# Patient Record
Sex: FEMALE | Race: WHITE | NOT HISPANIC OR LATINO | Employment: UNEMPLOYED | URBAN - METROPOLITAN AREA
[De-identification: names, ages, dates, MRNs, and addresses within clinical notes are randomized per-mention and may not be internally consistent; named-entity substitution may affect disease eponyms.]

---

## 2022-05-23 LAB
CHOLEST SERPL-MSCNC: 183 MG/DL (ref 0–200)
HDLC SERPL-MCNC: 77 MG/DL
LDL CHOLESTEROL DIRECT: 82 MG/DL
TRIGL SERPL-MCNC: 120 MG/DL

## 2023-02-01 ENCOUNTER — OFFICE VISIT (OUTPATIENT)
Dept: OBSTETRICS AND GYNECOLOGY | Facility: CLINIC | Age: 38
End: 2023-02-01
Payer: COMMERCIAL

## 2023-02-01 VITALS
WEIGHT: 151.88 LBS | SYSTOLIC BLOOD PRESSURE: 102 MMHG | DIASTOLIC BLOOD PRESSURE: 64 MMHG | HEIGHT: 67 IN | BODY MASS INDEX: 23.84 KG/M2

## 2023-02-01 DIAGNOSIS — N92.6 MISSED PERIOD: ICD-10-CM

## 2023-02-01 DIAGNOSIS — Z71.3 WEIGHT LOSS COUNSELING, ENCOUNTER FOR: ICD-10-CM

## 2023-02-01 DIAGNOSIS — Z00.00 ANNUAL PHYSICAL EXAM: ICD-10-CM

## 2023-02-01 DIAGNOSIS — Z11.3 SCREENING FOR STDS (SEXUALLY TRANSMITTED DISEASES): Primary | ICD-10-CM

## 2023-02-01 LAB
B-HCG UR QL: NEGATIVE
CTP QC/QA: YES

## 2023-02-01 PROCEDURE — 81025 POCT URINE PREGNANCY: ICD-10-PCS | Mod: S$GLB,,, | Performed by: OBSTETRICS & GYNECOLOGY

## 2023-02-01 PROCEDURE — 87591 N.GONORRHOEAE DNA AMP PROB: CPT

## 2023-02-01 PROCEDURE — 81025 URINE PREGNANCY TEST: CPT | Mod: S$GLB,,, | Performed by: OBSTETRICS & GYNECOLOGY

## 2023-02-01 PROCEDURE — 99999 PR PBB SHADOW E&M-NEW PATIENT-LVL III: ICD-10-PCS | Mod: PBBFAC,,,

## 2023-02-01 PROCEDURE — 99999 PR PBB SHADOW E&M-NEW PATIENT-LVL III: CPT | Mod: PBBFAC,,,

## 2023-02-01 PROCEDURE — 99202 PR OFFICE/OUTPT VISIT, NEW, LEVL II, 15-29 MIN: ICD-10-PCS | Mod: S$GLB,,, | Performed by: OBSTETRICS & GYNECOLOGY

## 2023-02-01 PROCEDURE — 99202 OFFICE O/P NEW SF 15 MIN: CPT | Mod: S$GLB,,, | Performed by: OBSTETRICS & GYNECOLOGY

## 2023-02-01 PROCEDURE — 87491 CHLMYD TRACH DNA AMP PROBE: CPT

## 2023-02-01 RX ORDER — FLUOXETINE HYDROCHLORIDE 20 MG/1
20 CAPSULE ORAL DAILY
COMMUNITY
End: 2023-02-09 | Stop reason: SDUPTHER

## 2023-02-01 RX ORDER — HYDROXYZINE HYDROCHLORIDE 10 MG/1
25 TABLET, FILM COATED ORAL 3 TIMES DAILY PRN
COMMUNITY
End: 2023-02-09

## 2023-02-01 NOTE — PROGRESS NOTES
"CC: STD check    HPI:  Frankel Elizabeth is a 37 y.o. female  presents to walk in clinic for STD check. Pt is sexually active with a new partner. Denies contraceptive use and condom use. States that she is 2 days late for her menstrual cycle. UPT in clinic was negative. Wants STD urine check for G/C. Declines other testing.    Past Medical History:   Diagnosis Date    Asthma      Past Surgical History:   Procedure Laterality Date    WRIST SURGERY      both wrist     Social History     Tobacco Use    Smoking status: Never    Smokeless tobacco: Never   Substance Use Topics    Alcohol use: Yes     Comment: socially    Drug use: Never     History reviewed. No pertinent family history.  OB History    Para Term  AB Living   1       1     SAB IAB Ectopic Multiple Live Births                  # Outcome Date GA Lbr Hilario/2nd Weight Sex Delivery Anes PTL Lv   1 AB                /64   Ht 5' 7" (1.702 m)   Wt 68.9 kg (151 lb 14.4 oz)   LMP 2023   BMI 23.79 kg/m²     ROS:  GENERAL: No fever, chills, fatigability or weight loss.  VULVAR: No pain, no lesions and no itching.  VAGINAL: No relaxation, no itching, no discharge, no abnormal bleeding and no lesions.  ABDOMEN: No abdominal pain. Denies nausea. Denies vomiting. No diarrhea. No constipation  BREAST: Denies pain. No lumps. No discharge.  URINARY: No incontinence, no nocturia, no frequency and no dysuria.  CARDIOVASCULAR: No chest pain. No shortness of breath. No leg cramps.  NEUROLOGICAL: No headaches. No vision changes.    PE:  AFFECT: Calm, alert and oriented X 3. Interactive during exam  GENERAL: Appears well-nourished, well-developed, in no acute distress.  HEAD: Normocephalic, atruamatic  SKIN: Normal for race, warm, & dry. No lesions or rashes.  LUNGS: Easy and unlabored  HEART: Regular rate   EXTREMITIES: No cyanosis, clubbing or edema. No calf tenderness.    ASSESSMENT and PLAN:    ICD-10-CM ICD-9-CM    1. Screening for STDs " (sexually transmitted diseases)  Z11.3 V74.5 C. trachomatis/N. gonorrhoeae by AMP DNA [facility]; Urine      2. Missed period  N92.6 626.4 POCT Urine Pregnancy        - G/C urine ordered.  - Discussed importance of condom use.  - Referral sent for PCP and appointment made with GYN 2/6/2023 for wwe.    FOLLOW UP: PRN lack of improvement.    Maryann MCPHERSON-S     I have seen the patient, reviewed the  PA students  assessment, plan, and progress note. I have personally interviewed and examined the patient at bedside and: agree with the findings.      Kimmie Tamayo, NP-C  OBGYN

## 2023-02-02 LAB
C TRACH DNA SPEC QL NAA+PROBE: NOT DETECTED
N GONORRHOEA DNA SPEC QL NAA+PROBE: NOT DETECTED

## 2023-02-06 ENCOUNTER — TELEPHONE (OUTPATIENT)
Dept: OBSTETRICS AND GYNECOLOGY | Facility: CLINIC | Age: 38
End: 2023-02-06
Payer: COMMERCIAL

## 2023-02-06 NOTE — TELEPHONE ENCOUNTER
Returned pts call. Pt stated that she stated her cycle and had questions/ concerns about still coming in today. All questions answered and pt wanted to r/s appt. Vu and appt r/s     ----- Message from Nicole Vazquez sent at 2/6/2023 11:19 AM CST -----  Regarding: missed call  Type:  Patient Returning Call    Who Called:Juany    Who Left Message for Patient:Negrita     Does the patient know what this is regarding?:yes     Would the patient rather a call back or a response via [facility]? Call     Best Call Back Number:[phone]  Additional Information:

## 2023-02-06 NOTE — TELEPHONE ENCOUNTER
Returned pts call. Pt did not answer, left vm for pt to give the office a call back     ----- Message from Kartik Alberto sent at 2/6/2023 10:58 AM CST -----  Regarding: Call BAck  Name of Who is Calling:ELIZABETH, FRANKEL [[id number]]              What is the request in detail: Patient has a appointment today 02- states her period is down but it's light should she still come in. Please assist              Can the clinic reply by [facility]: No              What Number to Call Back if not in [facility]: [phone]

## 2023-02-09 ENCOUNTER — OFFICE VISIT (OUTPATIENT)
Dept: PRIMARY CARE CLINIC | Facility: CLINIC | Age: 38
End: 2023-02-09
Attending: FAMILY MEDICINE
Payer: COMMERCIAL

## 2023-02-09 VITALS
BODY MASS INDEX: 23.63 KG/M2 | WEIGHT: 150.56 LBS | OXYGEN SATURATION: 99 % | HEART RATE: 74 BPM | HEIGHT: 67 IN | SYSTOLIC BLOOD PRESSURE: 102 MMHG | DIASTOLIC BLOOD PRESSURE: 68 MMHG

## 2023-02-09 DIAGNOSIS — K21.9 GASTROESOPHAGEAL REFLUX DISEASE WITHOUT ESOPHAGITIS: ICD-10-CM

## 2023-02-09 DIAGNOSIS — F41.1 GENERALIZED ANXIETY DISORDER: Primary | ICD-10-CM

## 2023-02-09 DIAGNOSIS — K44.9 HIATAL HERNIA: ICD-10-CM

## 2023-02-09 DIAGNOSIS — E53.8 B12 DEFICIENCY: ICD-10-CM

## 2023-02-09 DIAGNOSIS — Z71.3 WEIGHT LOSS COUNSELING, ENCOUNTER FOR: ICD-10-CM

## 2023-02-09 DIAGNOSIS — Z00.01 ENCOUNTER FOR GENERAL ADULT MEDICAL EXAMINATION WITH ABNORMAL FINDINGS: ICD-10-CM

## 2023-02-09 PROCEDURE — 99999 PR PBB SHADOW E&M-EST. PATIENT-LVL III: CPT | Mod: PBBFAC,,, | Performed by: FAMILY MEDICINE

## 2023-02-09 PROCEDURE — 99999 PR PBB SHADOW E&M-EST. PATIENT-LVL III: ICD-10-PCS | Mod: PBBFAC,,, | Performed by: FAMILY MEDICINE

## 2023-02-09 PROCEDURE — 99385 PREV VISIT NEW AGE 18-39: CPT | Mod: S$GLB,,, | Performed by: FAMILY MEDICINE

## 2023-02-09 PROCEDURE — 99385 PR PREVENTIVE VISIT,NEW,18-39: ICD-10-PCS | Mod: S$GLB,,, | Performed by: FAMILY MEDICINE

## 2023-02-09 RX ORDER — OMEPRAZOLE 20 MG/1
20 CAPSULE, DELAYED RELEASE ORAL DAILY
Qty: 90 CAPSULE | Refills: 3 | Status: SHIPPED | OUTPATIENT
Start: 2023-02-09 | End: 2023-07-31 | Stop reason: ALTCHOICE

## 2023-02-09 RX ORDER — HYDROXYZINE HYDROCHLORIDE 25 MG/1
25 TABLET, FILM COATED ORAL 3 TIMES DAILY PRN
Qty: 30 TABLET | Refills: 0 | Status: SHIPPED | OUTPATIENT
Start: 2023-02-09 | End: 2023-05-23 | Stop reason: SDUPTHER

## 2023-02-09 RX ORDER — PROPRANOLOL HYDROCHLORIDE 10 MG/1
10 TABLET ORAL 3 TIMES DAILY PRN
Qty: 30 TABLET | Refills: 5 | Status: SHIPPED | OUTPATIENT
Start: 2023-02-09

## 2023-02-09 RX ORDER — FLUOXETINE HYDROCHLORIDE 20 MG/1
20 CAPSULE ORAL DAILY
Qty: 90 CAPSULE | Refills: 3 | Status: SHIPPED | OUTPATIENT
Start: 2023-02-09 | End: 2023-11-29

## 2023-02-09 RX ORDER — OMEPRAZOLE 20 MG/1
20 CAPSULE, DELAYED RELEASE ORAL DAILY
COMMUNITY
End: 2023-02-09 | Stop reason: SDUPTHER

## 2023-02-09 NOTE — PROGRESS NOTES
FAMILY MEDICINE  [facility]    Reason for visit:   Chief Complaint   Patient presents with    Annual Exam         SUBJECTIVE: Elizabeth Frankel is a 37 y.o. female  - with anxiety, B12 deficiency, GERD and hiatal hernia presents as a new patient to establish care and refill medication. Last PCP [place]    Gynecology Kimmie Tamayo NP  - scheduled for PAP 2/13/23    She reports that she recently had a full panel of labs done by her PCP in [place] prior to moving to [facility]. Release signed at visit.     She does have a history of B12 deficiency.  She reports that she did have an endoscopy.  They did try oral B12 without any improvement levels.  She reports her endoscopy was negative.  She is on a PPI as well as B12 injections twice a month.     She is also interested in discussing weight loss.  She is gained about 18 lb to [place].  She is very active and also eats a very clean and healthy diet.  She reports it increases her anxiety.  Her BMI is 23.58.      1. Anxiety    Age diagnosed: teenager     Today 2/9/23:  She reports her anxiety has been fairly well controlled.  She does have a lot of increased stressors.  She reports her sister is ill.  She reports that she is been on anti anxiety medicine since her teenage years.  She has tried several medications in the past and has felt most stable on fluoxetine.  Two years ago her mother passed suddenly from anal cancer secondary to HPV.  It was during the-pandemic and she and her sister had to care for her mother.  She reports it was very traumatizing.  She used to be on benzodiazepines as needed for breakthrough anxiety can panic attacks.  She has been able to wean off of her benzodiazepine and currently takes hydroxyzine 25 mg as needed.  She reports that hydroxyzine often makes her feel groggy.  She has at times tried propanolol and feels that she tolerates that much better and does not give her an over sedating effect.  She is not tried  buspirone in the past.  She has had counseling in the past but does not have counselor currently    Prior notes: NA    Panic attacks: yes  Hopelessness:  denies  Sleep issues:  denies  Suicidal thoughts:  denies  Thoughts of self harm: denies  Thoughts of harm to others:  denies  History of suicide attempts:  denies  Family history of suicide: denies    Psychiatrist: none  Psychologist: none  Counselor : previous but none currently    Prior medication: sertraline, wellbutrin    Current medications:   FLUoxetine 20 MG capsule, Take 20 mg by mouth once daily., Disp: , Rfl:   hydrOXYzine HCL (ATARAX) 10 MG Tab, Take 25 mg by mouth 3 (three) times daily as needed., Disp: , Rfl:     Side effects of current treatment: weight    Support system: family and friends    2. GERD without esophagitis    Age diagnosed: 30's    Current treatment:   omeprazole (PRILOSEC) 20 MG capsule, Take 20 mg by mouth once daily., Disp: , Rfl:     Symptoms: reflux  Dietary management: High compliance  Are symptoms controlled? Yes  Prior EGD: Yes reports done for B12 deficiency and GERD + hiatal hernia and otherwise negative for Hpylori and malignancy.               Review of Systems   Constitutional:         +weight gain 18 lbs   All other systems reviewed and are negative.    HEALTH MAINTENANCE:   Health Maintenance   Topic Date Due    Hepatitis C Screening  Never done    Lipid Panel  Never done    TETANUS VACCINE  02/09/2031     Health Maintenance Topics with due status: Not Due       Topic Last Completion Date    TETANUS VACCINE 02/09/2021     Health Maintenance Due   Topic Date Due    Hepatitis C Screening  Never done    Cervical Cancer Screening  Never done    Lipid Panel  Never done    COVID-19 Vaccine (1) Never done    HIV Screening  Never done    Influenza Vaccine (1) Never done       HISTORY:   Past Medical History:   Diagnosis Date    Anxiety     Asthma     B12 deficiency anemia     Hiatal hernia        Past Surgical History:    Procedure Laterality Date    WRIST SURGERY Bilateral     ligament repair       Family History   Problem Relation Age of Onset    Cancer Mother         HPV anal cancer    Depression Mother     Macular degeneration Mother     No Known Problems Father     No Known Problems Sister     No Known Problems Sister     Lung cancer Maternal Grandmother     Breast cancer Maternal Grandmother     No Known Problems Maternal Grandfather     No Known Problems Paternal Grandmother     No Known Problems Paternal Grandfather        Social History     Tobacco Use    Smoking status: Never     Passive exposure: Never    Smokeless tobacco: Never   Substance Use Topics    Alcohol use: Yes     Comment: socially    Drug use: Never       Social History     Social History Narrative    Single. No children (). From [place] but she has moved around lot and most recently moved from [place], [place] to [facility] 10/2022. She lives alone. She works in sales and enjoys it. Her mother passed  from aggressive anal cancer during the peak of Covid-19 pandemic and it was very traumatic. She has 2 half sisters, 2 step sisters and 2 step brothers. 1 sister       ALLERGIES:   Review of patient's allergies indicates:  No Known Allergies    MEDS:     Current Outpatient Medications:     cyanocobalamin, vitamin B-12, 1,000 mcg/mL Kit, Inject 1,000 mcg as directed every 14 (fourteen) days., Disp: 6 kit, Rfl: 2    FLUoxetine 20 MG capsule, Take 1 capsule (20 mg total) by mouth once daily., Disp: 90 capsule, Rfl: 3    hydrOXYzine HCL (ATARAX) 25 MG tablet, Take 1 tablet (25 mg total) by mouth 3 (three) times daily as needed for Anxiety., Disp: 30 tablet, Rfl: 0    omeprazole (PRILOSEC) 20 MG capsule, Take 1 capsule (20 mg total) by mouth once daily., Disp: 90 capsule, Rfl: 3    propranoloL (INDERAL) 10 MG tablet, Take 1 tablet (10 mg total) by mouth 3 (three) times daily as needed (anxiety)., Disp: 30 tablet, Rfl: 5      Vital signs:   Vitals:    23  "1207   BP: 102/68   Pulse: 74   SpO2: 99%   Weight: 68.3 kg (150 lb 9.2 oz)   Height: 5' 7" (1.702 m)     Body mass index is 23.58 kg/m².    PHYSICAL EXAM:     Physical Exam  Vitals reviewed.   Constitutional:       General: She is not in acute distress.  HENT:      Head: Normocephalic and atraumatic.      Right Ear: Tympanic membrane and ear canal normal.      Left Ear: Tympanic membrane and ear canal normal.   Eyes:      General: No scleral icterus.     Conjunctiva/sclera: Conjunctivae normal.   Neck:      Thyroid: No thyromegaly.      Vascular: No carotid bruit.   Cardiovascular:      Rate and Rhythm: Normal rate and regular rhythm.      Pulses: Normal pulses.      Heart sounds: Normal heart sounds. No murmur heard.    No friction rub. No gallop.   Pulmonary:      Effort: Pulmonary effort is normal.      Breath sounds: Normal breath sounds. No wheezing, rhonchi or rales.   Abdominal:      General: Bowel sounds are normal. There is no distension.      Palpations: Abdomen is soft.      Tenderness: There is no abdominal tenderness.   Musculoskeletal:      Cervical back: Normal range of motion and neck supple.      Right lower leg: No edema.      Left lower leg: No edema.   Lymphadenopathy:      Cervical: No cervical adenopathy.   Skin:     General: Skin is warm.      Capillary Refill: Capillary refill takes less than 2 seconds.   Neurological:      Mental Status: She is alert.           PERTINENT RESULTS:   No visits with results within 1 Week(s) from this visit.   Latest known visit with results is:   Office Visit on 02/01/2023   Component Date Value Ref Range Status    Chlamydia, Amplified DNA 02/01/2023 Not Detected  Not Detected Final    N gonorrhoeae, amplified DNA 02/01/2023 Not Detected  Not Detected Final    POC Preg Test, Ur 02/01/2023 Negative  Negative Final     Acceptable 02/01/2023 Yes   Final     Recent labs done by last PCP. Request a copy    ASSESSMENT/PLAN:    1. Encounter for general " adult medical examination with abnormal findings  Overview:  - preventative health counseling  - counseling on current recommendations for breast cancer screening. Grandmother with breast cancer  - counseling on current recommendations for cervical cancer screening. Scheduled 2/13/23 with Gynecology  - counseling on current recommendations for colon cancer screening. Average risk  - Vaccines recommended at this visit: see below        2. Generalized anxiety disorder  Overview:  - stable  - continue current medications  - trial propranolol 10 mg PRN    Orders:  -     propranoloL (INDERAL) 10 MG tablet; Take 1 tablet (10 mg total) by mouth 3 (three) times daily as needed (anxiety).  Dispense: 30 tablet; Refill: 5  -     FLUoxetine 20 MG capsule; Take 1 capsule (20 mg total) by mouth once daily.  Dispense: 90 capsule; Refill: 3  -     hydrOXYzine HCL (ATARAX) 25 MG tablet; Take 1 tablet (25 mg total) by mouth 3 (three) times daily as needed for Anxiety.  Dispense: 30 tablet; Refill: 0    3. Gastroesophageal reflux disease without esophagitis  Overview:  - history of EGD  - well controlled  - continue current management plan   - patient encouraged to notify me with any changes    Orders:  -     omeprazole (PRILOSEC) 20 MG capsule; Take 1 capsule (20 mg total) by mouth once daily.  Dispense: 90 capsule; Refill: 3    4. Hiatal hernia  Overview:  - noted on previous EGD      5. B12 deficiency  Overview:  - evaluated for atrophic gastritis with EGD and negative  - no improvement with oral B12  - currently on SC B12 1000 mcg twice a month    Orders:  -     cyanocobalamin, vitamin B-12, 1,000 mcg/mL Kit; Inject 1,000 mcg as directed every 14 (fourteen) days.  Dispense: 6 kit; Refill: 2    6. Weight loss counseling, encounter for  Overview:  - discussed that currently medications not recommended for her BMI of 23.5  - she is active and exercising well.  She has a healthy diet.    Orders:  -     Ambulatory referral/consult to  Internal Medicine          ORDERS:   Orders Placed This Encounter    propranoloL (INDERAL) 10 MG tablet    FLUoxetine 20 MG capsule    omeprazole (PRILOSEC) 20 MG capsule    hydrOXYzine HCL (ATARAX) 25 MG tablet    cyanocobalamin, vitamin B-12, 1,000 mcg/mL Kit       Vaccines recommended: she reports up to date with flu, covid-19 and Tdap    Follow-up in 1 year pending results or sooner if any concerns.      This note is dictated using the M*Modal Fluency Direct word recognition program. There are word recognition mistakes that are occasionally missed on review.    Dr. Lucila Krause D.O.   Family Medicine

## 2023-02-14 ENCOUNTER — PATIENT OUTREACH (OUTPATIENT)
Dept: ADMINISTRATIVE | Facility: HOSPITAL | Age: 38
End: 2023-02-14
Payer: COMMERCIAL

## 2023-03-29 ENCOUNTER — TELEPHONE (OUTPATIENT)
Dept: PRIMARY CARE CLINIC | Facility: CLINIC | Age: 38
End: 2023-03-29
Payer: COMMERCIAL

## 2023-03-29 ENCOUNTER — E-VISIT (OUTPATIENT)
Dept: PRIMARY CARE CLINIC | Facility: CLINIC | Age: 38
End: 2023-03-29
Attending: FAMILY MEDICINE
Payer: COMMERCIAL

## 2023-03-29 DIAGNOSIS — R39.11 URINARY HESITANCY: Primary | ICD-10-CM

## 2023-03-29 DIAGNOSIS — Z20.2 STD EXPOSURE: Primary | ICD-10-CM

## 2023-03-29 DIAGNOSIS — N30.01 ACUTE CYSTITIS WITH HEMATURIA: ICD-10-CM

## 2023-03-29 DIAGNOSIS — Z11.3 SCREEN FOR STD (SEXUALLY TRANSMITTED DISEASE): ICD-10-CM

## 2023-03-29 PROCEDURE — 99421 PR E&M, ONLINE DIGIT, EST, < 7 DAYS, 5-10 MINS: ICD-10-PCS | Mod: 95,,, | Performed by: FAMILY MEDICINE

## 2023-03-29 PROCEDURE — 99421 OL DIG E/M SVC 5-10 MIN: CPT | Mod: 95,,, | Performed by: FAMILY MEDICINE

## 2023-03-29 RX ORDER — CIPROFLOXACIN 500 MG/1
500 TABLET ORAL EVERY 12 HOURS
Qty: 10 TABLET | Refills: 0 | Status: SHIPPED | OUTPATIENT
Start: 2023-03-29 | End: 2023-04-03

## 2023-03-29 NOTE — PROGRESS NOTES
Patient ID: Elizabeth Frankel is a 37 y.o. female.    Chief Complaint: Urinary Tract Infection    The patient initiated a request through [employer] on 3/29/2023 for evaluation and management with a chief complaint of Urinary Tract Infection     I evaluated the questionnaire submission on 3/29/23. Also called and spoke with patient.  She denies urinary hesitancy reports it is more of an urgency.  She also denies severe pelvic pain.  She reports she is discomfort in her suprapubic area.  She denies any fevers or chills.  She denies any flank pain.  She also has concerns for STD and would like testing for gonorrhea, chlamydia and Trichomonas.  She denies any vaginal discharge    [facility] Peq Evisit Uti Questionnaire    3/29/2023  3:14 PM CDT - Filed by Patient   Do you agree to participate in an E-Visit? Yes   If you have any of the following problems, please present to your local ER or call 911:  I acknowledge   What is the main issue that you would like for your doctor to address today? Abdominal pain  and difficulty urinating   Are you able to take your vital signs? No   Are you currently pregnant, could you be pregnant, or are you breast feeding? None of the above   What symptoms do you currently have? Difficulty passing urine   When did your symptoms first appear? 3/26/2023   List what you have done or taken to help your symptoms. NA   Please indicate whether you have had the following symptoms during the past 24 hours     Urgent urination (a sudden and uncontrollable urge to urinate) Mild   Frequent urination of small amounts of urine (going to the toilet very often) Moderate   Burning pain when urinating None   Incomplete bladder emptying (still feel like you need to urinate again after urination) Mild   Pain not associated with urination in the lower abdomen below the belly button) Severe   What does your urine look like? I am not sure   Blood seen in the urine None   Flank pain (pain in one or both sides of the lower  back) Mild   Abnormal Vaginal Discharge (abnormal amount, color and/or odor) Mild   Discharge from the urethra (urinary opening) without urination None   High body temperature/fever? None-<99.5   Please rate how much discomfort you have experience because of the symptoms in the past 24 hours: Moderate   Please indicate how the symptoms have interfered with your every day activities/work in the past 24 hours: Mild   Please indicate how these symptoms have interfered with your social activities (visiting people, meeting with friends, etc.) in the past 24 hours? Mild   Are you a diabetic? No   Please indicate whether you have the following at the time of completion of this questionnaire: None of the above   Provide any information you feel is important to your history not asked above    Please attach any relevant images or files (if you have performed a home test for UTI, please submit a photo of results)           Patient Active Problem List   Diagnosis    B12 deficiency    Encounter for general adult medical examination with abnormal findings    Generalized anxiety disorder    Gastroesophageal reflux disease without esophagitis    Hiatal hernia    Weight loss counseling, encounter for       Vital signs: see above     Recent Labs Obtained:  Pending collection    1. Urinary hesitancy  -     Urinalysis, Reflex to Urine Culture Urine, Clean Catch; Future; Expected date: 03/29/2023    2. Acute cystitis with hematuria  -     ciprofloxacin HCl (CIPRO) 500 MG tablet; Take 1 tablet (500 mg total) by mouth every 12 (twelve) hours. for 5 days  Dispense: 10 tablet; Refill: 0        E-Visit Time Tracking:    Day 1 Time (in minutes): 10     Total Time (in minutes): 10      Dr. Lucila Krause D.O.   Family Medicine

## 2023-03-29 NOTE — TELEPHONE ENCOUNTER
----- Message from [place] sent at 3/29/2023 10:09 AM CDT -----  Regarding: Sooner Appt Request  Who Is Calling : FRANKEL, ELIZABETH [[id number]]        Reason For The Call: Patient is requesting a sooner appointment.  Patient declined first available and does not want to be added to the waitlist.  Please contact the patient to schedule.        Preferred Contact Method: [phone]        Additional Information: Patient believes she has an UTI. First available was 5/24

## 2023-03-29 NOTE — TELEPHONE ENCOUNTER
Pt c/o UTI. She states that she has frequent urination. Sent E-visit.     Pt is also requesting tachomatis and gonorrhoeae testing. Pt declined the full STD panel and states that she only wants to the urine tested for and STD.

## 2023-03-29 NOTE — TELEPHONE ENCOUNTER
Orders Placed This Encounter    C. trachomatis/N. gonorrhoeae by AMP DNA [facility]; Urine    Trichomonas vaginalis, RNA, Qual, Urine     Dr. Lucila Krause D.O.   [facility]

## 2023-03-30 ENCOUNTER — LAB VISIT (OUTPATIENT)
Dept: LAB | Facility: HOSPITAL | Age: 38
End: 2023-03-30
Attending: FAMILY MEDICINE
Payer: COMMERCIAL

## 2023-03-30 DIAGNOSIS — Z11.3 SCREEN FOR STD (SEXUALLY TRANSMITTED DISEASE): ICD-10-CM

## 2023-03-30 DIAGNOSIS — R39.11 URINARY HESITANCY: ICD-10-CM

## 2023-03-30 LAB
BACTERIA #/AREA URNS AUTO: ABNORMAL /HPF
BILIRUB UR QL STRIP: NEGATIVE
CLARITY UR REFRACT.AUTO: ABNORMAL
COLOR UR AUTO: YELLOW
GLUCOSE UR QL STRIP: NEGATIVE
HGB UR QL STRIP: NEGATIVE
KETONES UR QL STRIP: NEGATIVE
LEUKOCYTE ESTERASE UR QL STRIP: ABNORMAL
MICROSCOPIC COMMENT: ABNORMAL
NITRITE UR QL STRIP: NEGATIVE
PH UR STRIP: 6 [PH] (ref 5–8)
PROT UR QL STRIP: ABNORMAL
RBC #/AREA URNS AUTO: 17 /HPF (ref 0–4)
SP GR UR STRIP: 1.01 (ref 1–1.03)
SQUAMOUS #/AREA URNS AUTO: >100 /HPF
URN SPEC COLLECT METH UR: ABNORMAL
WBC #/AREA URNS AUTO: 28 /HPF (ref 0–5)

## 2023-03-30 PROCEDURE — 87086 URINE CULTURE/COLONY COUNT: CPT | Performed by: FAMILY MEDICINE

## 2023-03-30 PROCEDURE — 87661 TRICHOMONAS VAGINALIS AMPLIF: CPT | Performed by: FAMILY MEDICINE

## 2023-03-30 PROCEDURE — 81001 URINALYSIS AUTO W/SCOPE: CPT | Performed by: FAMILY MEDICINE

## 2023-03-31 ENCOUNTER — PATIENT MESSAGE (OUTPATIENT)
Dept: PRIMARY CARE CLINIC | Facility: CLINIC | Age: 38
End: 2023-03-31
Payer: COMMERCIAL

## 2023-04-01 LAB — BACTERIA UR CULT: NORMAL

## 2023-04-03 ENCOUNTER — PATIENT MESSAGE (OUTPATIENT)
Dept: PRIMARY CARE CLINIC | Facility: CLINIC | Age: 38
End: 2023-04-03
Payer: COMMERCIAL

## 2023-04-03 LAB
T VAGINALIS RRNA SPEC QL NAA+PROBE: NOT DETECTED
TRICHOMONAS VAGINALIS RNA, QUAL, SOURCE: NORMAL

## 2023-04-25 ENCOUNTER — E-VISIT (OUTPATIENT)
Dept: PRIMARY CARE CLINIC | Facility: CLINIC | Age: 38
End: 2023-04-25
Attending: FAMILY MEDICINE
Payer: COMMERCIAL

## 2023-04-25 ENCOUNTER — TELEPHONE (OUTPATIENT)
Dept: PRIMARY CARE CLINIC | Facility: CLINIC | Age: 38
End: 2023-04-25
Payer: COMMERCIAL

## 2023-04-25 DIAGNOSIS — N30.00 ACUTE CYSTITIS WITHOUT HEMATURIA: Primary | ICD-10-CM

## 2023-04-25 PROCEDURE — 99422 PR E&M, ONLINE DIGIT, EST, < 7 DAYS,  11-20 MINS: ICD-10-PCS | Mod: ,,, | Performed by: FAMILY MEDICINE

## 2023-04-25 PROCEDURE — 99422 OL DIG E/M SVC 11-20 MIN: CPT | Mod: ,,, | Performed by: FAMILY MEDICINE

## 2023-04-25 RX ORDER — CEPHALEXIN 500 MG/1
500 CAPSULE ORAL EVERY 12 HOURS
Qty: 10 CAPSULE | Refills: 0 | Status: SHIPPED | OUTPATIENT
Start: 2023-04-25 | End: 2023-04-30

## 2023-04-25 NOTE — PROGRESS NOTES
Patient ID: Elizabeth Frankel is a 38 y.o. female.    Chief Complaint: Urinary Tract Infection    The patient initiated a request through [employer] on 4/25/2023 for evaluation and management with a chief complaint of Urinary Tract Infection  See [employer] message    I evaluated the questionnaire submission on 6:09 PM 04/25/2023.    [facility] P[facility] Evisit Uti Questionnaire    4/25/2023  5:53 PM CDT - Filed by Patient   Do you agree to participate in an E-Visit? Yes   If you have any of the following problems, please present to your local ER or call 911:  I acknowledge   What is the main issue that you would like for your doctor to address today? Urinary issues   Are you able to take your vital signs? No   Are you currently pregnant, could you be pregnant, or are you breast feeding? None of the above   What symptoms do you currently have? Change in urine appearance or smell   When did your symptoms first appear? 4/22/2023   List what you have done or taken to help your symptoms. Extra water intake   Please indicate whether you have had the following symptoms during the past 24 hours     Urgent urination (a sudden and uncontrollable urge to urinate) Moderate   Frequent urination of small amounts of urine (going to the toilet very often) Mild   Burning pain when urinating Mild   Incomplete bladder emptying (still feel like you need to urinate again after urination) Moderate   Pain not associated with urination in the lower abdomen below the belly button) Moderate   What does your urine look like? Clear   Blood seen in the urine None   Flank pain (pain in one or both sides of the lower back) Mild   Abnormal Vaginal Discharge (abnormal amount, color and/or odor) Moderate   Discharge from the urethra (urinary opening) without urination None   High body temperature/fever? None-<99.5   Please rate how much discomfort you have experience because of the symptoms in the past 24 hours: Mild   Please indicate how the symptoms have interfered  with your every day activities/work in the past 24 hours: Mild   Please indicate how these symptoms have interfered with your social activities (visiting people, meeting with friends, etc.) in the past 24 hours? Mild   Are you a diabetic? No   Please indicate whether you have the following at the time of completion of this questionnaire: Premenstrual syndrome (PMS)   Provide any information you feel is important to your history not asked above    Please attach any relevant images or files (if you have performed a home test for UTI, please submit a photo of results)           Patient Active Problem List   Diagnosis    B12 deficiency    Encounter for general adult medical examination with abnormal findings    Generalized anxiety disorder    Gastroesophageal reflux disease without esophagitis    Hiatal hernia    Weight loss counseling, encounter for       Vital signs: see abve    Recent Labs Obtained:  Lab Visit on 03/30/2023   Component Date Value Ref Range Status    Chlamydia, Amplified DNA 03/30/2023 Not Detected  Not Detected Final    N gonorrhoeae, amplified DNA 03/30/2023 Not Detected  Not Detected Final   Lab Visit on 03/30/2023   Component Date Value Ref Range Status    Trichomonas vaginalis RNA, Qual, s* 03/30/2023 See Below   Final    RESULT: Urine - First Catch    Trichomonas vaginalis, QL, TMA 03/30/2023 Not detected   Final    Comment: This test utilizes Transcription Mediated Amplification  (TMA) and Hybridization Protection Assay to amplify and  detect specific ribosomal RNA sequences in Trichomonas   vaginalis strains.  The analytical sensitivity of this   assay is 0.1 T. vaginalis organisms per milliliter.  A   Not detected result does not rule out infection.     Therapeutic success or failure cannot be determined by   this assay because nucleic acids may persist following   appropriate antimicrobial therapy. The performance of   this test has not been evaluated in women <14 years of age.    Test  performed at [facility],  [address], [place]  [address]     [phone]  Diane Shi MD, PhD - Medical Director      Specimen UA 03/30/2023 Urine, Clean Catch   Final    Color, UA 03/30/2023 Yellow  Yellow, Straw, Angelina Final    Appearance, UA 03/30/2023 Hazy (A)  Clear Final    pH, UA 03/30/2023 6.0  5.0 - 8.0 Final    Specific Gravity, UA 03/30/2023 1.010  1.005 - 1.030 Final    Protein, UA 03/30/2023 Trace (A)  Negative Final    Comment: Recommend a 24 hour urine protein or a urine   protein/creatinine ratio if globulin induced proteinuria is  clinically suspected.      Glucose, UA 03/30/2023 Negative  Negative Final    Ketones, UA 03/30/2023 Negative  Negative Final    Bilirubin (UA) 03/30/2023 Negative  Negative Final    Occult Blood UA 03/30/2023 Negative  Negative Final    Nitrite, UA 03/30/2023 Negative  Negative Final    Leukocytes, UA 03/30/2023 1+ (A)  Negative Final    RBC, UA 03/30/2023 17 (H)  0 - 4 /hpf Final    WBC, UA 03/30/2023 28 (H)  0 - 5 /hpf Final    Bacteria 03/30/2023 Many (A)  None-Occ /hpf Final    Squam Epithel, UA 03/30/2023 >100  /hpf Final    Microscopic Comment 03/30/2023 SEE COMMENT   Final    Comment: Other formed elements not mentioned in the report are not   present in the microscopic examination.       Urine Culture, Routine 03/30/2023 No significant growth   Final         1. Acute cystitis without hematuria  -     cephALEXin (KEFLEX) 500 MG capsule; Take 1 capsule (500 mg total) by mouth every 12 (twelve) hours. for 5 days  Dispense: 10 capsule; Refill: 0  -     Urinalysis, Reflex to Urine Culture Urine, Clean Catch; Future; Expected date: 04/25/2023        E-Visit Time Tracking:    Day 1 Time (in minutes): 11     Total Time (in minutes): 11      Dr. Lucila Krause D.O.   [facility]

## 2023-04-25 NOTE — TELEPHONE ENCOUNTER
----- Message from Higinio Peng sent at 4/25/2023 11:44 AM CDT -----  Regarding: Call Back  Name of Who is Calling:  Patient          What is the request in detail:  Patient stated she was seen for a UTI and have taken all of the Rx and the symptoms has come back.            Can the clinic reply by [facility]: Yes            What Number to Call Back if not in [facility]:[phone]

## 2023-05-04 ENCOUNTER — OFFICE VISIT (OUTPATIENT)
Dept: INTERNAL MEDICINE | Facility: CLINIC | Age: 38
End: 2023-05-04
Attending: FAMILY MEDICINE
Payer: COMMERCIAL

## 2023-05-04 VITALS
HEIGHT: 67 IN | WEIGHT: 137.38 LBS | BODY MASS INDEX: 21.56 KG/M2 | DIASTOLIC BLOOD PRESSURE: 72 MMHG | HEART RATE: 83 BPM | SYSTOLIC BLOOD PRESSURE: 98 MMHG | OXYGEN SATURATION: 99 %

## 2023-05-04 DIAGNOSIS — R13.19 ESOPHAGEAL DYSPHAGIA: Primary | ICD-10-CM

## 2023-05-04 DIAGNOSIS — K21.00 GASTROESOPHAGEAL REFLUX DISEASE WITH ESOPHAGITIS WITHOUT HEMORRHAGE: ICD-10-CM

## 2023-05-04 PROCEDURE — 99214 PR OFFICE/OUTPT VISIT, EST, LEVL IV, 30-39 MIN: ICD-10-PCS | Mod: S$GLB,,, | Performed by: FAMILY MEDICINE

## 2023-05-04 PROCEDURE — 99999 PR PBB SHADOW E&M-EST. PATIENT-LVL IV: CPT | Mod: PBBFAC,,, | Performed by: FAMILY MEDICINE

## 2023-05-04 PROCEDURE — 99999 PR PBB SHADOW E&M-EST. PATIENT-LVL IV: ICD-10-PCS | Mod: PBBFAC,,, | Performed by: FAMILY MEDICINE

## 2023-05-04 PROCEDURE — 99214 OFFICE O/P EST MOD 30 MIN: CPT | Mod: S$GLB,,, | Performed by: FAMILY MEDICINE

## 2023-05-04 NOTE — PROGRESS NOTES
[facility]  Progress Note    MRN: [id number]   Patient Name: Elizabeth Frankel   Primary Care Physician: Balbir Bravo     Subjective:   Chief Complaint: dysphagia     History of Presenting Illness: Patient is a 38 y.o. female with history of hiatal hernia dx 10 years ago who presents today for dysphagia of 2 weeks duration. Ten years ago patient fainted 2x and associated blood work revealed B12 deficiency. At that time patient underwent endoscopy which revealed a hiatal hernia, but no other gastric abnormalities.   Patient reports intermittent dysphagia for solids, and recently for liquids as well. She has been unable to have regular fluid/food intake over the past 2 weeks and feels lightheaded/dizzy.  Hiatal hernia has not been repaired.  Patient has a history of depression currently well controlled with fluoxetine 20mg and hydroxizine for sleep.    Patient denies fever/chills, nausea/vomiting, headache, falls, seizures, changes in vision, new weakness/numbness/tingling    Past Medical History:   Diagnosis Date    Anxiety     Asthma     B12 deficiency anemia     Hiatal hernia      Past Surgical History:   Procedure Laterality Date    WRIST SURGERY Bilateral     ligament repair     Current Outpatient Medications on File Prior to Visit   Medication Sig Dispense Refill    cyanocobalamin, vitamin B-12, 1,000 mcg/mL Kit Inject 1,000 mcg as directed every 14 (fourteen) days. 6 kit 2    FLUoxetine 20 MG capsule Take 1 capsule (20 mg total) by mouth once daily. 90 capsule 3    hydrOXYzine HCL (ATARAX) 25 MG tablet Take 1 tablet (25 mg total) by mouth 3 (three) times daily as needed for Anxiety. 30 tablet 0    omeprazole (PRILOSEC) 20 MG capsule Take 1 capsule (20 mg total) by mouth once daily. 90 capsule 3    propranoloL (INDERAL) 10 MG tablet Take 1 tablet (10 mg total) by mouth 3 (three) times daily as needed (anxiety). (Patient not taking: Reported on 5/4/2023) 30 tablet 5     No current  "facility-administered medications on file prior to visit.     Review of patient's allergies indicates:  No Known Allergies    Review of Systems   Constitutional:  Positive for malaise/fatigue and weight loss. Negative for chills and fever.   HENT:  Negative for nosebleeds.    Eyes:  Negative for double vision, pain and redness.   Respiratory:  Positive for shortness of breath. Negative for cough, hemoptysis and wheezing.    Cardiovascular:  Negative for chest pain and palpitations.   Gastrointestinal:  Negative for abdominal pain, blood in stool, nausea and vomiting.   Genitourinary:  Negative for hematuria.   Musculoskeletal: Negative.    Skin:  Negative for rash.   Neurological:  Positive for dizziness.   Endo/Heme/Allergies: Negative.    Psychiatric/Behavioral:  Positive for depression.      Objective:   BP 98/72 (BP Location: Left arm, Patient Position: Sitting)   Pulse 83   Ht 5' 7" (1.702 m)   Wt 62.3 kg (137 lb 5.6 oz)   SpO2 99%   BMI 21.51 kg/m²     Physical Exam:  General: ill appearing, mildly dehydrated, dry mucous membranes, Not in distress  Head: Non-traumatic, normocephalic  Neck: Supple, no tenderness to palpation, no lymphadenopathy  CVS: Dual heart sounds, no added heart sounds, no murmur, regular rate and rhythm, distal pulses equal and weak bilaterally  Pulm: Symmetric expansion, lungs clear to auscultation bilaterally, no wheeze, crackles, rales  GI: Abdomen soft, non-distended, nontender  MSK: Moves all extremities without restriction, atraumatic  Skin: Dry, intact  Psych: Normal thought content and cognition  Neuro: AOx3; CNII-XII: Intact grossly, visual fields grossly intact, EOMi  Normal strength BUE/BLE  Normal gait    No results for input(s): CBC in the last 72 hours.   No images are attached to the encounter.    Assessment and Plan:   Patient is a 38 y.o. female with history of hiatal hernia dx 10 years ago who presents today for dysphagia of 2 weeks duration for solids and " increasingly liquids. Patient appears slightly dehydrated, with dry mucous membranes and general malaise.     Ambulatory referral to endoscopy  ASAP  3.   Follow up with us after EGD, we will order blood work during f/u annual      Scribed by  Yamileth Edgar Student

## 2023-05-05 ENCOUNTER — TELEPHONE (OUTPATIENT)
Dept: INTERNAL MEDICINE | Facility: CLINIC | Age: 38
End: 2023-05-05
Payer: COMMERCIAL

## 2023-05-05 ENCOUNTER — NURSE TRIAGE (OUTPATIENT)
Dept: ADMINISTRATIVE | Facility: CLINIC | Age: 38
End: 2023-05-05
Payer: COMMERCIAL

## 2023-05-05 ENCOUNTER — PATIENT MESSAGE (OUTPATIENT)
Dept: INTERNAL MEDICINE | Facility: CLINIC | Age: 38
End: 2023-05-05
Payer: COMMERCIAL

## 2023-05-05 NOTE — TELEPHONE ENCOUNTER
----- Message from Lyndsey John sent at 5/4/2023  3:34 PM CDT -----  Name of Who is Calling:  FRANKEL, ELIZABETH [[id number]]            What is the request in detail:Requesting a call back regarding a referral              Can the clinic reply by [facility]:no              What [facility] to [facility] Back [facility] not [facility]:[phone]

## 2023-05-05 NOTE — TELEPHONE ENCOUNTER
----- Message from Paulo Obrien sent at 5/5/2023  9:14 AM CDT -----  Name of Who is Calling: FRANKEL, ELIZABETH [[id number]]         What is the request in detail: PT stated that sh would like to speak directly with the office in regards to her questions/concerns.Please contact to further discuss and advise.            Can the clinic reply by [facility]: NO           What [facility] Back [facility]: [phone]

## 2023-05-05 NOTE — TELEPHONE ENCOUNTER
Pt stated she did not need anything further. Stated her referral was in her hand and she was trying to schedule with [facility].

## 2023-05-05 NOTE — TELEPHONE ENCOUNTER
Pt reports she was recently evaluated with PCP but forgot to mention she has been having black stools. Reports +pain with swallowing and feeling lightheaded. Reports she is acting confused and feeling disoriented. Advised, per protocol. She verbalizes understanding but would like follow up in this regard.     Reason for Disposition   Difficult to awaken or acting confused (e.g., disoriented, slurred speech)    Protocols used: Rectal Bleeding-A-AH

## 2023-05-15 PROBLEM — Z00.01 ENCOUNTER FOR GENERAL ADULT MEDICAL EXAMINATION WITH ABNORMAL FINDINGS: Status: RESOLVED | Noted: 2023-02-09 | Resolved: 2023-05-15

## 2023-05-22 ENCOUNTER — PATIENT OUTREACH (OUTPATIENT)
Dept: ADMINISTRATIVE | Facility: HOSPITAL | Age: 38
End: 2023-05-22
Payer: COMMERCIAL

## 2023-05-23 DIAGNOSIS — F41.1 GENERALIZED ANXIETY DISORDER: ICD-10-CM

## 2023-05-23 NOTE — TELEPHONE ENCOUNTER
Refill Encounter    PCP Visits: Recent Visits  Date Type Provider Dept   05/04/23 Office Visit Balbir Bravo MD [facility]   02/09/23 Office Visit Lucila Krause DO [facility]   Showing recent visits within past 360 days and meeting all other requirements  Future Appointments  No visits were found meeting these conditions.  Showing future appointments within next 720 days and meeting all other requirements     Last 3 Blood Pressure:   BP Readings from Last 3 Encounters:   05/04/23 98/72   02/09/23 102/68   02/01/23 102/64     Preferred Pharmacy:   [facility][id number] - [place] [id number] [address] [address]  Phone: [phone] Fax: [phone]    Requested RX:  Requested Prescriptions     Pending Prescriptions Disp Refills    hydrOXYzine HCL (ATARAX) 25 MG tablet 30 tablet 0     Sig: Take 1 tablet (25 mg total) by mouth 3 (three) times daily as needed for Anxiety.      RX Route: Normal

## 2023-05-24 RX ORDER — HYDROXYZINE HYDROCHLORIDE 25 MG/1
25 TABLET, FILM COATED ORAL 3 TIMES DAILY PRN
Qty: 30 TABLET | Refills: 0 | Status: SHIPPED | OUTPATIENT
Start: 2023-05-24 | End: 2023-06-09 | Stop reason: SDUPTHER

## 2023-05-30 ENCOUNTER — OFFICE VISIT (OUTPATIENT)
Dept: URGENT CARE | Facility: CLINIC | Age: 38
End: 2023-05-30
Payer: COMMERCIAL

## 2023-05-30 VITALS
HEIGHT: 67 IN | OXYGEN SATURATION: 98 % | RESPIRATION RATE: 19 BRPM | WEIGHT: 137 LBS | BODY MASS INDEX: 21.5 KG/M2 | HEART RATE: 85 BPM | SYSTOLIC BLOOD PRESSURE: 105 MMHG | DIASTOLIC BLOOD PRESSURE: 75 MMHG | TEMPERATURE: 99 F

## 2023-05-30 DIAGNOSIS — J02.0 STREP PHARYNGITIS: Primary | ICD-10-CM

## 2023-05-30 DIAGNOSIS — J02.9 SORE THROAT: ICD-10-CM

## 2023-05-30 LAB
CTP QC/QA: YES
CTP QC/QA: YES
MOLECULAR STREP A: POSITIVE
SARS-COV-2 AG RESP QL IA.RAPID: NEGATIVE

## 2023-05-30 PROCEDURE — 99203 PR OFFICE/OUTPT VISIT, NEW, LEVL III, 30-44 MIN: ICD-10-PCS | Mod: S$GLB,,, | Performed by: NURSE PRACTITIONER

## 2023-05-30 PROCEDURE — 87651 STREP A DNA AMP PROBE: CPT | Mod: QW,S$GLB,, | Performed by: NURSE PRACTITIONER

## 2023-05-30 PROCEDURE — 87811 SARS-COV-2 COVID19 W/OPTIC: CPT | Mod: QW,S$GLB,, | Performed by: NURSE PRACTITIONER

## 2023-05-30 PROCEDURE — 87651 POCT STREP A MOLECULAR: ICD-10-PCS | Mod: QW,S$GLB,, | Performed by: NURSE PRACTITIONER

## 2023-05-30 PROCEDURE — 99203 OFFICE O/P NEW LOW 30 MIN: CPT | Mod: S$GLB,,, | Performed by: NURSE PRACTITIONER

## 2023-05-30 PROCEDURE — 87811 SARS CORONAVIRUS 2 ANTIGEN POCT, MANUAL READ: ICD-10-PCS | Mod: QW,S$GLB,, | Performed by: NURSE PRACTITIONER

## 2023-05-30 RX ORDER — ONDANSETRON 4 MG/1
4 TABLET, ORALLY DISINTEGRATING ORAL 2 TIMES DAILY
Qty: 20 TABLET | Refills: 0 | Status: SHIPPED | OUTPATIENT
Start: 2023-05-30 | End: 2023-06-09

## 2023-05-30 RX ORDER — FLUTICASONE PROPIONATE 50 MCG
2 SPRAY, SUSPENSION (ML) NASAL DAILY
Qty: 11.1 ML | Refills: 0 | Status: SHIPPED | OUTPATIENT
Start: 2023-05-30 | End: 2023-06-29

## 2023-05-30 RX ORDER — PANTOPRAZOLE SODIUM 40 MG
40 TABLET, DELAYED RELEASE (ENTERIC COATED) ORAL EVERY MORNING
COMMUNITY
Start: 2023-05-17

## 2023-05-30 RX ORDER — CETIRIZINE HYDROCHLORIDE 10 MG/1
10 TABLET ORAL DAILY
Qty: 30 TABLET | Refills: 0 | Status: SHIPPED | OUTPATIENT
Start: 2023-05-30 | End: 2023-06-29

## 2023-05-30 RX ORDER — AMOXICILLIN 500 MG/1
500 TABLET, FILM COATED ORAL EVERY 12 HOURS
Qty: 20 TABLET | Refills: 0 | Status: SHIPPED | OUTPATIENT
Start: 2023-05-30 | End: 2023-06-09

## 2023-05-30 NOTE — PROGRESS NOTES
"Subjective:      Patient ID: Elizabeth Frankel is a 38 y.o. female.    Vitals:  height is 5' 7" (1.702 m) and weight is 62.1 kg (137 lb). Her temperature is 98.7 °F (37.1 °C). Her blood pressure is 105/75 and her pulse is 85. Her respiration is 19 and oxygen saturation is 98%.     Chief Complaint: Sore Throat    39yo female pt reports large amount of postnasal drip, sinus congestion, and sore throat, all worsening over the past 3 days.  Reports no improvement with DayQuil, NyQuil, ibuprofen, or Tylenol.  Reports Tmax 100.0F at home, resolved with ibuprofen, denies chills or sweating.  Denies n/v/d, denies abd pain.  Denies chest pain, wheezing, or SOB, reports some mild tightness to center of chest, which she states is typical for her when she gets a URI/sore throat.  Reports pain with swallowing, denies inability to swallow liquids or solids.  Denies known sick contacts.  Reports receiving COVID vaccination, denies flu vaccination.    Sore Throat   This is a new problem. Episode onset: 3 days ago. The problem has been gradually worsening. There has been no fever. The pain is at a severity of 6/10. The pain is moderate. Associated symptoms include congestion, neck pain and swollen glands. Pertinent negatives include no abdominal pain, coughing, diarrhea, drooling, ear discharge, ear pain, headaches, hoarse voice, plugged ear sensation, shortness of breath, stridor, trouble swallowing or vomiting. She has had no exposure to strep or mono. She has tried nothing for the symptoms. The treatment provided no relief.     Constitution: Positive for fever. Negative for chills, sweating and fatigue.   HENT:  Positive for congestion, postnasal drip and sore throat. Negative for ear pain, ear discharge, drooling, trouble swallowing and voice change.    Neck: Positive for neck pain. Negative for neck stiffness and neck swelling.   Cardiovascular:  Negative for chest pain.   Respiratory:  Negative for cough, shortness of breath, " stridor and wheezing.    Gastrointestinal:  Negative for abdominal pain, nausea, vomiting and diarrhea.   Neurological:  Negative for headaches.    Objective:     Physical Exam   Constitutional: She is oriented to person, place, and time. She appears well-developed. She is cooperative.  Non-toxic appearance. She does not appear ill. No distress.   HENT:   Head: Normocephalic and atraumatic.   Ears:   Right Ear: Hearing, external ear and ear canal normal. Tympanic membrane is bulging. Tympanic membrane is not erythematous and not retracted. A middle ear effusion (clear fluid) is present.   Left Ear: Hearing, external ear and ear canal normal. Tympanic membrane is bulging. Tympanic membrane is not erythematous and not retracted. A middle ear effusion (clear fluid) is present.   Nose: Rhinorrhea (clear to BL nares) present. No mucosal edema, purulent discharge or nasal deformity. No epistaxis. Right sinus exhibits no maxillary sinus tenderness and no frontal sinus tenderness. Left sinus exhibits no maxillary sinus tenderness and no frontal sinus tenderness.   Mouth/Throat: Uvula is midline and mucous membranes are normal. No trismus in the jaw. Normal dentition. No uvula swelling. Oropharyngeal exudate (large amount of clear postnasal drip), posterior oropharyngeal erythema (mild) and cobblestoning present. No posterior oropharyngeal edema. Tonsils are 1+ on the right. Tonsils are 1+ on the left. No tonsillar exudate.   Eyes: Conjunctivae and lids are normal. No scleral icterus.   Neck: Trachea normal and phonation normal. Neck supple. No edema present. No erythema present. No neck rigidity present.   Cardiovascular: Normal rate, regular rhythm, normal heart sounds and normal pulses.   Pulmonary/Chest: Effort normal and breath sounds normal. No accessory muscle usage or stridor. No tachypnea. No respiratory distress. She has no decreased breath sounds. She has no wheezes. She has no rhonchi. She has no rales.    Abdominal: Normal appearance.   Musculoskeletal: Normal range of motion.         General: No deformity. Normal range of motion.   Lymphadenopathy:        Head (right side): No submandibular adenopathy present.        Head (left side): No submandibular adenopathy present.     She has no cervical adenopathy.   Neurological: She is alert and oriented to person, place, and time. She exhibits normal muscle tone. Coordination normal.   Skin: Skin is warm, dry, intact, not diaphoretic and not pale.   Psychiatric: Her speech is normal and behavior is normal. Judgment and thought content normal.   Nursing note and vitals reviewed.    Results for orders placed or performed in visit on 05/30/23   SARS Coronavirus 2 Antigen, POCT Manual Read   Result Value Ref Range    SARS Coronavirus 2 Antigen Negative Negative     Acceptable Yes    POCT Strep A, Molecular   Result Value Ref Range    Molecular Strep A, POC Positive (A) Negative     Acceptable Yes            Assessment:     1. Strep pharyngitis    2. Sore throat        Plan:     Provided education on prescribed medications, recommended continuing NSAIDs for additional pain relief.  Recommended good handwashing, avoiding sharing cups or utensils, changing toothbrush, and laundering linens to help prevent spread and reinfection.  Provided education on return/ER precautions.  Pt verbalized understanding and agreed to plan.      Strep pharyngitis  -     amoxicillin (AMOXIL) 500 MG Tab; Take 1 tablet (500 mg total) by mouth every 12 (twelve) hours. for 10 days  Dispense: 20 tablet; Refill: 0  -     ondansetron (ZOFRAN-ODT) 4 MG TbDL; Take 1 tablet (4 mg total) by mouth 2 (two) times daily. for 10 days  Dispense: 20 tablet; Refill: 0  -     (Magic mouthwash) 1:1:1 diphenhydrAMINE(Benadryl) 12.5mg/5ml liq, aluminum & magnesium hydroxide-simethicone (Maalox), LIDOcaine viscous 2%; Swish and spit 10 mLs every 4 (four) hours as needed (throat pain).   Dispense: 360 mL; Refill: 0  -     cetirizine (ZYRTEC) 10 MG tablet; Take 1 tablet (10 mg total) by mouth once daily.  Dispense: 30 tablet; Refill: 0  -     fluticasone propionate (FLONASE) 50 mcg/actuation nasal spray; 2 sprays (100 mcg total) by Each Nostril route once daily.  Dispense: 11.1 mL; Refill: 0    Sore throat  -     SARS Coronavirus 2 Antigen, POCT Manual Read  -     POCT Strep A, Molecular      Patient Instructions   If your condition worsens or fails to improve, we recommend that you receive another evaluation at the ER immediately, contact your PCP to discuss your concerns, or return here.  You must understand that you've received an urgent care treatment only, and that you may be released before all your medical problems are known or treated.  You, the patient, will arrange for followup care as instructed.     If the strep culture was done and returns negative in 3-5 days and you are still having a sore throat, you may need to get a mono spot test done or repeated.     Tylenol or Ibuprofen for pain may help as long as you are not allergic to these meds or have a medical condition (such as stomach ulcers, liver or kidney disease, or taking blood thinners, etc.) that would prevent you from using these medications. Rest and fluids will help as well. Anti-histamines, such as Claritin, Zyrtec, and Allegra, can help with reducing postnasal drip.  Flonase nasal spray can help with nasal/sinus congestion and postnasal drip as well.  Gargling with warm salt water and drinking hot tea or soups can help with alleviating pain.    If you were prescribed antibiotics and are female and on birth control pills, use additional methods to prevent pregnancy while on the antibiotics and for one cycle after.

## 2023-05-30 NOTE — PATIENT INSTRUCTIONS
If your condition worsens or fails to improve, we recommend that you receive another evaluation at the ER immediately, contact your PCP to discuss your concerns, or return here.  You must understand that you've received an urgent care treatment only, and that you may be released before all your medical problems are known or treated.  You, the patient, will arrange for followup care as instructed.     If the strep culture was done and returns negative in 3-5 days and you are still having a sore throat, you may need to get a mono spot test done or repeated.     Tylenol or Ibuprofen for pain may help as long as you are not allergic to these meds or have a medical condition (such as stomach ulcers, liver or kidney disease, or taking blood thinners, etc.) that would prevent you from using these medications. Rest and fluids will help as well. Anti-histamines, such as Claritin, Zyrtec, and Allegra, can help with reducing postnasal drip.  Flonase nasal spray can help with nasal/sinus congestion and postnasal drip as well.  Gargling with warm salt water and drinking hot tea or soups can help with alleviating pain.    If you were prescribed antibiotics and are female and on birth control pills, use additional methods to prevent pregnancy while on the antibiotics and for one cycle after.

## 2023-06-06 ENCOUNTER — TELEPHONE (OUTPATIENT)
Dept: URGENT CARE | Facility: CLINIC | Age: 38
End: 2023-06-06
Payer: COMMERCIAL

## 2023-06-07 NOTE — TELEPHONE ENCOUNTER
Patient called in stating that she had bronchiitis following her dx of strep. Patient states our provider told her she could call back for something if she developed bronchitis. We do not have the same provider on staff today and she was not comfortable phoning in medication. Patient advised to return to clinic, follow up with pcp, and lyndseyssonia on call # was provided to patient. rudolph

## 2023-06-14 ENCOUNTER — PATIENT OUTREACH (OUTPATIENT)
Dept: ADMINISTRATIVE | Facility: HOSPITAL | Age: 38
End: 2023-06-14
Payer: COMMERCIAL

## 2023-07-31 ENCOUNTER — OFFICE VISIT (OUTPATIENT)
Dept: URGENT CARE | Facility: CLINIC | Age: 38
End: 2023-07-31
Payer: COMMERCIAL

## 2023-07-31 VITALS
SYSTOLIC BLOOD PRESSURE: 94 MMHG | WEIGHT: 137 LBS | DIASTOLIC BLOOD PRESSURE: 57 MMHG | HEART RATE: 71 BPM | HEIGHT: 67 IN | OXYGEN SATURATION: 98 % | TEMPERATURE: 98 F | BODY MASS INDEX: 21.5 KG/M2

## 2023-07-31 DIAGNOSIS — T30.0 BURN: Primary | ICD-10-CM

## 2023-07-31 PROCEDURE — 99214 PR OFFICE/OUTPT VISIT, EST, LEVL IV, 30-39 MIN: ICD-10-PCS | Mod: S$GLB,,, | Performed by: FAMILY MEDICINE

## 2023-07-31 PROCEDURE — 99214 OFFICE O/P EST MOD 30 MIN: CPT | Mod: S$GLB,,, | Performed by: FAMILY MEDICINE

## 2023-07-31 RX ORDER — MUPIROCIN 20 MG/G
OINTMENT TOPICAL
Qty: 22 G | Refills: 1 | Status: SHIPPED | OUTPATIENT
Start: 2023-07-31

## 2023-07-31 RX ORDER — IBUPROFEN 200 MG
600 TABLET ORAL
Status: COMPLETED | OUTPATIENT
Start: 2023-07-31 | End: 2023-07-31

## 2023-07-31 RX ORDER — HYDROCODONE BITARTRATE AND ACETAMINOPHEN 5; 325 MG/1; MG/1
1 TABLET ORAL EVERY 8 HOURS PRN
Qty: 6 TABLET | Refills: 0 | Status: SHIPPED | OUTPATIENT
Start: 2023-07-31 | End: 2023-08-02

## 2023-07-31 RX ADMIN — Medication 600 MG: at 03:07
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
## 2023-08-02 ENCOUNTER — PATIENT OUTREACH (OUTPATIENT)
Dept: ADMINISTRATIVE | Facility: HOSPITAL | Age: 38
End: 2023-08-02
Payer: COMMERCIAL

## 2023-08-02 ENCOUNTER — PATIENT MESSAGE (OUTPATIENT)
Dept: ADMINISTRATIVE | Facility: HOSPITAL | Age: 38
End: 2023-08-02
Payer: COMMERCIAL

## 2023-08-02 DIAGNOSIS — Z12.4 CERVICAL CANCER SCREENING: ICD-10-CM

## 2023-08-02 DIAGNOSIS — Z11.59 NEED FOR HEPATITIS C SCREENING TEST: Primary | ICD-10-CM

## 2023-09-12 DIAGNOSIS — E53.8 B12 DEFICIENCY: ICD-10-CM

## 2023-09-12 RX ORDER — CYANOCOBALAMIN 1000 UG/ML
INJECTION, SOLUTION INTRAMUSCULAR; SUBCUTANEOUS
Qty: 6 ML | Refills: 2 | Status: SHIPPED | OUTPATIENT
Start: 2023-09-12

## 2023-09-12 NOTE — TELEPHONE ENCOUNTER
Refill Encounter    PCP Visits: Recent Visits  Date Type Provider Dept   05/04/23 Office Visit Balbir Bravo MD [facility] Internal Medicine   02/09/23 Office Visit Lucila Krause DO [facility]   Showing recent visits within past 360 days and meeting all other requirements  Future Appointments  No visits were found meeting these conditions.  Showing future appointments within next 720 days and meeting all other requirements     Last 3 Blood Pressure:   BP Readings from Last 3 Encounters:   07/31/23 (!) 94/57   05/30/23 105/75   05/04/23 98/72     Preferred Pharmacy:   [facility]/pharmacy #[id number] - [place] [address] [address]  Phone: [phone] Fax: [phone]    [id number] [place] [address] [facility] & [address] [address]  Phone: [phone] Fax: [phone]    Requested RX:  Requested Prescriptions     Pending Prescriptions Disp Refills    cyanocobalamin 1,000 mcg/mL injection [Pharmacy Med Name: CYANOCOBALAMIN 1,000 MCG/ML VL] 6 mL 2     Sig: INJECT 1,000 MCG AS DIRECTED EVERY 14 (FOURTEEN) DAYS.      RX Route: Normal

## 2023-11-28 ENCOUNTER — ON-DEMAND VIRTUAL (OUTPATIENT)
Dept: URGENT CARE | Facility: CLINIC | Age: 38
End: 2023-11-28
Payer: COMMERCIAL

## 2023-11-28 DIAGNOSIS — J06.9 UPPER RESPIRATORY TRACT INFECTION, UNSPECIFIED TYPE: Primary | ICD-10-CM

## 2023-11-28 PROCEDURE — 99212 PR OFFICE/OUTPT VISIT, EST, LEVL II, 10-19 MIN: ICD-10-PCS | Mod: 95,,, | Performed by: INTERNAL MEDICINE

## 2023-11-28 PROCEDURE — 99212 OFFICE O/P EST SF 10 MIN: CPT | Mod: 95,,, | Performed by: INTERNAL MEDICINE

## 2023-11-28 NOTE — PROGRESS NOTES
Subjective:      Patient ID: Elizabeth Frankel is a 38 y.o. female.    Vitals:  vitals were not taken for this visit.     Chief Complaint: Cough      Visit Type: TELE AUDIOVISUAL    Present with the patient at the time of consultation: TELEMED PRESENT WITH PATIENT: None    Past Medical History:   Diagnosis Date    Anxiety     Asthma     B12 deficiency anemia     Hiatal hernia      Past Surgical History:   Procedure Laterality Date    WRIST SURGERY Bilateral     ligament repair     Review of patient's allergies indicates:  No Known Allergies  Current Outpatient Medications on File Prior to Visit   Medication Sig Dispense Refill    cetirizine (ZYRTEC) 10 MG tablet Take 1 tablet (10 mg total) by mouth once daily. 30 tablet 0    cyanocobalamin 1,000 mcg/mL injection INJECT 1,000 MCG AS DIRECTED EVERY 14 (FOURTEEN) DAYS. 6 mL 2    FLUoxetine 20 MG capsule Take 1 capsule (20 mg total) by mouth once daily. 90 capsule 3    hydrOXYzine HCL (ATARAX) 25 MG tablet Take 2 tablets (50 mg total) by mouth 3 (three) times daily as needed for Anxiety. 60 tablet 5    mupirocin (BACTROBAN) 2 % ointment Apply to affected area 3 times daily 22 g 1    propranoloL (INDERAL) 10 MG tablet Take 1 tablet (10 mg total) by mouth 3 (three) times daily as needed (anxiety). 30 tablet 5    PROTONIX 40 mg tablet Take 40 mg by mouth every morning.       No current facility-administered medications on file prior to visit.     Family History   Problem Relation Age of Onset    Cancer Mother         HPV anal cancer    Depression Mother     Macular degeneration Mother     Early death Mother     Asthma Father     No Known Problems Sister     No Known Problems Sister     Lung cancer Maternal Grandmother     Breast cancer Maternal Grandmother     Cancer Maternal Grandmother     No Known Problems Maternal Grandfather     No Known Problems Paternal Grandmother     No Known Problems Paternal Grandfather            [facility] Peq Odvv Intake    11/28/2023  2:25 PM CST  - Filed by Patient   Describe your reason for todays visit Cough, mucus, low grade fever, headache   What is your current physical address in the event of a medical emergency?    Are you able to take your vital signs? No   Please attach any relevant images or files          In [place] via video conference.     39 y/o woman with a 2 day history of productive cough, congestion, runny nose, chills, body aches, and fever. She is asking for a z-pack. She has not done a COVID test.     Cough  Associated symptoms include chills and a fever. Pertinent negatives include no shortness of breath.       Constitution: Positive for chills and fever.   HENT:  Positive for congestion.    Respiratory:  Positive for cough. Negative for shortness of breath and stridor.         Objective:   The physical exam was conducted virtually.  Physical Exam   Constitutional:  Non-toxic appearance. No distress.   HENT:   Nose: Rhinorrhea and congestion present.   Pulmonary/Chest: Effort normal. No stridor. No respiratory distress.         Comments: Normal verbal nii without respiratory compromise.     Neurological: She is alert.   No other pertinent findings.     Assessment:     1. Upper respiratory tract infection, unspecified type        Plan:       Upper respiratory tract infection, unspecified type        This is most likely either COVID or influenza. We need you to get tested to see which one to determine the proper treatment. You can do a COVID test at home and if this is negative then this is most likely influenza. Or you can go into urgent care and get tested for both to determine, which virus you have.     In the meantime, symptomatic care with tylenol or ibuprofen for the fever and sore throat pain. Mucinex for congestion and cough medicines for the cough. Honey is helpful as well at a teaspoon every 3-4 hours as needed for sore throat pain and cough.

## 2023-11-29 ENCOUNTER — OFFICE VISIT (OUTPATIENT)
Dept: INTERNAL MEDICINE | Facility: CLINIC | Age: 38
End: 2023-11-29
Payer: COMMERCIAL

## 2023-11-29 VITALS
WEIGHT: 129.44 LBS | SYSTOLIC BLOOD PRESSURE: 120 MMHG | BODY MASS INDEX: 20.31 KG/M2 | DIASTOLIC BLOOD PRESSURE: 80 MMHG | OXYGEN SATURATION: 98 % | HEART RATE: 63 BPM | HEIGHT: 67 IN

## 2023-11-29 DIAGNOSIS — J98.8 CONGESTION OF UPPER AIRWAY: Primary | ICD-10-CM

## 2023-11-29 DIAGNOSIS — G43.909 MIGRAINE WITHOUT STATUS MIGRAINOSUS, NOT INTRACTABLE, UNSPECIFIED MIGRAINE TYPE: ICD-10-CM

## 2023-11-29 DIAGNOSIS — J06.9 UPPER RESPIRATORY TRACT INFECTION, UNSPECIFIED TYPE: ICD-10-CM

## 2023-11-29 LAB
CTP QC/QA: YES
FLUAV AG NPH QL: NEGATIVE
FLUBV AG NPH QL: NEGATIVE
S PYO RRNA THROAT QL PROBE: NEGATIVE
SARS-COV-2 AG RESP QL IA.RAPID: NEGATIVE

## 2023-11-29 PROCEDURE — 87811 SARS-COV-2 COVID19 W/OPTIC: CPT | Mod: QW,S$GLB,, | Performed by: STUDENT IN AN ORGANIZED HEALTH CARE EDUCATION/TRAINING PROGRAM

## 2023-11-29 PROCEDURE — 87880 STREP A ASSAY W/OPTIC: CPT | Mod: QW,S$GLB,, | Performed by: STUDENT IN AN ORGANIZED HEALTH CARE EDUCATION/TRAINING PROGRAM

## 2023-11-29 PROCEDURE — 99999 PR PBB SHADOW E&M-EST. PATIENT-LVL III: CPT | Mod: PBBFAC,,, | Performed by: STUDENT IN AN ORGANIZED HEALTH CARE EDUCATION/TRAINING PROGRAM

## 2023-11-29 PROCEDURE — 99999 PR PBB SHADOW E&M-EST. PATIENT-LVL III: ICD-10-PCS | Mod: PBBFAC,,, | Performed by: STUDENT IN AN ORGANIZED HEALTH CARE EDUCATION/TRAINING PROGRAM

## 2023-11-29 PROCEDURE — 99213 PR OFFICE/OUTPT VISIT, EST, LEVL III, 20-29 MIN: ICD-10-PCS | Mod: S$GLB,,, | Performed by: STUDENT IN AN ORGANIZED HEALTH CARE EDUCATION/TRAINING PROGRAM

## 2023-11-29 PROCEDURE — 99213 OFFICE O/P EST LOW 20 MIN: CPT | Mod: S$GLB,,, | Performed by: STUDENT IN AN ORGANIZED HEALTH CARE EDUCATION/TRAINING PROGRAM

## 2023-11-29 PROCEDURE — 87811 SARS CORONAVIRUS 2 ANTIGEN POCT, MANUAL READ: ICD-10-PCS | Mod: QW,S$GLB,, | Performed by: STUDENT IN AN ORGANIZED HEALTH CARE EDUCATION/TRAINING PROGRAM

## 2023-11-29 PROCEDURE — 87804 POCT INFLUENZA A/B: ICD-10-PCS | Mod: QW,S$GLB,, | Performed by: STUDENT IN AN ORGANIZED HEALTH CARE EDUCATION/TRAINING PROGRAM

## 2023-11-29 PROCEDURE — 87880 POCT RAPID STREP A: ICD-10-PCS | Mod: QW,S$GLB,, | Performed by: STUDENT IN AN ORGANIZED HEALTH CARE EDUCATION/TRAINING PROGRAM

## 2023-11-29 PROCEDURE — 87804 INFLUENZA ASSAY W/OPTIC: CPT | Mod: QW,S$GLB,, | Performed by: STUDENT IN AN ORGANIZED HEALTH CARE EDUCATION/TRAINING PROGRAM

## 2023-11-29 RX ORDER — SUMATRIPTAN 50 MG/1
50 TABLET, FILM COATED ORAL
Qty: 15 TABLET | Refills: 1 | Status: SHIPPED | OUTPATIENT
Start: 2023-11-29

## 2023-11-29 RX ORDER — FLUTICASONE PROPIONATE 50 MCG
1 SPRAY, SUSPENSION (ML) NASAL DAILY
Qty: 16 G | Refills: 0 | Status: SHIPPED | OUTPATIENT
Start: 2023-11-29

## 2023-11-29 RX ORDER — AZITHROMYCIN 250 MG/1
TABLET, FILM COATED ORAL
Qty: 6 TABLET | Refills: 0 | Status: SHIPPED | OUTPATIENT
Start: 2023-11-29 | End: 2023-12-04

## 2023-11-29 NOTE — PROGRESS NOTES
Subjective:       Patient ID: Elizabeth Frankel is a 38 y.o. female.    Chief Complaint: Cough (Dark green mucus), Chest Pain, and Sore Throat    Cough  This is a new problem. The current episode started in the past 7 days (3 days ago). The problem has been gradually worsening. The cough is Productive of sputum. Associated symptoms include chills, headaches, myalgias, nasal congestion, postnasal drip, rhinorrhea and a sore throat. Pertinent negatives include no chest pain (not associated with exertion, tighness since onset of coughing), ear pain, fever (subjectively warm) or shortness of breath. Treatments tried: tylenol, dayquil and nyquild and advil. The treatment provided mild relief. Her past medical history is significant for asthma.     Started 3 days ago with sore throat, developed nasal congestion, PND, and cough. Coughing up green mucus. Virtual visit yesterday, noted 2 days of cough, cognestion rhinorrhea, f/c, and generalized body aches. No known sick contacts.     Tests to Keep You Healthy    Cervical Cancer Screening: DUE      Social History     Social History Narrative    Single. No children (). From [place] but she has moved around lot and most recently moved from [place], [place] to [facility] 10/2022. She lives alone. She works in sales and enjoys it. Her mother passed  from aggressive anal cancer during the peak of Covid-19 pandemic and it was very traumatic. She has 2 half sisters, 2 step sisters and 2 step brothers. 1 sister       Family History   Problem Relation Age of Onset    Cancer Mother         HPV anal cancer    Depression Mother     Macular degeneration Mother     Early death Mother     Asthma Father     No Known Problems Sister     No Known Problems Sister     Lung cancer Maternal Grandmother     Breast cancer Maternal Grandmother     Cancer Maternal Grandmother     No Known Problems Maternal Grandfather     No Known Problems Paternal Grandmother     No Known Problems Paternal  Grandfather        Current Outpatient Medications:     cyanocobalamin 1,000 mcg/mL injection, INJECT 1,000 MCG AS DIRECTED EVERY 14 (FOURTEEN) DAYS., Disp: 6 mL, Rfl: 2    FLUoxetine 20 MG capsule, Take 1 capsule (20 mg total) by mouth once daily., Disp: 90 capsule, Rfl: 3    hydrOXYzine HCL (ATARAX) 25 MG tablet, Take 2 tablets (50 mg total) by mouth 3 (three) times daily as needed for Anxiety., Disp: 60 tablet, Rfl: 5    propranoloL (INDERAL) 10 MG tablet, Take 1 tablet (10 mg total) by mouth 3 (three) times daily as needed (anxiety)., Disp: 30 tablet, Rfl: 5    PROTONIX 40 mg tablet, Take 40 mg by mouth every morning., Disp: , Rfl:     azithromycin (Z-MONY) 250 MG tablet, Take 2 tablets by mouth on day 1; Take 1 tablet by mouth on days 2-5, Disp: 6 tablet, Rfl: 0    cetirizine (ZYRTEC) 10 MG tablet, Take 1 tablet (10 mg total) by mouth once daily. (Patient not taking: Reported on 11/29/2023), Disp: 30 tablet, Rfl: 0    dextromethorphan-guaiFENesin  mg (MUCINEX DM)  mg per 12 hr tablet, Take 1 tablet by mouth 2 (two) times daily. for 10 days, Disp: 20 tablet, Rfl: 0    fluticasone propionate (FLONASE) 50 mcg/actuation nasal spray, 1 spray (50 mcg total) by Each Nostril route once daily., Disp: 16 g, Rfl: 0    mupirocin (BACTROBAN) 2 % ointment, Apply to affected area 3 times daily (Patient not taking: Reported on 11/29/2023), Disp: 22 g, Rfl: 1    sumatriptan (IMITREX) 50 MG tablet, Take 1 tablet (50 mg total) by mouth as needed for Migraine (Take no more than 4 tablets in 24H)., Disp: 15 tablet, Rfl: 1    Review of Systems   Constitutional:  Positive for chills. Negative for fever (subjectively warm).   HENT:  Positive for postnasal drip, rhinorrhea and sore throat. Negative for ear pain.    Respiratory:  Positive for cough. Negative for shortness of breath.    Cardiovascular:  Negative for chest pain (not associated with exertion, tighness since onset of coughing).   Musculoskeletal:  Positive for  "myalgias.   Neurological:  Positive for headaches.       Objective:   /80   Pulse 63   Ht 5' 7" (1.702 m)   Wt 58.7 kg (129 lb 6.6 oz)   SpO2 98%   BMI 20.27 kg/m²      Physical Exam  Vitals and nursing note reviewed.   Constitutional:       General: She is not in acute distress.     Appearance: Normal appearance. She is not ill-appearing, toxic-appearing or diaphoretic.   HENT:      Right Ear: Tympanic membrane, ear canal and external ear normal. There is no impacted cerumen.      Left Ear: Tympanic membrane, ear canal and external ear normal. There is no impacted cerumen.      Nose: Nose normal. No congestion.      Mouth/Throat:      Mouth: Mucous membranes are moist.      Pharynx: Oropharynx is clear.   Eyes:      General:         Right eye: No discharge.         Left eye: No discharge.      Conjunctiva/sclera: Conjunctivae normal.   Cardiovascular:      Rate and Rhythm: Normal rate and regular rhythm.   Pulmonary:      Effort: Pulmonary effort is normal. No respiratory distress.      Breath sounds: Normal breath sounds. No wheezing.   Abdominal:      Palpations: Abdomen is soft.      Tenderness: There is no abdominal tenderness. There is no guarding.   Neurological:      Mental Status: She is alert.   Psychiatric:         Behavior: Behavior normal.         Assessment & Plan   1. Congestion of upper airway  -discussed nasal cleanse.  - fluticasone propionate (FLONASE) 50 mcg/actuation nasal spray; 1 spray (50 mcg total) by Each Nostril route once daily.  Dispense: 16 g; Refill: 0  - dextromethorphan-guaiFENesin  mg (MUCINEX DM)  mg per 12 hr tablet; Take 1 tablet by mouth 2 (two) times daily. for 10 days  Dispense: 20 tablet; Refill: 0    2. Upper respiratory tract infection, unspecified type  -tylenol, ibuprofen prn. Discussed hydration.  - azithromycin (Z-MONY) 250 MG tablet; Take 2 tablets by mouth on day 1; Take 1 tablet by mouth on days 2-5  Dispense: 6 tablet; Refill: 0    3. Migraine " without status migrainosus, not intractable, unspecified migraine type  -previous hx of migraines, none currently, but she does endorse occurring once every other month since menses returned. Hx of Eletriptan use PRN, but was costly. Requesting alternative. Will try imitrex. Discussed if migraines increase in frequency, should f/u closely.   - sumatriptan (IMITREX) 50 MG tablet; Take 1 tablet (50 mg total) by mouth as needed for Migraine (Take no more than 4 tablets in 24H).  Dispense: 15 tablet; Refill: 1    Follow up if symptoms worsen or fail to improve.    Disclaimer:  This note may have been prepared using voice recognition software, it may have not been extensively proofed, as such there could be errors within the text such as sound alike errors.

## 2024-01-06 ENCOUNTER — E-VISIT (OUTPATIENT)
Dept: PRIMARY CARE CLINIC | Facility: CLINIC | Age: 39
End: 2024-01-06
Attending: FAMILY MEDICINE
Payer: COMMERCIAL

## 2024-01-06 DIAGNOSIS — N30.00 ACUTE CYSTITIS WITHOUT HEMATURIA: Primary | ICD-10-CM

## 2024-01-08 ENCOUNTER — TELEPHONE (OUTPATIENT)
Dept: PRIMARY CARE CLINIC | Facility: CLINIC | Age: 39
End: 2024-01-08
Payer: COMMERCIAL

## 2024-01-08 PROCEDURE — 99422 OL DIG E/M SVC 11-20 MIN: CPT | Mod: ,,, | Performed by: FAMILY MEDICINE

## 2024-01-08 RX ORDER — CEPHALEXIN 500 MG/1
500 CAPSULE ORAL EVERY 12 HOURS
Qty: 10 CAPSULE | Refills: 0 | Status: SHIPPED | OUTPATIENT
Start: 2024-01-08 | End: 2024-01-13

## 2024-01-08 NOTE — PROGRESS NOTES
Patient ID: Elizabeth Frankel is a 38 y.o. female.    Chief Complaint: Urinary Tract Infection (Entered automatically based on patient selection in Patient Portal.)    The patient initiated a request through [facility] on 1/6/2024 for evaluation and management with a chief complaint of Urinary Tract Infection (Entered automatically based on patient selection in Patient Portal.)     I evaluated the questionnaire submission on 1/8/24.    [facility] Peq Evisit Uti Questionnaire    1/6/2024  1:48 PM CST - Filed by Patient   Do you agree to participate in an E-Visit? Yes   If you have any of the following problems, please present to your local ER or call 911:  I acknowledge   What is the main issue that you would like for your doctor to address today? Urgency to urinate   Are you able to take your vital signs? No   Are you currently pregnant, could you be pregnant, or are you breast feeding? None of the above   What symptoms do you currently have? Difficulty passing urine   When did your symptoms first appear? 1/4/2024   List what you have done or taken to help your symptoms. Extra liquids   Please indicate whether you have had the following symptoms during the past 24 hours     Urgent urination (a sudden and uncontrollable urge to urinate) Moderate   Frequent urination of small amounts of urine (going to the toilet very often) Mild   Burning pain when urinating None   Incomplete bladder emptying (still feel like you need to urinate again after urination) Moderate   Pain not associated with urination in the lower abdomen below the belly button) Mild   What does your urine look like? I am not sure   Blood seen in the urine None   Flank pain (pain in one or both sides of the lower back) None   Abnormal Vaginal Discharge (abnormal amount, color and/or odor) None   Discharge from the urethra (urinary opening) without urination None   High body temperature/fever? None-<99.5   Please rate how much discomfort you have experience because  of the symptoms in the past 24 hours: Mild   Please indicate how the symptoms have interfered with your every day activities/work in the past 24 hours: Mild   Please indicate how these symptoms have interfered with your social activities (visiting people, meeting with friends, etc.) in the past 24 hours? Mild   Are you a diabetic? No   Please indicate whether you have the following at the time of completion of this questionnaire: None of the above   Provide any information you feel is important to your history not asked above    Please attach any relevant images or files (if you have performed a home test for UTI, please submit a photo of results)           Patient Active Problem List   Diagnosis    B12 deficiency    Generalized anxiety disorder    Gastroesophageal reflux disease without esophagitis    Hiatal hernia    Weight loss counseling, encounter for       Vital signs: see above     Recent Labs Obtained:    1. Acute cystitis without hematuria  -     C. trachomatis/N. gonorrhoeae by AMP DNA [facility]; Urine; Future; Expected date: 01/08/2024  -     Urinalysis, Reflex to Urine Culture Urine, Clean Catch; Future; Expected date: 01/08/2024  -     cephALEXin (KEFLEX) 500 MG capsule; Take 1 capsule (500 mg total) by mouth every 12 (twelve) hours. for 5 days  Dispense: 10 capsule; Refill: 0      - see FIGS message  E-Visit Time Tracking:    Day 1 Time (in minutes): 8  Day 2 Time (in minutes): 8     Total Time (in minutes): 8      Dr. Lucila Krause D.O.   Family Medicine

## 2024-01-09 ENCOUNTER — LAB VISIT (OUTPATIENT)
Dept: LAB | Facility: OTHER | Age: 39
End: 2024-01-09
Attending: FAMILY MEDICINE
Payer: COMMERCIAL

## 2024-01-09 DIAGNOSIS — N30.00 ACUTE CYSTITIS WITHOUT HEMATURIA: ICD-10-CM

## 2024-01-09 LAB
BILIRUB UR QL STRIP: NEGATIVE
CLARITY UR: CLEAR
COLOR UR: YELLOW
GLUCOSE UR QL STRIP: NEGATIVE
HGB UR QL STRIP: NEGATIVE
KETONES UR QL STRIP: NEGATIVE
LEUKOCYTE ESTERASE UR QL STRIP: NEGATIVE
NITRITE UR QL STRIP: NEGATIVE
PH UR STRIP: 7 [PH] (ref 5–8)
PROT UR QL STRIP: NEGATIVE
SP GR UR STRIP: 1.01 (ref 1–1.03)
URN SPEC COLLECT METH UR: NORMAL

## 2024-01-09 PROCEDURE — 87491 CHLMYD TRACH DNA AMP PROBE: CPT | Performed by: FAMILY MEDICINE

## 2024-01-09 PROCEDURE — 81003 URINALYSIS AUTO W/O SCOPE: CPT | Performed by: FAMILY MEDICINE

## 2024-01-11 LAB
C TRACH DNA SPEC QL NAA+PROBE: NOT DETECTED
N GONORRHOEA DNA SPEC QL NAA+PROBE: NOT DETECTED

## 2024-06-06 ENCOUNTER — LAB VISIT (OUTPATIENT)
Dept: LAB | Facility: HOSPITAL | Age: 39
End: 2024-06-06
Attending: STUDENT IN AN ORGANIZED HEALTH CARE EDUCATION/TRAINING PROGRAM
Payer: COMMERCIAL

## 2024-06-06 ENCOUNTER — OFFICE VISIT (OUTPATIENT)
Dept: PRIMARY CARE CLINIC | Facility: CLINIC | Age: 39
End: 2024-06-06
Payer: COMMERCIAL

## 2024-06-06 VITALS
OXYGEN SATURATION: 95 % | DIASTOLIC BLOOD PRESSURE: 50 MMHG | HEART RATE: 79 BPM | BODY MASS INDEX: 19.21 KG/M2 | SYSTOLIC BLOOD PRESSURE: 90 MMHG | WEIGHT: 122.38 LBS | HEIGHT: 67 IN

## 2024-06-06 DIAGNOSIS — R53.83 FATIGUE, UNSPECIFIED TYPE: ICD-10-CM

## 2024-06-06 DIAGNOSIS — Z11.59 NEED FOR HEPATITIS C SCREENING TEST: ICD-10-CM

## 2024-06-06 DIAGNOSIS — N64.4 BREAST PAIN, LEFT: Primary | ICD-10-CM

## 2024-06-06 DIAGNOSIS — K92.1 MELENA: ICD-10-CM

## 2024-06-06 DIAGNOSIS — R19.7 DIARRHEA, UNSPECIFIED TYPE: ICD-10-CM

## 2024-06-06 DIAGNOSIS — E78.2 MIXED HYPERLIPIDEMIA: ICD-10-CM

## 2024-06-06 DIAGNOSIS — E53.8 B12 DEFICIENCY: ICD-10-CM

## 2024-06-06 LAB
25(OH)D3+25(OH)D2 SERPL-MCNC: 36 NG/ML (ref 30–96)
ALBUMIN SERPL BCP-MCNC: 4.4 G/DL (ref 3.5–5.2)
ALP SERPL-CCNC: 45 U/L (ref 55–135)
ALT SERPL W/O P-5'-P-CCNC: 8 U/L (ref 10–44)
ANION GAP SERPL CALC-SCNC: 8 MMOL/L (ref 8–16)
AST SERPL-CCNC: 15 U/L (ref 10–40)
BASOPHILS # BLD AUTO: 0.03 K/UL (ref 0–0.2)
BASOPHILS NFR BLD: 0.6 % (ref 0–1.9)
BILIRUB SERPL-MCNC: 0.5 MG/DL (ref 0.1–1)
BUN SERPL-MCNC: 7 MG/DL (ref 6–20)
CALCIUM SERPL-MCNC: 10 MG/DL (ref 8.7–10.5)
CHLORIDE SERPL-SCNC: 108 MMOL/L (ref 95–110)
CHOLEST SERPL-MCNC: 201 MG/DL (ref 120–199)
CHOLEST/HDLC SERPL: 3.2 {RATIO} (ref 2–5)
CO2 SERPL-SCNC: 22 MMOL/L (ref 23–29)
CREAT SERPL-MCNC: 0.8 MG/DL (ref 0.5–1.4)
DIFFERENTIAL METHOD BLD: ABNORMAL
EOSINOPHIL # BLD AUTO: 0.1 K/UL (ref 0–0.5)
EOSINOPHIL NFR BLD: 1.1 % (ref 0–8)
ERYTHROCYTE [DISTWIDTH] IN BLOOD BY AUTOMATED COUNT: 12.6 % (ref 11.5–14.5)
EST. GFR  (NO RACE VARIABLE): >60 ML/MIN/1.73 M^2
FERRITIN SERPL-MCNC: 28 NG/ML (ref 20–300)
FOLATE SERPL-MCNC: 6.8 NG/ML (ref 4–24)
GLUCOSE SERPL-MCNC: 85 MG/DL (ref 70–110)
HCT VFR BLD AUTO: 39.7 % (ref 37–48.5)
HCV AB SERPL QL IA: NORMAL
HDLC SERPL-MCNC: 63 MG/DL (ref 40–75)
HDLC SERPL: 31.3 % (ref 20–50)
HGB BLD-MCNC: 13 G/DL (ref 12–16)
IMM GRANULOCYTES # BLD AUTO: 0.01 K/UL (ref 0–0.04)
IMM GRANULOCYTES NFR BLD AUTO: 0.2 % (ref 0–0.5)
IRON SERPL-MCNC: 90 UG/DL (ref 30–160)
LDLC SERPL CALC-MCNC: 122 MG/DL (ref 63–159)
LYMPHOCYTES # BLD AUTO: 1.4 K/UL (ref 1–4.8)
LYMPHOCYTES NFR BLD: 30.3 % (ref 18–48)
MAGNESIUM SERPL-MCNC: 2.3 MG/DL (ref 1.6–2.6)
MCH RBC QN AUTO: 31.6 PG (ref 27–31)
MCHC RBC AUTO-ENTMCNC: 32.7 G/DL (ref 32–36)
MCV RBC AUTO: 97 FL (ref 82–98)
MONOCYTES # BLD AUTO: 0.4 K/UL (ref 0.3–1)
MONOCYTES NFR BLD: 8.4 % (ref 4–15)
NEUTROPHILS # BLD AUTO: 2.8 K/UL (ref 1.8–7.7)
NEUTROPHILS NFR BLD: 59.4 % (ref 38–73)
NONHDLC SERPL-MCNC: 138 MG/DL
NRBC BLD-RTO: 0 /100 WBC
PLATELET # BLD AUTO: 248 K/UL (ref 150–450)
PMV BLD AUTO: 11.4 FL (ref 9.2–12.9)
POTASSIUM SERPL-SCNC: 4.2 MMOL/L (ref 3.5–5.1)
PROT SERPL-MCNC: 7.2 G/DL (ref 6–8.4)
RBC # BLD AUTO: 4.11 M/UL (ref 4–5.4)
SATURATED IRON: 23 % (ref 20–50)
SODIUM SERPL-SCNC: 138 MMOL/L (ref 136–145)
TOTAL IRON BINDING CAPACITY: 394 UG/DL (ref 250–450)
TRANSFERRIN SERPL-MCNC: 266 MG/DL (ref 200–375)
TRIGL SERPL-MCNC: 80 MG/DL (ref 30–150)
VIT B12 SERPL-MCNC: 359 PG/ML (ref 210–950)
WBC # BLD AUTO: 4.76 K/UL (ref 3.9–12.7)

## 2024-06-06 PROCEDURE — 82746 ASSAY OF FOLIC ACID SERUM: CPT | Performed by: STUDENT IN AN ORGANIZED HEALTH CARE EDUCATION/TRAINING PROGRAM

## 2024-06-06 PROCEDURE — 84425 ASSAY OF VITAMIN B-1: CPT | Performed by: STUDENT IN AN ORGANIZED HEALTH CARE EDUCATION/TRAINING PROGRAM

## 2024-06-06 PROCEDURE — 85025 COMPLETE CBC W/AUTO DIFF WBC: CPT | Performed by: STUDENT IN AN ORGANIZED HEALTH CARE EDUCATION/TRAINING PROGRAM

## 2024-06-06 PROCEDURE — 80061 LIPID PANEL: CPT | Performed by: STUDENT IN AN ORGANIZED HEALTH CARE EDUCATION/TRAINING PROGRAM

## 2024-06-06 PROCEDURE — 80053 COMPREHEN METABOLIC PANEL: CPT | Performed by: STUDENT IN AN ORGANIZED HEALTH CARE EDUCATION/TRAINING PROGRAM

## 2024-06-06 PROCEDURE — 86803 HEPATITIS C AB TEST: CPT | Performed by: FAMILY MEDICINE

## 2024-06-06 PROCEDURE — 99999 PR PBB SHADOW E&M-EST. PATIENT-LVL IV: CPT | Mod: PBBFAC,,, | Performed by: STUDENT IN AN ORGANIZED HEALTH CARE EDUCATION/TRAINING PROGRAM

## 2024-06-06 PROCEDURE — 99214 OFFICE O/P EST MOD 30 MIN: CPT | Mod: S$GLB,,, | Performed by: STUDENT IN AN ORGANIZED HEALTH CARE EDUCATION/TRAINING PROGRAM

## 2024-06-06 PROCEDURE — 84207 ASSAY OF VITAMIN B-6: CPT | Performed by: STUDENT IN AN ORGANIZED HEALTH CARE EDUCATION/TRAINING PROGRAM

## 2024-06-06 PROCEDURE — 82607 VITAMIN B-12: CPT | Performed by: STUDENT IN AN ORGANIZED HEALTH CARE EDUCATION/TRAINING PROGRAM

## 2024-06-06 PROCEDURE — 82306 VITAMIN D 25 HYDROXY: CPT | Performed by: STUDENT IN AN ORGANIZED HEALTH CARE EDUCATION/TRAINING PROGRAM

## 2024-06-06 PROCEDURE — 82728 ASSAY OF FERRITIN: CPT | Performed by: STUDENT IN AN ORGANIZED HEALTH CARE EDUCATION/TRAINING PROGRAM

## 2024-06-06 PROCEDURE — 83540 ASSAY OF IRON: CPT | Performed by: STUDENT IN AN ORGANIZED HEALTH CARE EDUCATION/TRAINING PROGRAM

## 2024-06-06 PROCEDURE — 83735 ASSAY OF MAGNESIUM: CPT | Performed by: STUDENT IN AN ORGANIZED HEALTH CARE EDUCATION/TRAINING PROGRAM

## 2024-06-06 RX ORDER — PANTOPRAZOLE SODIUM 40 MG/1
40 TABLET, DELAYED RELEASE ORAL DAILY
Qty: 90 TABLET | Refills: 3 | Status: SHIPPED | OUTPATIENT
Start: 2024-06-06 | End: 2024-06-06 | Stop reason: SDUPTHER

## 2024-06-06 RX ORDER — PANTOPRAZOLE SODIUM 40 MG/1
40 TABLET, DELAYED RELEASE ORAL 2 TIMES DAILY
Qty: 60 TABLET | Refills: 1 | Status: SHIPPED | OUTPATIENT
Start: 2024-06-06 | End: 2024-08-05

## 2024-06-06 NOTE — PROGRESS NOTES
SUBJECTIVE     Chief Complaint   Patient presents with    Establish Care       HPI  Elizabeth Frankel is a very pleasant 39 y.o. female with medical diagnoses as listed in the medical history and problem list that presents to establish care with a new provider.    Pt is UTD on age appropriate CA screening.    Family, social, surgical Hx reviewed     L lateral breast pain-started a few months ago. Pain happens almost daily, no nipple changes/discharge  Family history of grandmother on moms side w/ hx of breast cancer dx around 49 yo.    Nausea and vomiting every several days over the past few months  Does note melena and diarrhea as well  Hx of endoscopy  Hx of esophageal stricture with dilation  Hiatal hernia noted at age 26.  Not currently on PPI  No known hx of H pylori  + fatigue  Hx of vitamin b12 def    HLD: noted on previous lab work, due for repeat levels    Health Maintenance         Date Due Completion Date    Cervical Cancer Screening Never done ---    HIV Screening Never done ---    COVID-19 Vaccine (1 - 2023-24 season) Never done ---    Influenza Vaccine (Season Ended) 09/01/2024 ---    TETANUS VACCINE 02/09/2031 2/9/2021              PAST MEDICAL HISTORY:  Past Medical History:   Diagnosis Date    Anxiety     Asthma     B12 deficiency anemia     Hiatal hernia        PAST SURGICAL HISTORY:  Past Surgical History:   Procedure Laterality Date    WRIST SURGERY Bilateral     ligament repair       SOCIAL HISTORY:  Social History     Socioeconomic History    Marital status: Single   Tobacco Use    Smoking status: Former     Current packs/day: 0.50     Average packs/day: 0.5 packs/day for 7.0 years (3.5 ttl pk-yrs)     Types: Cigarettes     Passive exposure: Never    Smokeless tobacco: Never    Tobacco comments:     Quit 12 years ago   Substance and Sexual Activity    Alcohol use: Yes     Alcohol/week: 5.0 standard drinks of alcohol     Types: 5 Glasses of wine per week     Comment: socially    Drug use: Yes      Types: Marijuana     Comment: Rarely    Sexual activity: Yes     Partners: Male     Birth control/protection: Condom, None   Social History Narrative    Single. No children (). From [place] but she has moved around lot and most recently moved from [place], [place] to [facility] 10/2022. She lives alone. She works in sales and enjoys it. Her mother passed  from aggressive anal cancer during the peak of Covid-19 pandemic and it was very traumatic. She has 2 half sisters, 2 step sisters and 2 step brothers. 1 sister       FAMILY HISTORY:  Family History   Problem Relation Name Age of Onset    Cancer Mother Idalmis         HPV anal cancer    Depression Mother Idalmis     Macular degeneration Mother Idalmis     Early death Mother Idalmis     Asthma Father Randy Frankel     No Known Problems Sister half     No Known Problems Sister half     Lung cancer Maternal Grandmother Tahmina     Breast cancer Maternal Grandmother Tahmina 40 - 49    Cancer Maternal Grandmother Tahmina     No Known Problems Maternal Grandfather      No Known Problems Paternal Grandmother      No Known Problems Paternal Grandfather         ALLERGIES AND MEDICATIONS: updated and reviewed.  Review of patient's allergies indicates:  No Known Allergies  Current Outpatient Medications   Medication Sig Dispense Refill    cyanocobalamin 1,000 mcg/mL injection INJECT 1,000 MCG AS DIRECTED EVERY 14 (FOURTEEN) DAYS. 6 mL 2    FLUoxetine 20 MG capsule Take 1 capsule (20 mg total) by mouth once daily. 90 capsule 1    hydrOXYzine HCL (ATARAX) 25 MG tablet Take 2 tablets (50 mg total) by mouth 3 (three) times daily as needed for Anxiety. 60 tablet 5    sumatriptan (IMITREX) 50 MG tablet Take 1 tablet (50 mg total) by mouth as needed for Migraine (Take no more than 4 tablets in 24H). 15 tablet 1    pantoprazole (PROTONIX) 40 MG tablet Take 1 tablet (40 mg total) by mouth 2 (two) times daily. 60 tablet 1     No current facility-administered medications for this visit.  "      ROS  Review of Systems   Constitutional:  Negative for fever and weight loss.   Respiratory:  Negative for cough and shortness of breath.    Cardiovascular:  Negative for chest pain and palpitations.   Gastrointestinal:  Positive for diarrhea, melena, nausea and vomiting. Negative for abdominal pain and constipation.   Genitourinary:  Negative for dysuria.   Musculoskeletal:  Negative for back pain and joint pain.   Skin:  Negative for rash.   Neurological:  Negative for dizziness, weakness and headaches.   Psychiatric/Behavioral:  Negative for depression. The patient is not nervous/anxious.            OBJECTIVE     Physical Exam  Vitals:    06/06/24 0910   BP: (!) 90/50   Pulse: 79    Body mass index is 19.16 kg/m².  Weight: 55.5 kg (122 lb 5.7 oz)   Height: 5' 7" (170.2 cm)     Physical Exam  HENT:      Head: Normocephalic and atraumatic.      Nose: Nose normal.      Mouth/Throat:      Mouth: Mucous membranes are moist.      Pharynx: Oropharynx is clear.   Eyes:      Extraocular Movements: Extraocular movements intact.      Conjunctiva/sclera: Conjunctivae normal.      Pupils: Pupils are equal, round, and reactive to light.   Cardiovascular:      Rate and Rhythm: Normal rate and regular rhythm.   Pulmonary:      Effort: Pulmonary effort is normal.      Breath sounds: Normal breath sounds.   Musculoskeletal:         General: No swelling. Normal range of motion.      Cervical back: Normal range of motion.      Right lower leg: No edema.      Left lower leg: No edema.   Skin:     General: Skin is warm.      Findings: No lesion or rash.   Neurological:      General: No focal deficit present.      Mental Status: She is alert and oriented to person, place, and time.      Motor: No weakness.   Psychiatric:         Mood and Affect: Mood normal.         Thought Content: Thought content normal.               ASSESSMENT     39 y.o. female with     1. Breast pain, left    2. B12 deficiency    3. Fatigue, unspecified " type    4. Melena    5. Diarrhea, unspecified type    6. Mixed hyperlipidemia        PLAN:     1. Breast pain, left  -     Cancel: US Breast Left Limited; Future; Expected date: 06/06/2024  Follow up breast US    2. B12 deficiency  Overview:  - evaluated for atrophic gastritis with EGD and negative  Follow up b12 level    3. Fatigue, unspecified type  -     Magnesium; Future; Expected date: 06/06/2024  -     CBC Auto Differential; Future; Expected date: 06/06/2024  -     Comprehensive Metabolic Panel; Future; Expected date: 06/06/2024  -     Ferritin; Future; Expected date: 06/06/2024  -     Iron and TIBC; Future; Expected date: 06/06/2024  -     Vitamin B6; Future; Expected date: 06/06/2024  -     Vitamin B1; Future; Expected date: 06/06/2024  -     Folate; Future; Expected date: 06/06/2024  -     Vitamin B12; Future; Expected date: 06/06/2024  -     Vitamin D; Future; Expected date: 06/06/2024  Follow up lab work.      4. Melena  -     H. pylori antigen, stool; Future; Expected date: 06/06/2024  -     Occult blood x 1, stool; Future; Expected date: 06/06/2024  -     WBC, Stool; Future; Expected date: 06/06/2024  -     Stool culture; Future; Expected date: 06/06/2024  -     Giardia / Cryptosporidum, EIA; Future; Expected date: 06/06/2024  -     Stool Exam-Ova,Cysts,Parasites; Future; Expected date: 06/06/2024  -     Rotavirus antigen, stool; Future; Expected date: 06/06/2024  -     Discontinue: pantoprazole (PROTONIX) 40 MG tablet; Take 1 tablet (40 mg total) by mouth once daily.  Dispense: 90 tablet; Refill: 3  -     pantoprazole (PROTONIX) 40 MG tablet; Take 1 tablet (40 mg total) by mouth 2 (two) times daily.  Dispense: 60 tablet; Refill: 1  Follow up labs  Start protonix 40 mg bid    5. Diarrhea, unspecified type  -     H. pylori antigen, stool; Future; Expected date: 06/06/2024  -     Occult blood x 1, stool; Future; Expected date: 06/06/2024  -     WBC, Stool; Future; Expected date: 06/06/2024  -     Stool  culture; Future; Expected date: 06/06/2024  -     Giardia / Cryptosporidum, EIA; Future; Expected date: 06/06/2024  -     Stool Exam-Ova,Cysts,Parasites; Future; Expected date: 06/06/2024  -     Rotavirus antigen, stool; Future; Expected date: 06/06/2024    6. Mixed hyperlipidemia  -     Lipid Panel; Future; Expected date: 06/06/2024        Yelena Jimenez MD

## 2024-06-07 ENCOUNTER — HOSPITAL ENCOUNTER (OUTPATIENT)
Dept: RADIOLOGY | Facility: OTHER | Age: 39
Discharge: HOME | End: 2024-06-07
Attending: STUDENT IN AN ORGANIZED HEALTH CARE EDUCATION/TRAINING PROGRAM
Payer: COMMERCIAL

## 2024-06-07 DIAGNOSIS — N64.4 BREAST PAIN, LEFT: ICD-10-CM

## 2024-06-07 PROCEDURE — 77062 BREAST TOMOSYNTHESIS BI: CPT | Mod: TC

## 2024-06-07 PROCEDURE — 77062 BREAST TOMOSYNTHESIS BI: CPT | Mod: 26,,, | Performed by: RADIOLOGY

## 2024-06-07 PROCEDURE — 77066 DX MAMMO INCL CAD BI: CPT | Mod: 26,,, | Performed by: RADIOLOGY

## 2024-06-10 ENCOUNTER — LAB VISIT (OUTPATIENT)
Dept: LAB | Facility: OTHER | Age: 39
End: 2024-06-10
Attending: STUDENT IN AN ORGANIZED HEALTH CARE EDUCATION/TRAINING PROGRAM
Payer: COMMERCIAL

## 2024-06-10 ENCOUNTER — PATIENT MESSAGE (OUTPATIENT)
Dept: PRIMARY CARE CLINIC | Facility: CLINIC | Age: 39
End: 2024-06-10
Payer: COMMERCIAL

## 2024-06-10 DIAGNOSIS — K92.1 MELENA: ICD-10-CM

## 2024-06-10 DIAGNOSIS — R19.7 DIARRHEA, UNSPECIFIED TYPE: ICD-10-CM

## 2024-06-10 LAB — OB PNL STL: NEGATIVE

## 2024-06-10 PROCEDURE — 87449 NOS EACH ORGANISM AG IA: CPT | Performed by: STUDENT IN AN ORGANIZED HEALTH CARE EDUCATION/TRAINING PROGRAM

## 2024-06-10 PROCEDURE — 87338 HPYLORI STOOL AG IA: CPT | Performed by: STUDENT IN AN ORGANIZED HEALTH CARE EDUCATION/TRAINING PROGRAM

## 2024-06-10 PROCEDURE — 87328 CRYPTOSPORIDIUM AG IA: CPT | Performed by: STUDENT IN AN ORGANIZED HEALTH CARE EDUCATION/TRAINING PROGRAM

## 2024-06-10 PROCEDURE — 87045 FECES CULTURE AEROBIC BACT: CPT | Performed by: STUDENT IN AN ORGANIZED HEALTH CARE EDUCATION/TRAINING PROGRAM

## 2024-06-10 PROCEDURE — 87427 SHIGA-LIKE TOXIN AG IA: CPT | Performed by: STUDENT IN AN ORGANIZED HEALTH CARE EDUCATION/TRAINING PROGRAM

## 2024-06-10 PROCEDURE — 87425 ROTAVIRUS AG IA: CPT | Performed by: STUDENT IN AN ORGANIZED HEALTH CARE EDUCATION/TRAINING PROGRAM

## 2024-06-10 PROCEDURE — 82272 OCCULT BLD FECES 1-3 TESTS: CPT | Performed by: STUDENT IN AN ORGANIZED HEALTH CARE EDUCATION/TRAINING PROGRAM

## 2024-06-10 PROCEDURE — 87209 SMEAR COMPLEX STAIN: CPT | Performed by: STUDENT IN AN ORGANIZED HEALTH CARE EDUCATION/TRAINING PROGRAM

## 2024-06-10 PROCEDURE — 87046 STOOL CULTR AEROBIC BACT EA: CPT | Performed by: STUDENT IN AN ORGANIZED HEALTH CARE EDUCATION/TRAINING PROGRAM

## 2024-06-10 PROCEDURE — 89055 LEUKOCYTE ASSESSMENT FECAL: CPT | Performed by: STUDENT IN AN ORGANIZED HEALTH CARE EDUCATION/TRAINING PROGRAM

## 2024-06-10 PROCEDURE — 87329 GIARDIA AG IA: CPT | Performed by: STUDENT IN AN ORGANIZED HEALTH CARE EDUCATION/TRAINING PROGRAM

## 2024-06-11 LAB
CRYPTOSP AG STL QL IA: NEGATIVE
G LAMBLIA AG STL QL IA: NEGATIVE
RV AG STL QL IA.RAPID: NEGATIVE
VIT B1 BLD-MCNC: 57 UG/L (ref 38–122)
WBC #/AREA STL HPF: NORMAL /[HPF]

## 2024-06-11 NOTE — TELEPHONE ENCOUNTER
Pt was just seen 060/06 and has man new issues. White spots on skin, fingertips are purple with a pins and needle type feeling. Still reporting lightheadedness, pt is asking for advice for these symptoms

## 2024-06-12 LAB
E COLI SXT1 STL QL IA: NEGATIVE
E COLI SXT2 STL QL IA: NEGATIVE
PYRIDOXAL SERPL-MCNC: 14 UG/L (ref 5–50)

## 2024-06-13 ENCOUNTER — OFFICE VISIT (OUTPATIENT)
Dept: URGENT CARE | Facility: CLINIC | Age: 39
End: 2024-06-13
Payer: COMMERCIAL

## 2024-06-13 VITALS
SYSTOLIC BLOOD PRESSURE: 96 MMHG | DIASTOLIC BLOOD PRESSURE: 65 MMHG | HEIGHT: 67 IN | WEIGHT: 122 LBS | HEART RATE: 62 BPM | OXYGEN SATURATION: 99 % | TEMPERATURE: 99 F | RESPIRATION RATE: 16 BRPM | BODY MASS INDEX: 19.15 KG/M2

## 2024-06-13 DIAGNOSIS — J02.9 ACUTE VIRAL PHARYNGITIS: Primary | ICD-10-CM

## 2024-06-13 LAB
CTP QC/QA: YES
CTP QC/QA: YES
MOLECULAR STREP A: NEGATIVE
SARS-COV-2 AG RESP QL IA.RAPID: NEGATIVE

## 2024-06-13 PROCEDURE — 99213 OFFICE O/P EST LOW 20 MIN: CPT | Mod: S$GLB,,,

## 2024-06-13 PROCEDURE — 87811 SARS-COV-2 COVID19 W/OPTIC: CPT | Mod: QW,S$GLB,,

## 2024-06-13 PROCEDURE — 87651 STREP A DNA AMP PROBE: CPT | Mod: QW,S$GLB,,

## 2024-06-13 NOTE — PATIENT INSTRUCTIONS

## 2024-06-13 NOTE — PROGRESS NOTES
"Subjective:      Patient ID: Elizabeth Frankel is a 39 y.o. female.    Vitals:  height is 5' 7" (1.702 m) and weight is 55.3 kg (122 lb). Her oral temperature is 98.6 °F (37 °C). Her blood pressure is 96/65 and her pulse is 62. Her respiration is 16 and oxygen saturation is 99%.     Chief Complaint: Sore Throat    39-year-old female here with sore throat, body aches over the past 3 days.  No known sick contacts.  Denies fever, chills, nausea, vomiting, diarrhea, abdominal pain, chest pain, SOB.    Sore Throat   This is a new problem. Episode onset: 2 days. The problem has been unchanged. Neither side of throat is experiencing more pain than the other. Pertinent negatives include no abdominal pain, congestion, coughing, diarrhea, ear pain, headaches, neck pain, shortness of breath or vomiting. She has tried acetaminophen for the symptoms. The treatment provided mild relief.       Constitution: Negative for chills and fever.   HENT:  Positive for sore throat. Negative for ear pain and congestion.    Neck: Negative for neck pain.   Cardiovascular:  Negative for chest pain.   Respiratory:  Negative for cough and shortness of breath.    Gastrointestinal:  Negative for abdominal pain, nausea, vomiting and diarrhea.   Genitourinary:  Negative for dysuria.   Musculoskeletal:  Positive for muscle ache.   Allergic/Immunologic: Negative for sneezing.   Neurological:  Negative for dizziness and headaches.      Objective:     Physical Exam   Constitutional: She is oriented to person, place, and time. She appears well-developed.   HENT:   Head: Normocephalic and atraumatic.   Ears:   Right Ear: External ear normal.   Left Ear: External ear normal.   Nose: Nose normal.   Mouth/Throat: Uvula is midline, oropharynx is clear and moist and mucous membranes are normal. Mucous membranes are moist. Tonsils are 1+ on the right. Tonsils are 1+ on the left. No tonsillar exudate. Oropharynx is clear.   Eyes: Conjunctivae, EOM and lids are " normal. Pupils are equal, round, and reactive to light.   Neck: Trachea normal and phonation normal. Neck supple.   Musculoskeletal: Normal range of motion.         General: Normal range of motion.   Neurological: She is alert and oriented to person, place, and time.   Skin: Skin is warm, dry and intact.   Psychiatric: Her speech is normal and behavior is normal. Judgment and thought content normal.   Nursing note and vitals reviewed.    Results for orders placed or performed in visit on 06/13/24   SARS Coronavirus 2 Antigen, POCT Manual Read   Result Value Ref Range    SARS Coronavirus 2 Antigen Negative Negative     Acceptable Yes    POCT Strep A, Molecular   Result Value Ref Range    Molecular Strep A, POC Negative Negative     Acceptable Yes          Assessment:     1. Acute viral pharyngitis        Plan:       Acute viral pharyngitis  -     SARS Coronavirus 2 Antigen, POCT Manual Read  -     POCT Strep A, Molecular              Patient Instructions   - Rest.    - Drink plenty of fluids.  - Viral upper respiratory infections typically run their course in 10-14 days.      - You can take over-the-counter claritin, zyrtec, allegra, or xyzal as directed. These are antihistamines that can help with runny nose, nasal congestion, sneezing, and helps to dry up post-nasal drip, which usually causes sore throat and cough.              - If you do NOT have high blood pressure, you may use a decongestant form (D)  of this medication (ie. Claritin- D, zyrtec-D, allegra-D) or if you do not take the D form, you can take sudafed (pseudoephedrine) over the counter, which is a decongestant. Do NOT take two decongestant (D) medications at the same time (such as mucinex-D and claritin-D or plain sudafed and claritin D)    - If you DO have Hypertension, anxiety, or palpitations, it is safe to take Coricidin HBP for relief of sinus symptoms.     - You can use Flonase (fluticasone) nasal spray as directed  for sinus congestion and postnasal drip. This is a steroid nasal spray that works locally over time to decrease the inflammation in your nose/sinuses and help with allergic symptoms. This is not an quick- relief spray like afrin, but it works well if used daily.  Discontinue if you develop nose bleed  - use nasal saline prior to Flonase.  - Use Ocean Spray Nasal Saline 1-3 puffs each nostril every 2-3 hours then blow out onto tissue. This is to irrigate the nasal passage way to clear the sinus openings. Use until sinus problem resolved.     - you can take plain Mucinex (guaifenesin) 1200 mg twice a day to help loosen mucous.      -warm salt water gargles can help with sore throat     - warm tea with honey can help with cough. Honey is a natural cough suppressant.     - Dextromethorphan (DM) is a cough suppressant over the counter (ie. mucinex DM, robitussin, delsym; dayquil/nyquil has DM as well.)        - Follow up with your PCP or specialty clinic as directed in the next 1-2 weeks if not improved or as needed.  You can call [phone] to schedule an appointment with the appropriate provider.       - Go to the ER if you develop new or worsening symptoms.      - You must understand that you have received an Urgent Care treatment only and that you may be released before all of your medical problems are known or treated.   - You, the patient, will arrange for follow up care as instructed.   - If your condition worsens or fails to improve we recommend that you receive another evaluation at the ER immediately or contact your PCP to discuss your concerns or return here.

## 2024-06-15 LAB — O+P STL MICRO: NORMAL

## 2024-06-16 LAB — BACTERIA STL CULT: NORMAL

## 2024-06-19 LAB — H PYLORI AG STL QL IA: NOT DETECTED

## 2024-07-20 ENCOUNTER — HOSPITAL ENCOUNTER (EMERGENCY)
Facility: OTHER | Age: 39
Discharge: HOME | End: 2024-07-20
Attending: EMERGENCY MEDICINE
Payer: COMMERCIAL

## 2024-07-20 VITALS
SYSTOLIC BLOOD PRESSURE: 117 MMHG | DIASTOLIC BLOOD PRESSURE: 60 MMHG | HEART RATE: 70 BPM | RESPIRATION RATE: 18 BRPM | HEIGHT: 67 IN | BODY MASS INDEX: 18.83 KG/M2 | TEMPERATURE: 98 F | OXYGEN SATURATION: 99 % | WEIGHT: 120 LBS
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
## 2024-07-20 DIAGNOSIS — S09.90XA CLOSED HEAD INJURY, INITIAL ENCOUNTER: ICD-10-CM

## 2024-07-20 DIAGNOSIS — R07.81 RIB PAIN ON RIGHT SIDE: ICD-10-CM

## 2024-07-20 DIAGNOSIS — R55 SYNCOPE: Primary | ICD-10-CM

## 2024-07-20 LAB
ALBUMIN SERPL BCP-MCNC: 3.9 G/DL (ref 3.5–5.2)
ALP SERPL-CCNC: 40 U/L (ref 55–135)
ALT SERPL W/O P-5'-P-CCNC: 14 U/L (ref 10–44)
AMPHET+METHAMPHET UR QL: NEGATIVE
ANION GAP SERPL CALC-SCNC: 11 MMOL/L (ref 8–16)
AST SERPL-CCNC: 20 U/L (ref 10–40)
B-HCG UR QL: NEGATIVE
BASOPHILS # BLD AUTO: 0.02 K/UL (ref 0–0.2)
BASOPHILS NFR BLD: 0.2 % (ref 0–1.9)
BENZODIAZ UR QL SCN>200 NG/ML: NEGATIVE
BILIRUB SERPL-MCNC: 0.6 MG/DL (ref 0.1–1)
BILIRUB UR QL STRIP: ABNORMAL
BUN SERPL-MCNC: 9 MG/DL (ref 6–20)
BZE UR QL SCN: ABNORMAL
CALCIUM SERPL-MCNC: 9.2 MG/DL (ref 8.7–10.5)
CANNABINOIDS UR QL SCN: ABNORMAL
CHLORIDE SERPL-SCNC: 108 MMOL/L (ref 95–110)
CLARITY UR: ABNORMAL
CO2 SERPL-SCNC: 21 MMOL/L (ref 23–29)
COLOR UR: YELLOW
CREAT SERPL-MCNC: 0.9 MG/DL (ref 0.5–1.4)
CREAT UR-MCNC: 347.6 MG/DL (ref 15–325)
CTP QC/QA: YES
D DIMER PPP IA.FEU-MCNC: 0.57 MG/L FEU
DIFFERENTIAL METHOD BLD: ABNORMAL
EOSINOPHIL # BLD AUTO: 0 K/UL (ref 0–0.5)
EOSINOPHIL NFR BLD: 0.3 % (ref 0–8)
ERYTHROCYTE [DISTWIDTH] IN BLOOD BY AUTOMATED COUNT: 12.2 % (ref 11.5–14.5)
EST. GFR  (NO RACE VARIABLE): >60 ML/MIN/1.73 M^2
GLUCOSE SERPL-MCNC: 106 MG/DL (ref 70–110)
GLUCOSE UR QL STRIP: NEGATIVE
HCT VFR BLD AUTO: 32.3 % (ref 37–48.5)
HGB BLD-MCNC: 11 G/DL (ref 12–16)
HGB UR QL STRIP: NEGATIVE
IMM GRANULOCYTES # BLD AUTO: 0.04 K/UL (ref 0–0.04)
IMM GRANULOCYTES NFR BLD AUTO: 0.4 % (ref 0–0.5)
KETONES UR QL STRIP: ABNORMAL
LEUKOCYTE ESTERASE UR QL STRIP: NEGATIVE
LYMPHOCYTES # BLD AUTO: 1.2 K/UL (ref 1–4.8)
LYMPHOCYTES NFR BLD: 11.9 % (ref 18–48)
MCH RBC QN AUTO: 31.3 PG (ref 27–31)
MCHC RBC AUTO-ENTMCNC: 34.1 G/DL (ref 32–36)
MCV RBC AUTO: 92 FL (ref 82–98)
METHADONE UR QL SCN>300 NG/ML: NEGATIVE
MONOCYTES # BLD AUTO: 0.6 K/UL (ref 0.3–1)
MONOCYTES NFR BLD: 5.5 % (ref 4–15)
NEUTROPHILS # BLD AUTO: 8.1 K/UL (ref 1.8–7.7)
NEUTROPHILS NFR BLD: 81.7 % (ref 38–73)
NITRITE UR QL STRIP: NEGATIVE
NRBC BLD-RTO: 0 /100 WBC
OPIATES UR QL SCN: NEGATIVE
PCP UR QL SCN>25 NG/ML: NEGATIVE
PH UR STRIP: 6 [PH] (ref 5–8)
PLATELET # BLD AUTO: 215 K/UL (ref 150–450)
PMV BLD AUTO: 10.4 FL (ref 9.2–12.9)
POTASSIUM SERPL-SCNC: 3.6 MMOL/L (ref 3.5–5.1)
PROT SERPL-MCNC: 6.1 G/DL (ref 6–8.4)
PROT UR QL STRIP: ABNORMAL
RBC # BLD AUTO: 3.51 M/UL (ref 4–5.4)
SODIUM SERPL-SCNC: 140 MMOL/L (ref 136–145)
SP GR UR STRIP: >=1.03 (ref 1–1.03)
TOXICOLOGY INFORMATION: ABNORMAL
TROPONIN I SERPL DL<=0.01 NG/ML-MCNC: <0.006 NG/ML (ref 0–0.03)
URN SPEC COLLECT METH UR: ABNORMAL
UROBILINOGEN UR STRIP-ACNC: NEGATIVE EU/DL
WBC # BLD AUTO: 9.93 K/UL (ref 3.9–12.7)

## 2024-07-20 PROCEDURE — 80053 COMPREHEN METABOLIC PANEL: CPT

## 2024-07-20 PROCEDURE — 99285 EMERGENCY DEPT VISIT HI MDM: CPT | Mod: 25

## 2024-07-20 PROCEDURE — 96361 HYDRATE IV INFUSION ADD-ON: CPT

## 2024-07-20 PROCEDURE — 63600175 PHARM REV CODE 636 W HCPCS

## 2024-07-20 PROCEDURE — 93010 ELECTROCARDIOGRAM REPORT: CPT | Mod: ,,, | Performed by: INTERNAL MEDICINE

## 2024-07-20 PROCEDURE — 25000003 PHARM REV CODE 250

## 2024-07-20 PROCEDURE — 85379 FIBRIN DEGRADATION QUANT: CPT

## 2024-07-20 PROCEDURE — 25500020 PHARM REV CODE 255

## 2024-07-20 PROCEDURE — 81025 URINE PREGNANCY TEST: CPT

## 2024-07-20 PROCEDURE — 81003 URINALYSIS AUTO W/O SCOPE: CPT

## 2024-07-20 PROCEDURE — 96374 THER/PROPH/DIAG INJ IV PUSH: CPT

## 2024-07-20 PROCEDURE — 93005 ELECTROCARDIOGRAM TRACING: CPT

## 2024-07-20 PROCEDURE — 85025 COMPLETE CBC W/AUTO DIFF WBC: CPT

## 2024-07-20 PROCEDURE — 96375 TX/PRO/DX INJ NEW DRUG ADDON: CPT

## 2024-07-20 PROCEDURE — 84484 ASSAY OF TROPONIN QUANT: CPT

## 2024-07-20 PROCEDURE — 80307 DRUG TEST PRSMV CHEM ANLYZR: CPT

## 2024-07-20 RX ORDER — KETOROLAC TROMETHAMINE 30 MG/ML
15 INJECTION, SOLUTION INTRAMUSCULAR; INTRAVENOUS
Status: COMPLETED | OUTPATIENT
Start: 2024-07-20 | End: 2024-07-20

## 2024-07-20 RX ORDER — LIDOCAINE 50 MG/G
1 PATCH TOPICAL DAILY
Qty: 9 PATCH | Refills: 0 | Status: SHIPPED | OUTPATIENT
Start: 2024-07-20

## 2024-07-20 RX ORDER — ORPHENADRINE CITRATE 30 MG/ML
30 INJECTION INTRAMUSCULAR; INTRAVENOUS
Status: COMPLETED | OUTPATIENT
Start: 2024-07-20 | End: 2024-07-20

## 2024-07-20 RX ORDER — NAPROXEN 500 MG/1
500 TABLET ORAL 2 TIMES DAILY WITH MEALS
Qty: 20 TABLET | Refills: 0 | Status: SHIPPED | OUTPATIENT
Start: 2024-07-20

## 2024-07-20 RX ORDER — METHOCARBAMOL 500 MG/1
500 TABLET, FILM COATED ORAL 3 TIMES DAILY
Qty: 20 TABLET | Refills: 0 | Status: SHIPPED | OUTPATIENT
Start: 2024-07-20 | End: 2024-07-27

## 2024-07-20 RX ORDER — ONDANSETRON 4 MG/1
4 TABLET, ORALLY DISINTEGRATING ORAL EVERY 8 HOURS PRN
Qty: 30 TABLET | Refills: 0 | Status: SHIPPED | OUTPATIENT
Start: 2024-07-20

## 2024-07-20 RX ORDER — LIDOCAINE 50 MG/G
2 PATCH TOPICAL ONCE
Status: DISCONTINUED | OUTPATIENT
Start: 2024-07-20 | End: 2024-07-21 | Stop reason: HOSPADM

## 2024-07-20 RX ADMIN — SODIUM CHLORIDE 1000 ML: 9 INJECTION, SOLUTION INTRAVENOUS at 08:07

## 2024-07-20 RX ADMIN — IOHEXOL 75 ML: 350 INJECTION, SOLUTION INTRAVENOUS at 08:07

## 2024-07-20 RX ADMIN — ORPHENADRINE CITRATE 30 MG: 60 INJECTION INTRAMUSCULAR; INTRAVENOUS at 07:07

## 2024-07-20 RX ADMIN — LIDOCAINE 2 PATCH: 50 PATCH CUTANEOUS at 09:07

## 2024-07-20 RX ADMIN — KETOROLAC TROMETHAMINE 15 MG: 30 INJECTION, SOLUTION INTRAMUSCULAR; INTRAVENOUS at 07:07

## 2024-07-20 RX ADMIN — SODIUM CHLORIDE 1000 ML: 9 INJECTION, SOLUTION INTRAVENOUS at 07:07

## 2024-07-21 LAB
OHS QRS DURATION: 86 MS
OHS QTC CALCULATION: 465 MS

## 2024-07-21 NOTE — DISCHARGE INSTRUCTIONS
-- Rest your body. Get plenty of sleep. Alternate rest with light activity like walking. Avoid heavy exercise if it makes you feel worse.  -- Rest your brain. For the first day, stay away from doing things that need a lot of thought or focus. Stay away from TV, computers, phone screens, and video games. After the first day, slowly introduce these activities. Stop them if they make you feel worse.    You may take naproxen and tylenol as needed for headache and body pain.   Zofran can help with nausea  Robaxin can be used as needed for rib pain, however, try to limit use if you are extremely tired as this will make you more tired.    Contact the concussion clinic to scheduled follow up.    Return to the ER immediately if:   o You have trouble speaking or seeing.   o You have trouble walking or cannot move a part of your body like an arm or leg.   o You have a seizure.   o You develop severe or worsening headaches.   o You start throwing up.

## 2024-07-21 NOTE — ED PROVIDER NOTES
"Encounter Date: 7/20/2024       History     Chief Complaint   Patient presents with    Loss of Consciousness     Syncope while standing, lasting 30 seconds, + head injury, c/o R eye pain     This is a 39 y.o female who presents to the ED via EMS after a witnessed syncopal episode that occurred just prior to arrival. Patient's partner at bedside states that they had just finished eating at a restaurant when she started having a stomach ache and got up to go to the car while he paid. He states that she was walking down a ramp when she lost consciousness and fell to the ground, landing on her right side and hitting her face and head. He states that she was unconscious for about 30 seconds before coming to. When she came to, she was fully oriented but felt extremely tired. Patient complains of pain to the right side of her face and her right ribs. States the pain is worse with deep breaths. She reports one other syncopal episode "years ago." Patient does state that her blood pressure is low at baseline. Denies chest pain, shortness of breath, vomiting, urinary symptoms.    The history is provided by the patient and a friend.     Review of patient's allergies indicates:  No Known Allergies  Past Medical History:   Diagnosis Date    Anxiety     Asthma     B12 deficiency anemia     Hiatal hernia      Past Surgical History:   Procedure Laterality Date    WRIST SURGERY Bilateral     ligament repair     Family History   Problem Relation Name Age of Onset    Cancer Mother Idalmis         HPV anal cancer    Depression Mother Idalmis     Macular degeneration Mother Idalmis     Early death Mother Idalmis     Asthma Father Randy Frankel     No Known Problems Sister half     No Known Problems Sister half     Lung cancer Maternal Grandmother Tahmina     Breast cancer Maternal Grandmother Tahmina 40 - 49    Cancer Maternal Grandmother Tahmina     No Known Problems Maternal Grandfather      No Known Problems Paternal Grandmother      No Known " Problems Paternal Grandfather       Social History     Tobacco Use    Smoking status: Former     Current packs/day: 0.50     Average packs/day: 0.5 packs/day for 7.0 years (3.5 ttl pk-yrs)     Types: Cigarettes     Passive exposure: Never    Smokeless tobacco: Never    Tobacco comments:     Quit 12 years ago   Substance Use Topics    Alcohol use: Yes     Alcohol/week: 5.0 standard drinks of alcohol     Types: 5 Glasses of wine per week     Comment: socially    Drug use: Yes     Types: Marijuana     Comment: Rarely     Review of Systems  10 point ROS performed and negative except as stated in HPI   Physical Exam     Initial Vitals [07/20/24 1754]   BP Pulse Resp Temp SpO2   (!) 95/52 68 14 97.9 °F (36.6 °C) 100 %      MAP       --         Physical Exam    Constitutional: She appears well-developed and well-nourished.  Non-toxic appearance. She does not appear ill. No distress.   HENT:   Head: Normocephalic. Head is with contusion.       Eyes: Conjunctivae are normal.   Neck: Neck supple.   Cardiovascular:  Normal rate, regular rhythm and normal heart sounds.           Pulmonary/Chest: Effort normal and breath sounds normal. No tachypnea. No respiratory distress. She has no decreased breath sounds. She has no wheezes.   Musculoskeletal:      Cervical back: Neck supple.     Neurological: She is alert and oriented to person, place, and time. She exhibits normal muscle tone. GCS eye subscore is 3. GCS verbal subscore is 5. GCS motor subscore is 6.   Skin: Skin is warm, dry and intact.   Psychiatric: She has a normal mood and affect. Her speech is normal and behavior is normal.         ED Course   Procedures  Labs Reviewed   D DIMER, QUANTITATIVE - Abnormal       Result Value    D-Dimer 0.57 (*)    CBC W/ AUTO DIFFERENTIAL - Abnormal    WBC 9.93      RBC 3.51 (*)     Hemoglobin 11.0 (*)     Hematocrit 32.3 (*)     MCV 92      MCH 31.3 (*)     MCHC 34.1      RDW 12.2      Platelets 215      MPV 10.4      Immature  Granulocytes 0.4      Gran # (ANC) 8.1 (*)     Immature Grans (Abs) 0.04      Lymph # 1.2      Mono # 0.6      Eos # 0.0      Baso # 0.02      nRBC 0      Gran % 81.7 (*)     Lymph % 11.9 (*)     Mono % 5.5      Eosinophil % 0.3      Basophil % 0.2      Differential Method Automated     COMPREHENSIVE METABOLIC PANEL - Abnormal    Sodium 140      Potassium 3.6      Chloride 108      CO2 21 (*)     Glucose 106      BUN 9      Creatinine 0.9      Calcium 9.2      Total Protein 6.1      Albumin 3.9      Total Bilirubin 0.6      Alkaline Phosphatase 40 (*)     AST 20      ALT 14      eGFR >60      Anion Gap 11     URINALYSIS, REFLEX TO URINE CULTURE - Abnormal    Specimen UA Urine, Clean Catch      Color, UA Yellow      Appearance, UA Hazy (*)     pH, UA 6.0      Specific Gravity, UA >=1.030 (*)     Protein, UA Trace (*)     Glucose, UA Negative      Ketones, UA Trace (*)     Bilirubin (UA) 1+ (*)     Occult Blood UA Negative      Nitrite, UA Negative      Urobilinogen, UA Negative      Leukocytes, UA Negative      Narrative:     Specimen Source->Urine   DRUG SCREEN PANEL, URINE EMERGENCY - Abnormal    Benzodiazepines Negative      Methadone metabolites Negative      Cocaine (Metab.) Presumptive Positive (*)     Opiate Scrn, Ur Negative      Amphetamine Screen, Ur Negative      THC Presumptive Positive (*)     Phencyclidine Negative      Creatinine, Urine 347.6 (*)     Toxicology Information SEE COMMENT      Narrative:     Specimen Source->Urine   TROPONIN I    Troponin I <0.006     POCT URINE PREGNANCY    POC Preg Test, Ur Negative       Acceptable Yes            Imaging Results              CTA Chest Non-Coronary (PE Studies) (Final result)  Result time 07/20/24 21:18:55      Final result by Bhanu Hayes MD (07/20/24 21:18:55)                   Impression:      No acute abnormality of the chest. Specifically, no evidence of pulmonary thromboembolism.      Electronically signed by: Bhanu Hayes  MD  Date:    07/20/2024  Time:    21:18               Narrative:    EXAMINATION:  CTA CHEST NON CORONARY (PE STUDIES)    CLINICAL HISTORY:  Pulmonary embolism (PE) suspected, positive D-dimer;    TECHNIQUE:  Low dose axial images, sagittal and coronal reformations were obtained from the thoracic inlet to the lung bases following the IV administration of 75 mL of Omnipaque 350.  Contrast timing was optimized to evaluate the pulmonary arteries.  MIP images were performed.    COMPARISON:  None.    FINDINGS:  Examination of the soft tissue and vascular structures at the base of the neck is unremarkable.    The thoracic aorta maintains normal caliber, contour, and course without significant atherosclerotic calcification.  There is no evidence of aneurysmal dilation or dissection.    The pulmonary arteries distribute normally without evidence of filling defect to indicate pulmonary thromboembolism.    The trachea and proximal airways are patent.    The lungs are symmetrically expanded and without evidence of consolidation, pneumothorax, mass, or significant pleural thickening.  No evidence of pleural fluid.    The heart is not enlarged.  No pericardial effusion.  No abnormal bowing of the interventricular septum.    There is no axillary, mediastinal, or hilar lymph node enlargement.    The esophagus maintains a normal course and caliber.    Limited images of the upper abdomen obtained during the course of this dedicated thoracic CT is negative for acute findings.    The osseous structures are negative for acute finding or aggressive osseous lesion.                                       CT Maxillofacial Without Contrast (Final result)  Result time 07/20/24 20:59:51      Final result by Bhanu Hayes MD (07/20/24 20:59:51)                   Impression:      No CT evidence of acute intracranial abnormality.    Probable remote minimal nasal bone deformity.  Suggest correlation with point tenderness and mechanism of  injury.    Additional findings discussed in the body of the report.      Electronically signed by: Bhanu Hayes MD  Date:    07/20/2024  Time:    20:59               Narrative:    EXAMINATION:  CT HEAD WITHOUT CONTRAST; CT MAXILLOFACIAL WITHOUT CONTRAST    CLINICAL HISTORY:  Facial trauma, blunt;  No additional information is available in the chart for clinical correlation at the time of dictation.    TECHNIQUE:  CT images of the head and maxillofacial bones without contrast.  Coronal and sagittal reconstructions were created for both acquisitions.    COMPARISON:  None.    FINDINGS:  CT head:    No evidence of acute territorial infarct, hemorrhage, mass effect, or midline shift.    The ventricles are normal in size and configuration.    No extra-axial hemorrhage or mass.    No displaced calvarial fracture.    CT maxillofacial:    Minimal age-indeterminate nasal bone deformity without acute depressed fracture or associated soft tissue swelling.  Suggest correlation with point tenderness and mechanism of injury.  There is also slight deviation of the nasal septum which is likely chronic.  No acute displaced facial fracture.    Globes are symmetric.  Retrobulbar fat is preserved.    Small mucosal retention cyst in the inferior left maxillary sinus.  Otherwise, the visualized paranasal sinuses and mastoid air cells are essentially clear.                                       CT Head Without Contrast (Final result)  Result time 07/20/24 20:59:51      Final result by Bhanu Hayes MD (07/20/24 20:59:51)                   Impression:      No CT evidence of acute intracranial abnormality.    Probable remote minimal nasal bone deformity.  Suggest correlation with point tenderness and mechanism of injury.    Additional findings discussed in the body of the report.      Electronically signed by: Bhanu Hayes MD  Date:    07/20/2024  Time:    20:59               Narrative:    EXAMINATION:  CT HEAD WITHOUT CONTRAST;  CT MAXILLOFACIAL WITHOUT CONTRAST    CLINICAL HISTORY:  Facial trauma, blunt;  No additional information is available in the chart for clinical correlation at the time of dictation.    TECHNIQUE:  CT images of the head and maxillofacial bones without contrast.  Coronal and sagittal reconstructions were created for both acquisitions.    COMPARISON:  None.    FINDINGS:  CT head:    No evidence of acute territorial infarct, hemorrhage, mass effect, or midline shift.    The ventricles are normal in size and configuration.    No extra-axial hemorrhage or mass.    No displaced calvarial fracture.    CT maxillofacial:    Minimal age-indeterminate nasal bone deformity without acute depressed fracture or associated soft tissue swelling.  Suggest correlation with point tenderness and mechanism of injury.  There is also slight deviation of the nasal septum which is likely chronic.  No acute displaced facial fracture.    Globes are symmetric.  Retrobulbar fat is preserved.    Small mucosal retention cyst in the inferior left maxillary sinus.  Otherwise, the visualized paranasal sinuses and mastoid air cells are essentially clear.                                       Medications   LIDOcaine 5 % patch 2 patch (2 patches Transdermal Patch Applied 7/20/24 2150)   sodium chloride 0.9% bolus 1,000 mL 1,000 mL (0 mLs Intravenous Stopped 7/20/24 2002)   ketorolac injection 15 mg (15 mg Intravenous Given 7/20/24 1911)   orphenadrine injection 30 mg (30 mg Intravenous Given 7/20/24 1912)   sodium chloride 0.9% bolus 1,000 mL 1,000 mL (0 mLs Intravenous Stopped 7/20/24 2126)   iohexoL (OMNIPAQUE 350) injection 75 mL (75 mLs Intravenous Given 7/20/24 2049)     Medical Decision Making  Urgent evaluation of an afebrile 39-year-old female presenting after witnessed syncopal episode. Patient alert and oriented appropriately, but appears lethargic and only opens eyes to speech. Patient is hypotensive upon arrival at 95/52, however, reports  baseline hypotension. Lungs clear to auscultation bilaterally, heart sounds normal without tachycardia. Plan for cardiac workup, basic labs, UA, tox screen. Will obtain imaging, treat pain, and monitor closely.    Differential Diagnosis includes, but is not limited to:  Vasovagal episode, orthostatic hypotension, anemia, arrhythmia, PE, dehydration, intoxication, concussion, ICH    Amount and/or Complexity of Data Reviewed  Labs: ordered. Decision-making details documented in ED Course.  Radiology: ordered. Decision-making details documented in ED Course.  ECG/medicine tests: ordered and independent interpretation performed.     Details: Normal sinus rhythm at rate of 65 beats per minute.  Normal axis.  Narrow QRS.  No ST elevation.  No STEMI.  No prior EKG for comparison.    Risk  Prescription drug management.               ED Course as of 07/20/24 2200   Sat Jul 20, 2024 1911 WBC: 9.93 [MADISON]   1946 Troponin I: <0.006 [MADISON]   1951 CMP without electrolyte abnormality or renal insufficiency [[facility]]   2023 hCG Qualitative, Urine: Negative [MADISON]   2023 UA without evidence of UTI [MADISON]   2035 Cocaine, Urine(!): Presumptive Positive [MAD[facility]]   2035 Marijuana (THC) Metabolite(!): Presumptive Positive [MADISON]   2035 Patient provides additional history that she used cocaine yesterday which she does not usually do. [MADISON]   2103 CT Maxillofacial Without Contrast  No CT evidence of acute intracranial abnormality.     Probable remote minimal nasal bone deformity.  Suggest correlation with point tenderness and mechanism of injury. [MADISON]   2121 CTA Chest Non-Coronary (PE Studies)  No acute abnormality of the chest. Specifically, no evidence of pulmonary thromboembolism.    [MADISON]   2138 Patient now opening her eyes spontaneously and sitting up. She appears improved and states she is feeling better, though still has a headache. I suspect patient likely has a concussion given her mechanism of injury and presenting symptoms. She was educated on  brain rest and will refer to outpatient concussion clinic. She will be discharged to her partner's house where she can be monitored tonight. Patient states that she is ready to go home and just wants to go to sleep.  Instructed to increase water intake as well as salt especially over the next few days given her low blood pressure to avoid further syncopal episodes. Patient educated on signs and symptoms to monitor for and when to return to ED. Patient verbalized understanding agrees with treatment plan. All questions and concerns addressed. [[facility]]      ED Course User Index  [MAD[facility]] Marcia Keller PA-C                     Clinical Impression:  Final diagnoses:  [R55] Syncope (Primary)  [W19.XXXA] Fall  [R07.81] Rib pain on right side  [S09.90XA] Closed head injury, initial encounter          ED Disposition Condition    Discharge Stable          ED Prescriptions       Medication Sig Dispense Start Date End Date Auth. Provider    naproxen (NAPROSYN) 500 MG tablet Take 1 tablet (500 mg total) by mouth 2 (two) times daily with meals. 20 tablet 7/20/2024 -- Marcia Keller PA-C    methocarbamoL (ROBAXIN) 500 MG Tab Take 1 tablet (500 mg total) by mouth 3 (three) times daily. for 7 days 20 tablet 7/20/2024 7/27/2024 Marcia Keller PA-C    LIDOcaine (LIDODERM) 5 % Place 1 patch onto the skin once daily. Remove & Discard patch within 12 hours or as directed by MD Akhtar patch 7/20/2024 -- Marcia Keller PA-C    ondansetron (ZOFRAN-ODT) 4 MG TbDL Take 1 tablet (4 mg total) by mouth every 8 (eight) hours as needed (for nausea). 30 tablet 7/20/2024 -- Marcia Keller PA-C          Follow-up Information       Follow up With Specialties Details Why Contact Info [facility] -  Schedule an appointment as soon as possible for a visit   [address]  [place] [address]  [phone]      [facility] - Emergency Dept Emergency Medicine Go to  If symptoms worsen [address]  [place]  [place] [address]  [phone]             Marcia Keller PA-C  07/20/24 2225

## 2024-07-23 ENCOUNTER — TELEPHONE (OUTPATIENT)
Dept: NEUROLOGY | Facility: CLINIC | Age: 39
End: 2024-07-23
Payer: COMMERCIAL

## 2024-07-23 ENCOUNTER — OFFICE VISIT (OUTPATIENT)
Dept: NEUROLOGY | Facility: CLINIC | Age: 39
End: 2024-07-23
Payer: COMMERCIAL

## 2024-07-23 VITALS — HEART RATE: 75 BPM | DIASTOLIC BLOOD PRESSURE: 57 MMHG | SYSTOLIC BLOOD PRESSURE: 111 MMHG

## 2024-07-23 DIAGNOSIS — M54.81 OCCIPITAL NEURITIS: ICD-10-CM

## 2024-07-23 DIAGNOSIS — R55 VASOVAGAL SYNCOPE: ICD-10-CM

## 2024-07-23 DIAGNOSIS — H55.81 SACCADIC DEFICIENCY: ICD-10-CM

## 2024-07-23 DIAGNOSIS — R53.83 FATIGUE DUE TO SLEEP PATTERN DISTURBANCE: ICD-10-CM

## 2024-07-23 DIAGNOSIS — S06.0X1A CONCUSSION WITH LOSS OF CONSCIOUSNESS OF 30 MINUTES OR LESS, INITIAL ENCOUNTER: Primary | ICD-10-CM

## 2024-07-23 DIAGNOSIS — G47.9 FATIGUE DUE TO SLEEP PATTERN DISTURBANCE: ICD-10-CM

## 2024-07-23 DIAGNOSIS — H51.11 CONVERGENCE INSUFFICIENCY: ICD-10-CM

## 2024-07-23 DIAGNOSIS — S13.4XXA WHIPLASH INJURY TO NECK, INITIAL ENCOUNTER: ICD-10-CM

## 2024-07-23 DIAGNOSIS — R26.89 IMBALANCE: ICD-10-CM

## 2024-07-23 DIAGNOSIS — R41.89 COGNITIVE CHANGES: ICD-10-CM

## 2024-07-23 DIAGNOSIS — M54.2 CERVICALGIA OF OCCIPITO-ATLANTO-AXIAL REGION: ICD-10-CM

## 2024-07-23 DIAGNOSIS — F34.89 OTHER SPECIFIED PERSISTENT MOOD DISORDERS: ICD-10-CM

## 2024-07-23 PROCEDURE — 99205 OFFICE O/P NEW HI 60 MIN: CPT | Mod: S$GLB,,, | Performed by: PSYCHIATRY & NEUROLOGY

## 2024-07-23 PROCEDURE — 99999 PR PBB SHADOW E&M-EST. PATIENT-LVL IV: CPT | Mod: PBBFAC,,, | Performed by: PSYCHIATRY & NEUROLOGY

## 2024-07-23 RX ORDER — MELOXICAM 7.5 MG/1
7.5 TABLET ORAL DAILY
Qty: 30 TABLET | Refills: 3 | Status: SHIPPED | OUTPATIENT
Start: 2024-07-23

## 2024-07-23 NOTE — TELEPHONE ENCOUNTER
Spoke to Pt about her appt today. Pt denies her injury being workers comp related. Pt was advised to arrive early and informed about the 15 min batsheva period.

## 2024-07-23 NOTE — PROGRESS NOTES
Chief Complaint: No chief complaint on file.    Subjective:     History of Present Illness    Referring Provider:  Date of Injury: 07/20/2024  Accompanied by: Moy, boyfriend    07/23/2024: Elizabeth Frankel is a 39 y.o. female with n/o anxiety, b12 deficiency. In the summer of 2014 (possibly...roughly 10 years ago), the patient states that she was working as a  in a liquor store when she noticed that she had some abdominal pain and is unsure if she had some emesis. Endorses that she was  sweating with possible SOB prior to her syncopal event when she fell to floor with her head coming to contact with the hard floor of the store. She does not know if she was unconscious prior to her head hitting the fall. She did not sustain any superficial injury. She is unsure if she gain consciousness in the ambulance or prior to EMS arrival. Immediately, she thinks she might had had a headache. She was admitted in the hospital in [place] and states she think they performed a echo, but is unclear. She states that they concluded that she had b12 def. and thought that to be the etiology. On 7/20/24, she does recall the entirety of the day but states that she slept in the morning. She has been having an issue with her stomach and was recently diagnosed with a stomach ulcer. She was at a restaurant and shortly after she finished  her breakfast she felt nausea and wanted to leave the table. Per boyfriend, she was complaining stomach issue and she proceeded to head to car. She hit head on the wooden banister possible on the R-side of her head and loss consciousness. She remembers her name being called but felt drowsy and wanted to sleep. She was asked if she would like to go to the hospital and in the ambulance but she refused. She proceed to step up but does not recall getting off the floor and walking to the door. Before she could get to the door, she collapse again and hit her face first on a floor mat ground with LOC.  She states when she Immediately, she felt pain in R temple  burning headache radiating to R eye, nauseous, vomited x1, dizziness, photophobia and unsure if she had blurred vision. She sustained  injuries of R temporal brush burn, R eye swollen with no bruising, R rib cage swollen but no bruise and R side jaw pain with opening and no bruising. Currently, she endorses a constant throbbing headache R temple radiating to R eye with associated nausea, photophobia, phonophobia possibly but she is unsure since she has been in a quiet area since the event. The current headache has woken her up from slumber and she has woken up with the headache. Unsure if they are exacerbated by vagal maneuver because anytime she coughs or sneezes her ribs hurt.  She has a history of migraine with associated nausea and photophobia that are associated with her menstruation, lasting less than 4 hrs after taking sumatriptan or tylenol depending on the severity.  Currently, endorses intermittent dizziness upon standing and this has happened in the past multiple times before she states that she is typically hypotensive. Currently, endorses R- sided intermittent neck pain that is triggered by neck movement. Sleep has been fine but she wakes up tired and all she wants to do is sleep. Per boyfriend, she has been really tired. Mood has not been great. She has been irritable. Denies emotional liability, anxiety or depression or SI or HI. She keeps forgetting certain things. Endorses that she has been experience RLE intermitted tingling, which is new since the event. Endorses generalized weakness.  Per boyfriend and patient, they do not recall an instance when she does not respond to external stimuli. Endorses that since the event that she has woken up in the middle of the night thinking that her entire body was shaking to due palpation and she was full aware; denies tongue biting or incontinence.    Patient has tried Ketorolac, orphenadrine citrate,  lidocaine patch, naproxen, zofran (prescribed but not taken yet), methocarbamol, ice    Current Outpatient Medications on File Prior to Visit   Medication Sig Dispense Refill    cyanocobalamin 1,000 mcg/mL injection INJECT 1,000 MCG AS DIRECTED EVERY 14 (FOURTEEN) DAYS. 6 mL 2    FLUoxetine 20 MG capsule Take 1 capsule (20 mg total) by mouth once daily. 90 capsule 1    hydrOXYzine HCL (ATARAX) 25 MG tablet Take 2 tablets (50 mg total) by mouth 3 (three) times daily as needed for Anxiety. 60 tablet 5    LIDOcaine (LIDODERM) 5 % Place 1 patch onto the skin once daily. Remove & Discard patch within 12 hours or as directed by MD Akhtar patch 0    methocarbamoL (ROBAXIN) 500 MG Tab Take 1 tablet (500 mg total) by mouth 3 (three) times daily. for 7 days 20 tablet 0    naproxen (NAPROSYN) 500 MG tablet Take 1 tablet (500 mg total) by mouth 2 (two) times daily with meals. 20 tablet 0    ondansetron (ZOFRAN-ODT) 4 MG TbDL Take 1 tablet (4 mg total) by mouth every 8 (eight) hours as needed (for nausea). 30 tablet 0    pantoprazole (PROTONIX) 40 MG tablet Take 1 tablet (40 mg total) by mouth 2 (two) times daily. 60 tablet 1    sumatriptan (IMITREX) 50 MG tablet Take 1 tablet (50 mg total) by mouth as needed for Migraine (Take no more than 4 tablets in 24H). 15 tablet 1     No current facility-administered medications on file prior to visit.       Review of patient's allergies indicates:  No Known Allergies    Family History   Problem Relation Name Age of Onset    Cancer Mother Idalmis         HPV anal cancer    Depression Mother Idalmis     Macular degeneration Mother Idalmis     Early death Mother Idalmis     Asthma Father Randy Frankel     No Known Problems Sister half     No Known Problems Sister half     Lung cancer Maternal Grandmother Tahmina     Breast cancer Maternal Grandmother Tahmina 40 - 49    Cancer Maternal Grandmother Tahmina     No Known Problems Maternal Grandfather      No Known Problems Paternal Grandmother      No Known  Problems Paternal Grandfather         Social History     Tobacco Use    Smoking status: Former     Current packs/day: 0.50     Average packs/day: 0.5 packs/day for 7.0 years (3.5 ttl pk-yrs)     Types: Cigarettes     Passive exposure: Never    Smokeless tobacco: Never    Tobacco comments:     Quit 12 years ago   Substance Use Topics    Alcohol use: Yes     Alcohol/week: 5.0 standard drinks of alcohol     Types: 5 Glasses of wine per week     Comment: socially    Drug use: Yes     Types: Marijuana     Comment: Rarely       Review of Systems  Constitutional: No fevers, no chills, no change in weight  Eye/Vision: See HPI  Ear/Nose/Mouth/Throat: See HPI; no cough, no runny nose, no sore throat  Respiratory: endorses shortness of breath but believes it is because of her ribs, endorses problems breathing but believes it is because of her ribs  Cardiovascular: No chest pain  Gastrointestinal: See HPI, no diarrhea, endorses constipation but believes it is because of her ribs  Genitourinary: No dysuria  Musculoskeletal: See HPI  Integumentary: No skin changes  Neurologic: See HPI  Psychiatric: Denies depression, denies anxiety, denies SI and HI.    Objective:     Vitals:    07/23/24 1409   BP: (!) 111/57   Pulse: 75       General: Alert and awake, Well nourished, Well groomed, No acute distress, photophobia with 60 Hz hypersensitivity.  Eyes: Pupils are equal, round and reactive to light; Extraocular movements are intact; Normal conjunctiva; no nystagmus; Visual fields are intact bilaterally in all cardinal directions; Head thrust negative bilaterally. VOR cancellation WNL  HENT: Normocephalic, Rinne test positive bilaterally, Oral mucosa is moist, No pharyngeal erythema.  Neck: Supple  No Stiffness  Patient has occipital point tenderness over the bilateral greater and R lesser occipital nerve with induction of headaches with jump sign and twitch response and referred pain to behind the R eye and R temporal: 1+   No high,  "medial cervical pain with lateral movement of C1 over C2 and with isometric neck flexion and extension  Fluid patient turnaround with concurrent neck movement in direction of torso movement.  Bilateral paraspinal cervical muscle spasm present  Cardiovascular: Normal rate, Regular rhythm, No murmur, No edema; no carotid bruits noted.  Musculoskeletal: No swelling.  Spine/torso exam: Spine/ torso exam is within normal limits   Integumentary: Warm, Dry, Intact, No pallor, No rash.    Neurologic Exam  Mental Status: orientated to time, person, and place; good recent and remote memory; attention and concentration WNL; naming intact; adequate fund of knowledge. No aphasia or dysarthria. Repetition intact. Follows complex commands    Cranial Nerves: as above, V1-V3 temperature sensation WNL bilaterally, face symmetric, symmetrical palatal rise, SCM 5/5 bilaterally, tongue protrusion midline and movements WNL  saccadic intrusions of volitional ocular smooth pursuits  saccadic dysmetria  pain with sustained upgaze and convergence  visual motion sensitivity/dizziness produced with rapid eye movements or neck movements  non-lateralizing Del Castillo tuning fork exam  convergence insufficiency with diplopia developed > 5 " accommodation    Muscle Tone/Motor Function: Normal bulk and tone throughout. No drift. Normal rapidly alternating movements. No tremors. No abnormal movements                                                                                                          Right                   Left                                  Deltoid          5/5                      5/5                                  Biceps          5/5                      5/5                                  Triceps         5/5                      5/5                                  Iliopsoas       5/5                     5/5                                  Quadriceps   5/5                     5/5                                  Hamstring    "  5/5                     5/5                                  Dorsiflexion   5/5                     5/5    Sensory: Vibration sensation WNL x4, Temperature sensation WNL x4, Negative Romberg, unsteady tandem stance    Reflexes: Symmetrical DTR's, Biceps 2+, Brachioradialis 2+, Patellar 2+, No Wartenberg or Lhermitte    Coordination: No truncal ataxia. Finger to nose WNL bilaterally    Gait: Gait WNL, Heel to toe walking WNL    Labs:  Admission on 07/20/2024, Discharged on 07/20/2024   Component Date Value Ref Range Status    D-Dimer 07/20/2024 0.57 (H)  <0.50 mg/L FEU Final    Comment: The quantitative D-dimer assay should be used as an aid in   the diagnosis of deep vein thrombosis and pulmonary embolism  in patients with the appropriate presentation and clinical  history. The upper limit of the reference interval and the clinical   cut off   point are identical. Causes of a positive (>0.50 mg/L FEU) D-Dimer   test  include, but are not limited to: DVT, PE, DIC, thrombolytic   therapy, anticoagulant therapy, recent surgery, trauma, or   pregnancy, disseminated malignancy, aortic aneurysm, cirrhosis,  and severe infection. False negative results may occur in   patients with distal DVT.  ANDRE^612^^41^  LOT^610^[id number]\DDiBuf^[id number]\[id number]      POC Preg Test, Ur 07/20/2024 Negative  Negative Final     Acceptable 07/20/2024 Yes   Final    WBC 07/20/2024 9.93  3.90 - 12.70 K/uL Final    RBC 07/20/2024 3.51 (L)  4.00 - 5.40 M/uL Final    Hemoglobin 07/20/2024 11.0 (L)  12.0 - 16.0 g/dL Final    Hematocrit 07/20/2024 32.3 (L)  37.0 - 48.5 % Final    MCV 07/20/2024 92  82 - 98 fL Final    MCH 07/20/2024 31.3 (H)  27.0 - 31.0 pg Final    MCHC 07/20/2024 34.1  32.0 - 36.0 g/dL Final    RDW 07/20/2024 12.2  11.5 - 14.5 % Final    Platelets 07/20/2024 215  150 - 450 K/uL Final    MPV 07/20/2024 10.4  9.2 - 12.9 fL Final    Immature Granulocytes 07/20/2024 0.4  0.0 - 0.5 % Final    Gran # (ANC)  07/20/2024 8.1 (H)  1.8 - 7.7 K/uL Final    Immature Grans (Abs) 07/20/2024 0.04  0.00 - 0.04 K/uL Final    Comment: Mild elevation in immature granulocytes is non specific and   can be seen in a variety of conditions including stress response,   acute inflammation, trauma and pregnancy. Correlation with other   laboratory and clinical findings is essential.      Lymph # 07/20/2024 1.2  1.0 - 4.8 K/uL Final    Mono # 07/20/2024 0.6  0.3 - 1.0 K/uL Final    Eos # 07/20/2024 0.0  0.0 - 0.5 K/uL Final    Baso # 07/20/2024 0.02  0.00 - 0.20 K/uL Final    nRBC 07/20/2024 0  0 /100 WBC Final    Gran % 07/20/2024 81.7 (H)  38.0 - 73.0 % Final    Lymph % 07/20/2024 11.9 (L)  18.0 - 48.0 % Final    Mono % 07/20/2024 5.5  4.0 - 15.0 % Final    Eosinophil % 07/20/2024 0.3  0.0 - 8.0 % Final    Basophil % 07/20/2024 0.2  0.0 - 1.9 % Final    Differential Method 07/20/2024 Automated   Final    Sodium 07/20/2024 140  136 - 145 mmol/L Final    Potassium 07/20/2024 3.6  3.5 - 5.1 mmol/L Final    Specimen slightly hemolyzed    Chloride 07/20/2024 108  95 - 110 mmol/L Final    CO2 07/20/2024 21 (L)  23 - 29 mmol/L Final    Glucose 07/20/2024 106  70 - 110 mg/dL Final    BUN 07/20/2024 9  6 - 20 mg/dL Final    Creatinine 07/20/2024 0.9  0.5 - 1.4 mg/dL Final    Calcium 07/20/2024 9.2  8.7 - 10.5 mg/dL Final    Total Protein 07/20/2024 6.1  6.0 - 8.4 g/dL Final    Albumin 07/20/2024 3.9  3.5 - 5.2 g/dL Final    Total Bilirubin 07/20/2024 0.6  0.1 - 1.0 mg/dL Final    Comment: For infants and newborns, interpretation of results should be based  on gestational age, weight and in agreement with clinical  observations.    Premature Infant recommended reference ranges:  Up to 24 hours.............<8.0 mg/dL  Up to 48 hours............<12.0 mg/dL  3-5 days..................<15.0 mg/dL  6-29 days.................<15.0 mg/dL      Alkaline Phosphatase 07/20/2024 40 (L)  55 - 135 U/L Final    AST 07/20/2024 20  10 - 40 U/L Final    ALT  07/20/2024 14  10 - 44 U/L Final    eGFR 07/20/2024 >60  >60 mL/min/1.73 m^2 Final    Anion Gap 07/20/2024 11  8 - 16 mmol/L Final    QRS Duration 07/20/2024 86  ms Final    OHS QTC Calculation 07/20/2024 465  ms Final    Troponin I 07/20/2024 <0.006  0.000 - 0.026 ng/mL Final    Comment: The reference interval for Troponin I represents the 99th percentile   cutoff   for our facility and is consistent with 3rd generation assay   performance.      Specimen UA 07/20/2024 Urine, Clean Catch   Final    Color, UA 07/20/2024 Yellow  Yellow, Straw, Angelina Final    Appearance, UA 07/20/2024 Hazy (A)  Clear Final    pH, UA 07/20/2024 6.0  5.0 - 8.0 Final    Specific Gravity, UA 07/20/2024 >=1.030 (A)  1.005 - 1.030 Final    Protein, UA 07/20/2024 Trace (A)  Negative Final    Comment: Recommend a 24 hour urine protein or a urine   protein/creatinine ratio if globulin induced proteinuria is  clinically suspected.      Glucose, UA 07/20/2024 Negative  Negative Final    Ketones, UA 07/20/2024 Trace (A)  Negative Final    Bilirubin (UA) 07/20/2024 1+ (A)  Negative Final    Comment: Positive urine bilirubin is not confirmed. Correlate with   serum bilirubin and clinical presentation.      Occult Blood UA 07/20/2024 Negative  Negative Final    Nitrite, UA 07/20/2024 Negative  Negative Final    Urobilinogen, UA 07/20/2024 Negative  <2.0 EU/dL Final    Leukocytes, UA 07/20/2024 Negative  Negative Final    Benzodiazepines 07/20/2024 Negative  Negatiive Final    Methadone metabolites 07/20/2024 Negative  Negative Final    Cocaine (Metab.) 07/20/2024 Presumptive Positive (A)  Negative Final    Opiate Scrn, Ur 07/20/2024 Negative  Negative Final    Amphetamine Screen, Ur 07/20/2024 Negative  Negative Final    THC 07/20/2024 Presumptive Positive (A)  Negative Final    Phencyclidine 07/20/2024 Negative  Negative Final    Creatinine, Urine 07/20/2024 347.6 (H)  15.0 - 325.0 mg/dL Final    Toxicology Information 07/20/2024 SEE COMMENT   Final     Comment: This screen includes the following classes of drugs at the listed   cut-off:    Benzodiazepines 200 ng/ml  Methadone 300 ng/ml  Cocaine metabolite 300 ng/ml  Opiates 300 ng/ml  Barbiturates 200 ng/ml  Amphetamines 1000 ng/ml  Marijuana metabs (THC) 50 ng/ml  Phencyclidine (PCP) 25 ng/ml    This is a screening test. If results do not correlate with clinical   presentation, then a confirmatory send out test is advised.     This report is intended for use in clinical monitoring and management   of   patients. It is not intended for use in employment related drug   testing.     Office Visit on 06/13/2024   Component Date Value Ref Range Status    SARS Coronavirus 2 Antigen 06/13/2024 Negative  Negative Final     Acceptable 06/13/2024 Yes   Final    Molecular Strep A, POC 06/13/2024 Negative  Negative Final     Acceptable 06/13/2024 Yes   Final   Lab Visit on 06/10/2024   Component Date Value Ref Range Status    H. Pylori Antigen, Stool 06/10/2024 NOT DETECTED   Final    Comment: Reference Range:  NOT DETECTED    Antimicrobials, proton pump inhibitors, and bismuth preparations  inhibit H. pylori and ingestion up to two weeks prior to testing may  cause false negative results. If clinically indicated the test should  be repeated on a new specimen obtained two weeks after discontinuing  treatment.  Test Performed at:  [facility] [address]  [address]     HAIR Hernandez MD, PhD, JOVON      Occult Blood 06/10/2024 Negative  Negative Final    Stool WBC 06/10/2024 No neutrophils seen  No neutrophils seen Final    Stool Culture 06/10/2024 No enteric kalina   Final    Giardia Antigen - EIA 06/10/2024 Negative  Negative Final    Cryptosporidium Antigen 06/10/2024 Negative  Negative Final    Stool Exam-Ova,Cysts,Parasites 06/10/2024 FINAL 06/15/2024 1332   Final    Comment: SOURCE: STOOL, STLP  OVA AND PARASITE, MICROSCOPY, F                         FINAL    No parasites seen.   Cryptosporidium, Cyclospora, and microsporidia are not   readily detected by this method.   Single negative specimen does not rule out   parasitic infection.    Test Performed by:  [facility] - [address] [address]  : Vihn Roberto Ph.D.; CLIA# [id number]      Rotavirus 06/10/2024 Negative  Negative Final    Shiga Toxin 1 E.coli 06/10/2024 Negative   Final    Shiga Toxin 2 E.coli 06/10/2024 Negative   Final   Lab Visit on 06/06/2024   Component Date Value Ref Range Status    Hepatitis C Ab 06/06/2024 Non-reactive  Non-reactive Final    Magnesium 06/06/2024 2.3  1.6 - 2.6 mg/dL Final    WBC 06/06/2024 4.76  3.90 - 12.70 K/uL Final    RBC 06/06/2024 4.11  4.00 - 5.40 M/uL Final    Hemoglobin 06/06/2024 13.0  12.0 - 16.0 g/dL Final    Hematocrit 06/06/2024 39.7  37.0 - 48.5 % Final    MCV 06/06/2024 97  82 - 98 fL Final    MCH 06/06/2024 31.6 (H)  27.0 - 31.0 pg Final    MCHC 06/06/2024 32.7  32.0 - 36.0 g/dL Final    RDW 06/06/2024 12.6  11.5 - 14.5 % Final    Platelets 06/06/2024 248  150 - 450 K/uL Final    MPV 06/06/2024 11.4  9.2 - 12.9 fL Final    Immature Granulocytes 06/06/2024 0.2  0.0 - 0.5 % Final    Gran # (ANC) 06/06/2024 2.8  1.8 - 7.7 K/uL Final    Immature Grans (Abs) 06/06/2024 0.01  0.00 - 0.04 K/uL Final    Comment: Mild elevation in immature granulocytes is non specific and   can be seen in a variety of conditions including stress response,   acute inflammation, trauma and pregnancy. Correlation with other   laboratory and clinical findings is essential.      Lymph # 06/06/2024 1.4  1.0 - 4.8 K/uL Final    Mono # 06/06/2024 0.4  0.3 - 1.0 K/uL Final    Eos # 06/06/2024 0.1  0.0 - 0.5 K/uL Final    Baso # 06/06/2024 0.03  0.00 - 0.20 K/uL Final    nRBC 06/06/2024 0  0 /100 WBC Final    Gran % 06/06/2024 59.4  38.0 - 73.0 % Final    Lymph % 06/06/2024 30.3  18.0 - 48.0 % Final    Mono %  06/06/2024 8.4  4.0 - 15.0 % Final    Eosinophil % 06/06/2024 1.1  0.0 - 8.0 % Final    Basophil % 06/06/2024 0.6  0.0 - 1.9 % Final    Differential Method 06/06/2024 Automated   Final    Sodium 06/06/2024 138  136 - 145 mmol/L Final    Potassium 06/06/2024 4.2  3.5 - 5.1 mmol/L Final    Chloride 06/06/2024 108  95 - 110 mmol/L Final    CO2 06/06/2024 22 (L)  23 - 29 mmol/L Final    Glucose 06/06/2024 85  70 - 110 mg/dL Final    BUN 06/06/2024 7  6 - 20 mg/dL Final    Creatinine 06/06/2024 0.8  0.5 - 1.4 mg/dL Final    Calcium 06/06/2024 10.0  8.7 - 10.5 mg/dL Final    Total Protein 06/06/2024 7.2  6.0 - 8.4 g/dL Final    Albumin 06/06/2024 4.4  3.5 - 5.2 g/dL Final    Total Bilirubin 06/06/2024 0.5  0.1 - 1.0 mg/dL Final    Comment: For infants and newborns, interpretation of results should be based  on gestational age, weight and in agreement with clinical  observations.    Premature Infant recommended reference ranges:  Up to 24 hours.............<8.0 mg/dL  Up to 48 hours............<12.0 mg/dL  3-5 days..................<15.0 mg/dL  6-29 days.................<15.0 mg/dL      Alkaline Phosphatase 06/06/2024 45 (L)  55 - 135 U/L Final    AST 06/06/2024 15  10 - 40 U/L Final    ALT 06/06/2024 8 (L)  10 - 44 U/L Final    eGFR 06/06/2024 >60.0  >60 mL/min/1.73 m^2 Final    Anion Gap 06/06/2024 8  8 - 16 mmol/L Final    Ferritin 06/06/2024 28  20.0 - 300.0 ng/mL Final    Iron 06/06/2024 90  30 - 160 ug/dL Final    Transferrin 06/06/2024 266  200 - 375 mg/dL Final    TIBC 06/06/2024 394  250 - 450 ug/dL Final    Saturated Iron 06/06/2024 23  20 - 50 % Final    Vitamin B6 06/06/2024 14  5 - 50 ug/L Final    Comment: This test was developed and the performance   characteristics determined by [facility].   It has not been cleared or approved by the FDA.   The laboratory is regulated under CLIA as qualified to   perform high-complexity testing. This test is used for   patient testing purposes. It should not  be regarded   as investigational or for research.    Test performed at [facility],  [address] , [place]  [address]     [phone]  Diane Shi MD, PhD - Medical Director      Thiamine 06/06/2024 57  38 - 122 ug/L Final    Comment: This test was developed and the performance   characteristics determined by [facility].   It has not been cleared or approved by the FDA.   The laboratory is regulated under CLIA as qualified to   perform high-complexity testing. This test is used for   patient testing purposes. It should not be regarded   as investigational or for research.    Test performed at [facility],  [address] , [place]  [address]     [phone]  Diane Shi MD, PhD - Medical Director      Folate 06/06/2024 6.8  4.0 - 24.0 ng/mL Final    Vitamin B-12 06/06/2024 359  210 - 950 pg/mL Final    Vit D, 25-Hydroxy 06/06/2024 36  30 - 96 ng/mL Final    Comment: Vitamin D deficiency.........<10 ng/mL                              Vitamin D insufficiency......10-29 ng/mL       Vitamin D sufficiency........> or equal to 30 ng/mL  Vitamin D toxicity............>100 ng/mL      Cholesterol 06/06/2024 201 (H)  120 - 199 mg/dL Final    Comment: The National Cholesterol Education Program (NCEP) has set the  following guidelines (reference ranges) for Cholesterol:  Optimal.....................<200 mg/dL  Borderline High.............200-239 mg/dL  High........................> or = 240 mg/dL      Triglycerides 06/06/2024 80  30 - 150 mg/dL Final    Comment: The National Cholesterol Education Program (NCEP) has set the  following guidelines (reference values) for triglycerides:  Normal......................<150 mg/dL  Borderline High.............150-199 mg/dL  High........................200-499 mg/dL      HDL 06/06/2024 63  40 - 75 mg/dL Final    Comment: The National Cholesterol Education Program (NCEP) has set the  following guidelines (reference values)  for HDL Cholesterol:  Low...............<40 mg/dL  Optimal...........>60 mg/dL      LDL Cholesterol 06/06/2024 122.0  63.0 - 159.0 mg/dL Final    Comment: The National Cholesterol Education Program (NCEP) has set the  following guidelines (reference values) for LDL Cholesterol:  Optimal.......................<130 mg/dL  Borderline High...............130-159 mg/dL  High..........................160-189 mg/dL  Very High.....................>190 mg/dL      HDL/Cholesterol Ratio 06/06/2024 31.3  20.0 - 50.0 % Final    Total Cholesterol/HDL Ratio 06/06/2024 3.2  2.0 - 5.0 Final    Non-HDL Cholesterol 06/06/2024 138  mg/dL Final    Comment: Risk category and Non-HDL cholesterol goals:  Coronary heart disease (CHD)or equivalent (10-year risk of CHD >20%):  Non-HDL cholesterol goal     <130 mg/dL  Two or more CHD risk factors and 10-year risk of CHD <= 20%:  Non-HDL cholesterol goal     <160 mg/dL  0 to 1 CHD risk factor:  Non-HDL cholesterol goal     <190 mg/dL        Imaging:    CT Head w/o contrast on 7/20/2024:  FINDINGS:  CT head:  No evidence of acute territorial infarct, hemorrhage, mass effect, or midline shift.  The ventricles are normal in size and configuration.  No extra-axial hemorrhage or mass.  No displaced calvarial fracture.     My read: No acute intracranial pathology      CT maxillofacial w/o contrast on 7/20/2024:  CT maxillofacial:     Minimal age-indeterminate nasal bone deformity without acute depressed fracture or associated soft tissue swelling.  Suggest correlation with point tenderness and mechanism of injury.  There is also slight deviation of the nasal septum which is likely chronic.  No acute displaced facial fracture.  Globes are symmetric.  Retrobulbar fat is preserved.  Small mucosal retention cyst in the inferior left maxillary sinus.  Otherwise, the visualized paranasal sinuses and mastoid air cells are essentially clear.    Impression:  No CT evidence of acute intracranial  abnormality.  Probable remote minimal nasal bone deformity.  Suggest correlation with point tenderness and mechanism of injury.  Additional findings discussed in the body of the report.    My read: No acute pathology; no acute fractures    I personally reviewed all diagnostic images and reports and agree with findings in the radiographical report as noted below unless otherwise specified.        ICD-10-CM ICD-9-CM    1. Head injury, acute, with loss of consciousness, initial encounter  S06.9X9A 854.06       2. Syncope  R55 780.2 Ambulatory referral/consult to Concussion Management Program      Ambulatory referral/consult to Cardiology      3. Fall  W19.XXXA E888.9 Ambulatory referral/consult to Concussion Management Program      4. Imbalance  R26.89 781.2 Ambulatory referral/consult to Physical/Occupational Therapy      5. Cervico-occipital neuralgia  M54.81 723.8       6. Occipital neuritis  M54.81 723.8 Ambulatory referral/consult to Physical/Occupational Therapy      7. Cognitive changes  R41.89 799.59       8. Other specified persistent mood disorders  F34.89 296.99       9. Cervicalgia of kiavqprs-xnkobdp-dchlm region  M54.2 723.1       10. Saccadic deficiency  H55.81 379.57       11. Convergence insufficiency  H51.11 378.83       12. Fatigue due to sleep pattern disturbance  R53.83 780.79     G47.9 780.50       13. Concussion with loss of consciousness of 30 minutes or less, initial encounter  S06.0X1A 850.11 Ambulatory referral/consult to Physical/Occupational Therapy      Ambulatory referral/consult to Cardiology           Elizabeth Frankel is a 39 y.o. female with n/o anxiety, b12 deficiency, central serous retinopathy presents to the clinic for concussion evaluation. On exam, has occipital point tenderness over the bilateral greater and R lesser occipital nerve with induction of headaches with jump sign and twitch response and referred pain to behind the R eye and R temporal, imbalance, saccadic intrusion,  saccadic dysmetria, convergence insuffiencey. We discussed that there is currently no universally accepted definition of concussion. We discussed that concussion is a traumatic brain injury. We discussed that concussion can occur as a result of an impact to the head or to the body. We discussed that our clinic considers concussion definitive if there is transient disruption of brain activity such as with loss of consciousness, amnesia as it relates to the day of the injury, or reports of neurological dysfunction on the day of injury. We discussed typical course, signs & symptoms, diagnostic findings, and treatment for concussion. We discussed that whiplash injury is a neck injury that can occur at a lower impact speed or force than concussion. We discussed that whiplash symptoms can be the same as concussion symptoms. We discussed typical course, signs & symptoms, diagnostic findings, and treatment for whiplash. Patient's exam and symptoms are the result of a kinetic force injury with resultant cranio cervical trauma and occipital neuritis. We discussed at length about the anatomy, symptomology, etiologies, and exam findings of occipital neuritis. We discussed the risks vs benefits of waiting vs acute therapy for occipital neuritis in that there is a risk of waiting for treatment in that permanent nerve damage could result as the nerve is chronically scarred into the muscle but there is no way to predict whether damage has already occurred until we start the treatment plan as described below. We discussed that head and/or neck injury can result in pain as well as cognitive, mood, and sleep disruption. We discussed that pain disturbances can disrupt sleep, cognition, and mood. We discussed that sleep disturbances can disrupt cognition, mood, and worsen pain. We discussed that mood disturbances can disrupt sleep, cognition, and worsen pain.We discussed at length about the anatomy, symptomology, etiologies, and exam  findings of occipital neuritis.We spoke about how she has a diagnosis of central serous retinopathy and is unable to do steroid, so we have prescribed meloxicam. Patient tested positive for marijuana and cocaine on the day of the event. Patient states that she does not use cocaine on a regular basis but does indulge on marijuana gummies from time to time. Patient was asked if they would like to be referred to counseling or seek treatment for drug cessation. Based on the patient's examination and history of the event, you did sustain a concussion on 7/20/2024 as a result of at least one of the two head injury. However, it is warranted that you have a evaluation from a cardiology for possibly syncopal etiologies. Counseled on how cocaine use can transiently effect blood pressure.    Plan:     Referral to Cardio for syncopal event for a possible Holter monitoring  start meloxicam 7.5 mg daily until you return to see us in clinic. Take meloxicam with food.  Discussed ergonomic accommodations for occipital neuritis/neuralgia. Mainly perform all work at eye level to minimize continued neck flexion which will aggravate the nerve.   The patient was instructed to ice the occipital region for no more than 20 minutes at least once a day but may repeat this as many times as they would like.   Referral to vestibular PT are convergence and saccadic impairement  Patient was encouraged to do daily light cardio exercise but instructed to limit physical activities to walking, walking in water up to the waist only or riding a stationary bike, recumbent preferred. No weight lifting in upper body, no neck massage, no acupuncture of neck, and no dry needling of upper neck. No neck PT unless otherwise stated. If neck PT is recommended, the therapist may do joint manipulation at this time but no suboccipital soft tissue therapy. Any of your therapists may also do passive neck range of motion activities. No chiropractor work on neck. Lower  body strengthening with resistant bands, leg machines, and strapping weights to legs okay. No core body workouts. No running or use of cardio equipment other than stationary bike. No swimming or body surfing. No amusement park rides. No lifting more than 5-10 lbs and bend at the knees, not the waist.     76 minutes were spent on the date of this patient encounter, which includes: preparing to see the patient, reviewing previous history, obtaining new patient history, performing the physical exam, counseling and educating the patient and/or family/caregiver, ordering necessary medications or tests or referrals, documenting in the electronic medical record, coordinating care.    I personally saw and evaluated the patient. I discussed the patient with the fellow and agree with the fellow's history, exam, diagnostic review, assessment, and plan as documented in the fellow's note.    Mike Freitas MD  Sports Neurology

## 2024-07-23 NOTE — PATIENT INSTRUCTIONS
Referral to Cardio for syncopal event for a possible Holter monitoring  start meloxicam 7.5 mg daily until you return to see us in clinic. Take meloxicam with food.  Discussed ergonomic accommodations for occipital neuritis/neuralgia. Mainly perform all work at eye level to minimize continued neck flexion which will aggravate the nerve.   The patient was instructed to ice the occipital region for no more than 20 minutes at least once a day but may repeat this as many times as they would like.   Referral to vestibular PT are convergence and saccadic impairement  Patient was encouraged to do daily light cardio exercise but instructed to limit physical activities to walking, walking in water up to the waist only or riding a stationary bike, recumbent preferred. No weight lifting in upper body, no neck massage, no acupuncture of neck, and no dry needling of upper neck. No neck PT unless otherwise stated. If neck PT is recommended, the therapist may do joint manipulation at this time but no suboccipital soft tissue therapy. Any of your therapists may also do passive neck range of motion activities. No chiropractor work on neck. Lower body strengthening with resistant bands, leg machines, and strapping weights to legs okay. No core body workouts. No running or use of cardio equipment other than stationary bike. No swimming or body surfing. No amusement park rides. No lifting more than 5-10 lbs and bend at the knees, not the waist.

## 2024-07-24 PROBLEM — S06.0X1A CONCUSSION WTH LOSS OF CONSCIOUSNESS OF 30 MINUTES OR LESS: Status: ACTIVE | Noted: 2024-07-24

## 2024-07-24 PROBLEM — F34.89 OTHER SPECIFIED PERSISTENT MOOD DISORDERS: Status: ACTIVE | Noted: 2024-07-24

## 2024-07-30 ENCOUNTER — OFFICE VISIT (OUTPATIENT)
Dept: CARDIOLOGY | Facility: CLINIC | Age: 39
End: 2024-07-30
Payer: COMMERCIAL

## 2024-07-30 DIAGNOSIS — S06.0X1A CONCUSSION WITH LOSS OF CONSCIOUSNESS OF 30 MINUTES OR LESS, INITIAL ENCOUNTER: ICD-10-CM

## 2024-07-30 DIAGNOSIS — R55 VASOVAGAL SYNCOPE: Primary | ICD-10-CM

## 2024-07-30 PROCEDURE — 99999 PR PBB SHADOW E&M-EST. PATIENT-LVL IV: CPT | Mod: PBBFAC,,, | Performed by: INTERNAL MEDICINE

## 2024-07-30 PROCEDURE — 99204 OFFICE O/P NEW MOD 45 MIN: CPT | Mod: S$GLB,,, | Performed by: INTERNAL MEDICINE

## 2024-07-30 NOTE — PROGRESS NOTES
[facility]    Chief Complaint  Chief Complaint   Patient presents with    Loss of Consciousness       HPI:    Presents for evaluation of syncope.  About 2 weeks ago she was in a restaurant.  Began to have abdominal discomfort.  Felt as though she was going to have a bowel movement.  Felt dizzy and diaphoretic.  Got up to leave the restaurant and she passed out.  Woke up quickly and tried to get up again, again passed out.  This time did not have is quick return to normal sensorium.  Is felt to have sustained a concussion.  Went to emergency department.  That testing has been reviewed.  Had 2 similar episodes about 10 years ago.  Both started with abdominal discomfort.  She has been feeling fatigued with low energy in general lately.  Has been told that her blood pressure runs low.    Medications  Current Outpatient Medications   Medication Sig Dispense Refill    cyanocobalamin 1,000 mcg/mL injection INJECT 1,000 MCG AS DIRECTED EVERY 14 (FOURTEEN) DAYS. 6 mL 2    FLUoxetine 20 MG capsule Take 1 capsule (20 mg total) by mouth once daily. 90 capsule 1    hydrOXYzine HCL (ATARAX) 25 MG tablet Take 2 tablets (50 mg total) by mouth 3 (three) times daily as needed for Anxiety. 60 tablet 5    LIDOcaine (LIDODERM) 5 % Place 1 patch onto the skin once daily. Remove & Discard patch within 12 hours or as directed by MD Akhtar patch 0    meloxicam (MOBIC) 7.5 MG tablet Take 1 tablet (7.5 mg total) by mouth once daily. 30 tablet 3    ondansetron (ZOFRAN-ODT) 4 MG TbDL Take 1 tablet (4 mg total) by mouth every 8 (eight) hours as needed (for nausea). 30 tablet 0    pantoprazole (PROTONIX) 40 MG tablet Take 1 tablet (40 mg total) by mouth 2 (two) times daily. 60 tablet 1    sumatriptan (IMITREX) 50 MG tablet Take 1 tablet (50 mg total) by mouth as needed for Migraine (Take no more than 4 tablets in 24H). 15 tablet 1    naproxen (NAPROSYN) 500 MG tablet Take 1 tablet (500 mg total) by mouth 2 (two) times daily with meals.  (Patient not taking: Reported on 2024) 20 tablet 0     No current facility-administered medications for this visit.        History  Past Medical History:   Diagnosis Date    Anxiety     Asthma     B12 deficiency anemia     Hiatal hernia      Past Surgical History:   Procedure Laterality Date    WRIST SURGERY Bilateral     ligament repair     Social History     Socioeconomic History    Marital status: Single   Tobacco Use    Smoking status: Former     Current packs/day: 0.50     Average packs/day: 0.5 packs/day for 7.0 years (3.5 ttl pk-yrs)     Types: Cigarettes     Passive exposure: Never    Smokeless tobacco: Never    Tobacco comments:     Quit 12 years ago   Substance and Sexual Activity    Alcohol use: Yes     Alcohol/week: 5.0 standard drinks of alcohol     Types: 5 Glasses of wine per week     Comment: socially    Drug use: Yes     Types: Marijuana     Comment: Rarely    Sexual activity: Yes     Partners: Male     Birth control/protection: Condom, None   Social History Narrative    Single. No children (). From [place] but she has moved around [facility] and most recently moved from [place], [place] to [facility] 10/2022. She lives alone. She works in sales and enjoys it. Her mother passed  from aggressive anal cancer during the peak of Covid-19 pandemic and it was very traumatic. She has 2 half sisters, 2 step sisters and 2 step brothers. 1 sister     Family History   Problem Relation Name Age of Onset    Cancer Mother Idalmis         HPV anal cancer    Depression Mother Idalmis     Macular degeneration Mother Idalmis     Early death Mother Idalmis     Asthma Father Randy Frankel     No Known Problems Sister half     No Known Problems Sister half     Lung cancer Maternal Grandmother Tahmina     Breast cancer Maternal Grandmother Tahmina 40 - 49    Cancer Maternal Grandmother Tahmina     No Known Problems Maternal Grandfather      No Known Problems Paternal Grandmother      No Known Problems Paternal Grandfather           Allergies  Review of patient's allergies indicates:  No Known Allergies    Review of Systems   Review of Systems   Constitutional: Positive for malaise/fatigue. Negative for weight gain and weight loss.   Eyes:  Negative for visual disturbance.   Cardiovascular:  Positive for syncope. Negative for chest pain, claudication, cyanosis, dyspnea on exertion, irregular heartbeat, leg swelling, near-syncope, orthopnea, palpitations and paroxysmal nocturnal dyspnea.   Respiratory:  Negative for cough, hemoptysis, shortness of breath, sleep disturbances due to breathing and wheezing.    Hematologic/Lymphatic: Negative for bleeding problem. Does not bruise/bleed easily.   Skin:  Negative for poor wound healing.   Musculoskeletal:  Negative for muscle cramps and myalgias.   Gastrointestinal:  Negative for abdominal pain, anorexia, diarrhea, heartburn, hematemesis, hematochezia, melena, nausea and vomiting.   Genitourinary:  Negative for hematuria and nocturia.   Neurological:  Negative for excessive daytime sleepiness, dizziness, focal weakness, light-headedness and weakness.       Physical Exam  Vitals:    07/30/24 1333   BP: (P) 108/64   Pulse: (P) 101     Wt Readings from Last 1 Encounters:   07/30/24 (P) 57.2 kg (126 lb 3.2 oz)     Physical Exam  Vitals and nursing note reviewed.   Constitutional:       General: She is not in acute distress.     Appearance: She is not toxic-appearing or diaphoretic.   HENT:      Head: Normocephalic and atraumatic.      Mouth/Throat:      Lips: Pink.      Mouth: Mucous membranes are moist.   Eyes:      General: No scleral icterus.     Conjunctiva/sclera: Conjunctivae normal.   Neck:      Thyroid: No thyromegaly.      Vascular: No carotid bruit, hepatojugular reflux or JVD.      Trachea: Trachea normal.   Cardiovascular:      Rate and Rhythm: Normal rate and regular rhythm. No extrasystoles are present.     Chest Wall: PMI is not displaced.      Pulses:           Carotid pulses are 2+ on  the right side and 2+ on the left side.       Radial pulses are 2+ on the right side and 2+ on the left side.        Dorsalis pedis pulses are 2+ on the right side and 2+ on the left side.        Posterior tibial pulses are 2+ on the right side and 2+ on the left side.      Heart sounds: S1 normal and S2 normal. No murmur heard.     No friction rub. No S3 or S4 sounds.   Pulmonary:      Effort: Pulmonary effort is normal. No accessory muscle usage or respiratory distress.      Breath sounds: Normal breath sounds and air entry. No decreased breath sounds, wheezing, rhonchi or rales.   Abdominal:      General: Bowel sounds are normal. There is no distension or abdominal bruit.      Palpations: Abdomen is soft. There is no hepatomegaly, splenomegaly or pulsatile mass.      Tenderness: There is no abdominal tenderness.   Musculoskeletal:         General: No tenderness or deformity.      Right lower leg: No edema.      Left lower leg: No edema.   Skin:     General: Skin is warm and dry.      Capillary Refill: Capillary refill takes less than 2 seconds.      Coloration: Skin is not cyanotic or pale.      Nails: There is no clubbing.   Neurological:      General: No focal deficit present.      Mental Status: She is alert and oriented to person, place, and time.   Psychiatric:         Attention and Perception: Attention normal.         Mood and Affect: Mood normal.         Speech: Speech normal.         Behavior: Behavior normal. Behavior is cooperative.         Labs  Admission on 07/20/2024, Discharged on 07/20/2024   Component Date Value Ref Range Status    D-Dimer 07/20/2024 0.57 (H)  <0.50 mg/L FEU Final    Comment: The quantitative D-dimer assay should be used as an aid in   the diagnosis of deep vein thrombosis and pulmonary embolism  in patients with the appropriate presentation and clinical  history. The upper limit of the reference interval and the clinical   cut off   point are identical. Causes of a positive  (>0.50 mg/L FEU) D-Dimer   test  include, but are not limited to: DVT, PE, DIC, thrombolytic   therapy, anticoagulant therapy, recent surgery, trauma, or   pregnancy, disseminated malignancy, aortic aneurysm, cirrhosis,  and severe infection. False negative results may occur in   patients with distal DVT.  ANDRE^612^^41^  LOT^610^[id number]\[id number]^[id number]      POC Preg Test, Ur 07/20/2024 Negative  Negative Final     Acceptable 07/20/2024 Yes   Final    WBC 07/20/2024 9.93  3.90 - 12.70 K/uL Final    RBC 07/20/2024 3.51 (L)  4.00 - 5.40 M/uL Final    Hemoglobin 07/20/2024 11.0 (L)  12.0 - 16.0 g/dL Final    Hematocrit 07/20/2024 32.3 (L)  37.0 - 48.5 % Final    MCV 07/20/2024 92  82 - 98 fL Final    MCH 07/20/2024 31.3 (H)  27.0 - 31.0 pg Final    MCHC 07/20/2024 34.1  32.0 - 36.0 g/dL Final    RDW 07/20/2024 12.2  11.5 - 14.5 % Final    Platelets 07/20/2024 215  150 - 450 K/uL Final    MPV 07/20/2024 10.4  9.2 - 12.9 fL Final    Immature Granulocytes 07/20/2024 0.4  0.0 - 0.5 % Final    Gran # (ANC) 07/20/2024 8.1 (H)  1.8 - 7.7 K/uL Final    Immature Grans (Abs) 07/20/2024 0.04  0.00 - 0.04 K/uL Final    Comment: Mild elevation in immature granulocytes is non specific and   can be seen in a variety of conditions including stress response,   acute inflammation, trauma and pregnancy. Correlation with other   laboratory and clinical findings is essential.      Lymph # 07/20/2024 1.2  1.0 - 4.8 K/uL Final    Mono # 07/20/2024 0.6  0.3 - 1.0 K/uL Final    Eos # 07/20/2024 0.0  0.0 - 0.5 K/uL Final    Baso # 07/20/2024 0.02  0.00 - 0.20 K/uL Final    nRBC 07/20/2024 0  0 /100 WBC Final    Gran % 07/20/2024 81.7 (H)  38.0 - 73.0 % Final    Lymph % 07/20/2024 11.9 (L)  18.0 - 48.0 % Final    Mono % 07/20/2024 5.5  4.0 - 15.0 % Final    Eosinophil % 07/20/2024 0.3  0.0 - 8.0 % Final    Basophil % 07/20/2024 0.2  0.0 - 1.9 % Final    Differential Method 07/20/2024 Automated   Final    Sodium  07/20/2024 140  136 - 145 mmol/L Final    Potassium 07/20/2024 3.6  3.5 - 5.1 mmol/L Final    Specimen slightly hemolyzed    Chloride 07/20/2024 108  95 - 110 mmol/L Final    CO2 07/20/2024 21 (L)  23 - 29 mmol/L Final    Glucose 07/20/2024 106  70 - 110 mg/dL Final    BUN 07/20/2024 9  6 - 20 mg/dL Final    Creatinine 07/20/2024 0.9  0.5 - 1.4 mg/dL Final    Calcium 07/20/2024 9.2  8.7 - 10.5 mg/dL Final    Total Protein 07/20/2024 6.1  6.0 - 8.4 g/dL Final    Albumin 07/20/2024 3.9  3.5 - 5.2 g/dL Final    Total Bilirubin 07/20/2024 0.6  0.1 - 1.0 mg/dL Final    Comment: For infants and newborns, interpretation of results should be based  on gestational age, weight and in agreement with clinical  observations.    Premature Infant recommended reference ranges:  Up to 24 hours.............<8.0 mg/dL  Up to 48 hours............<12.0 mg/dL  3-5 days..................<15.0 mg/dL  6-29 days.................<15.0 mg/dL      Alkaline Phosphatase 07/20/2024 40 (L)  55 - 135 U/L Final    AST 07/20/2024 20  10 - 40 U/L Final    ALT 07/20/2024 14  10 - 44 U/L Final    eGFR 07/20/2024 >60  >60 mL/min/1.73 m^2 Final    Anion Gap 07/20/2024 11  8 - 16 mmol/L Final    QRS Duration 07/20/2024 86  ms Final    OHS QTC Calculation 07/20/2024 465  ms Final    Troponin I 07/20/2024 <0.006  0.000 - 0.026 ng/mL Final    Comment: The reference interval for Troponin I represents the 99th percentile   cutoff   for our facility and is consistent with 3rd generation assay   performance.      Specimen UA 07/20/2024 Urine, Clean Catch   Final    Color, UA 07/20/2024 Yellow  Yellow, Straw, Angelina Final    Appearance, UA 07/20/2024 Hazy (A)  Clear Final    pH, UA 07/20/2024 6.0  5.0 - 8.0 Final    Specific Gravity, UA 07/20/2024 >=1.030 (A)  1.005 - 1.030 Final    Protein, UA 07/20/2024 Trace (A)  Negative Final    Comment: Recommend a 24 hour urine protein or a urine   protein/creatinine ratio if globulin induced proteinuria is  clinically  suspected.      Glucose, UA 07/20/2024 Negative  Negative Final    Ketones, UA 07/20/2024 Trace (A)  Negative Final    Bilirubin (UA) 07/20/2024 1+ (A)  Negative Final    Comment: Positive urine bilirubin is not confirmed. Correlate with   serum bilirubin and clinical presentation.      Occult Blood UA 07/20/2024 Negative  Negative Final    Nitrite, UA 07/20/2024 Negative  Negative Final    Urobilinogen, UA 07/20/2024 Negative  <2.0 EU/dL Final    Leukocytes, UA 07/20/2024 Negative  Negative Final    Benzodiazepines 07/20/2024 Negative  Negatiive Final    Methadone metabolites 07/20/2024 Negative  Negative Final    Cocaine (Metab.) 07/20/2024 Presumptive Positive (A)  Negative Final    Opiate Scrn, Ur 07/20/2024 Negative  Negative Final    Amphetamine Screen, Ur 07/20/2024 Negative  Negative Final    THC 07/20/2024 Presumptive Positive (A)  Negative Final    Phencyclidine 07/20/2024 Negative  Negative Final    Creatinine, Urine 07/20/2024 347.6 (H)  15.0 - 325.0 mg/dL Final    Toxicology Information 07/20/2024 SEE COMMENT   Final    Comment: This screen includes the following classes of drugs at the listed   cut-off:    Benzodiazepines 200 ng/ml  Methadone 300 ng/ml  Cocaine metabolite 300 ng/ml  Opiates 300 ng/ml  Barbiturates 200 ng/ml  Amphetamines 1000 ng/ml  Marijuana metabs (THC) 50 ng/ml  Phencyclidine (PCP) 25 ng/ml    This is a screening test. If results do not correlate with clinical   presentation, then a confirmatory send out test is advised.     This report is intended for use in clinical monitoring and management   of   patients. It is not intended for use in employment related drug   testing.     Office Visit on 06/13/2024   Component Date Value Ref Range Status    SARS Coronavirus 2 Antigen 06/13/2024 Negative  Negative Final     Acceptable 06/13/2024 Yes   Final    Molecular Strep A, POC 06/13/2024 Negative  Negative Final     Acceptable 06/13/2024 Yes   Final   Lab  Visit on 06/10/2024   Component Date Value Ref Range Status    H. Pylori Antigen, Stool 06/10/2024 NOT DETECTED   Final    Comment: Reference Range:  NOT DETECTED    Antimicrobials, proton pump inhibitors, and bismuth preparations  inhibit H. pylori and ingestion up to two weeks prior to testing may  cause false negative results. If clinically indicated the test should  be repeated on a new specimen obtained two weeks after discontinuing  treatment.  Test Performed at:  [facility] [address]  [address]     HAIR Hernandez MD, PhD, JOVON      Occult Blood 06/10/2024 Negative  Negative Final    Stool WBC 06/10/2024 No neutrophils seen  No neutrophils seen Final    Stool Culture 06/10/2024 No enteric kalina   Final    Giardia Antigen - EIA 06/10/2024 Negative  Negative Final    Cryptosporidium Antigen 06/10/2024 Negative  Negative Final    Stool Exam-Ova,Cysts,Parasites 06/10/2024 FINAL 06/15/2024 1332   Final    Comment: SOURCE: STOOL, STLP  OVA AND PARASITE, MICROSCOPY, F                        FINAL    No parasites seen.   Cryptosporidium, Cyclospora, and microsporidia are not   readily detected by this method.   Single negative specimen does not rule out   parasitic infection.    Test Performed by:  [facility] [place] [address]  : Vinh Roberto Ph.D.; CLIA# [id number]      Rotavirus 06/10/2024 Negative  Negative Final    Shiga Toxin 1 E.coli 06/10/2024 Negative   Final    Shiga Toxin 2 E.coli 06/10/2024 Negative   Final   Lab Visit on 06/06/2024   Component Date Value Ref Range Status    Hepatitis C Ab 06/06/2024 Non-reactive  Non-reactive Final    Magnesium 06/06/2024 2.3  1.6 - 2.6 mg/dL Final    WBC 06/06/2024 4.76  3.90 - 12.70 K/uL Final    RBC 06/06/2024 4.11  4.00 - 5.40 M/uL Final    Hemoglobin 06/06/2024 13.0  12.0 - 16.0 g/dL Final    Hematocrit 06/06/2024 39.7  37.0 - 48.5 %  Final    MCV 06/06/2024 97  82 - 98 fL Final    MCH 06/06/2024 31.6 (H)  27.0 - 31.0 pg Final    MCHC 06/06/2024 32.7  32.0 - 36.0 g/dL Final    RDW 06/06/2024 12.6  11.5 - 14.5 % Final    Platelets 06/06/2024 248  150 - 450 K/uL Final    MPV 06/06/2024 11.4  9.2 - 12.9 fL Final    Immature Granulocytes 06/06/2024 0.2  0.0 - 0.5 % Final    Gran # (ANC) 06/06/2024 2.8  1.8 - 7.7 K/uL Final    Immature Grans (Abs) 06/06/2024 0.01  0.00 - 0.04 K/uL Final    Comment: Mild elevation in immature granulocytes is non specific and   can be seen in a variety of conditions including stress response,   acute inflammation, trauma and pregnancy. Correlation with other   laboratory and clinical findings is essential.      Lymph # 06/06/2024 1.4  1.0 - 4.8 K/uL Final    Mono # 06/06/2024 0.4  0.3 - 1.0 K/uL Final    Eos # 06/06/2024 0.1  0.0 - 0.5 K/uL Final    Baso # 06/06/2024 0.03  0.00 - 0.20 K/uL Final    nRBC 06/06/2024 0  0 /100 WBC Final    Gran % 06/06/2024 59.4  38.0 - 73.0 % Final    Lymph % 06/06/2024 30.3  18.0 - 48.0 % Final    Mono % 06/06/2024 8.4  4.0 - 15.0 % Final    Eosinophil % 06/06/2024 1.1  0.0 - 8.0 % Final    Basophil % 06/06/2024 0.6  0.0 - 1.9 % Final    Differential Method 06/06/2024 Automated   Final    Sodium 06/06/2024 138  136 - 145 mmol/L Final    Potassium 06/06/2024 4.2  3.5 - 5.1 mmol/L Final    Chloride 06/06/2024 108  95 - 110 mmol/L Final    CO2 06/06/2024 22 (L)  23 - 29 mmol/L Final    Glucose 06/06/2024 85  70 - 110 mg/dL Final    BUN 06/06/2024 7  6 - 20 mg/dL Final    Creatinine 06/06/2024 0.8  0.5 - 1.4 mg/dL Final    Calcium 06/06/2024 10.0  8.7 - 10.5 mg/dL Final    Total Protein 06/06/2024 7.2  6.0 - 8.4 g/dL Final    Albumin 06/06/2024 4.4  3.5 - 5.2 g/dL Final    Total Bilirubin 06/06/2024 0.5  0.1 - 1.0 mg/dL Final    Comment: For infants and newborns, interpretation of results should be based  on gestational age, weight and in agreement with clinical  observations.    Premature  Infant recommended reference ranges:  Up to 24 hours.............<8.0 mg/dL  Up to 48 hours............<12.0 mg/dL  3-5 days..................<15.0 mg/dL  6-29 days.................<15.0 mg/dL      Alkaline Phosphatase 06/06/2024 45 (L)  55 - 135 U/L Final    AST 06/06/2024 15  10 - 40 U/L Final    ALT 06/06/2024 8 (L)  10 - 44 U/L Final    eGFR 06/06/2024 >60.0  >60 mL/min/1.73 m^2 Final    Anion Gap 06/06/2024 8  8 - 16 mmol/L Final    Ferritin 06/06/2024 28  20.0 - 300.0 ng/mL Final    Iron 06/06/2024 90  30 - 160 ug/dL Final    Transferrin 06/06/2024 266  200 - 375 mg/dL Final    TIBC 06/06/2024 394  250 - 450 ug/dL Final    Saturated Iron 06/06/2024 23  20 - 50 % Final    Vitamin B6 06/06/2024 14  5 - 50 ug/L Final    Comment: This test was developed and the performance   characteristics determined by [facility].   It has not been cleared or approved by the FDA.   The laboratory is regulated under CLIA as qualified to   perform high-complexity testing. This test is used for   patient testing purposes. It should not be regarded   as investigational or for research.    Test performed at [facility],  [address] [place]  [address]     [phone]  Diane Shi MD, PhD - Medical Director      Thiamine 06/06/2024 57  38 - 122 ug/L Final    Comment: This test was developed and the performance   characteristics determined by [facility].   It has not been cleared or approved by the FDA.   The laboratory is regulated under CLIA as qualified to   perform high-complexity testing. This test is used for   patient testing purposes. It should not be regarded   as investigational or for research.    Test performed at [facility],  [address] [place] [place]  [address]     [phone]  Diane Shi MD, PhD - Medical Director      Folate 06/06/2024 6.8  4.0 - 24.0 ng/mL Final    Vitamin B-12 06/06/2024 359  210 - 950 pg/mL Final    Vit D, 25-Hydroxy  06/06/2024 36  30 - 96 ng/mL Final    Comment: Vitamin D deficiency.........<10 ng/mL                              Vitamin D insufficiency......10-29 ng/mL       Vitamin D sufficiency........> or equal to 30 ng/mL  Vitamin D toxicity............>100 ng/mL      Cholesterol 06/06/2024 201 (H)  120 - 199 mg/dL Final    Comment: The National Cholesterol Education Program (NCEP) has set the  following guidelines (reference ranges) for Cholesterol:  Optimal.....................<200 mg/dL  Borderline High.............200-239 mg/dL  High........................> or = 240 mg/dL      Triglycerides 06/06/2024 80  30 - 150 mg/dL Final    Comment: The National Cholesterol Education Program (NCEP) has set the  following guidelines (reference values) for triglycerides:  Normal......................<150 mg/dL  Borderline High.............150-199 mg/dL  High........................200-499 mg/dL      HDL 06/06/2024 63  40 - 75 mg/dL Final    Comment: The National Cholesterol Education Program (NCEP) has set the  following guidelines (reference values) for HDL Cholesterol:  Low...............<40 mg/dL  Optimal...........>60 mg/dL      LDL Cholesterol 06/06/2024 122.0  63.0 - 159.0 mg/dL Final    Comment: The National Cholesterol Education Program (NCEP) has set the  following guidelines (reference values) for LDL Cholesterol:  Optimal.......................<130 mg/dL  Borderline High...............130-159 mg/dL  High..........................160-189 mg/dL  Very High.....................>190 mg/dL      HDL/Cholesterol Ratio 06/06/2024 31.3  20.0 - 50.0 % Final    Total Cholesterol/HDL Ratio 06/06/2024 3.2  2.0 - 5.0 Final    Non-HDL Cholesterol 06/06/2024 138  mg/dL Final    Comment: Risk category and Non-HDL cholesterol goals:  Coronary heart disease (CHD)or equivalent (10-year risk of CHD >20%):  Non-HDL cholesterol goal     <130 mg/dL  Two or more CHD risk factors and 10-year risk of CHD <= 20%:  Non-HDL cholesterol goal     <160  mg/dL  0 to 1 CHD risk factor:  Non-HDL cholesterol goal     <190 mg/dL         Imaging  CTA Chest Non-Coronary (PE Studies)    Result Date: 7/20/2024  EXAMINATION: CTA CHEST NON CORONARY (PE STUDIES) CLINICAL HISTORY: Pulmonary embolism (PE) suspected, positive D-dimer; TECHNIQUE: Low dose axial images, sagittal and coronal reformations were obtained from the thoracic inlet to the lung bases following the IV administration of 75 mL of Omnipaque 350.  Contrast timing was optimized to evaluate the pulmonary arteries.  MIP images were performed. COMPARISON: None. FINDINGS: Examination of the soft tissue and vascular structures at the base of the neck is unremarkable. The thoracic aorta maintains normal caliber, contour, and course without significant atherosclerotic calcification.  There is no evidence of aneurysmal dilation or dissection. The pulmonary arteries distribute normally without evidence of filling defect to indicate pulmonary thromboembolism. The trachea and proximal airways are patent. The lungs are symmetrically expanded and without evidence of consolidation, pneumothorax, mass, or significant pleural thickening.  No evidence of pleural fluid. The heart is not enlarged.  No pericardial effusion.  No abnormal bowing of the interventricular septum. There is no axillary, mediastinal, or hilar lymph node enlargement. The esophagus maintains a normal course and caliber. Limited images of the upper abdomen obtained during the course of this dedicated thoracic CT is negative for acute findings. The osseous structures are negative for acute finding or aggressive osseous lesion.     No acute abnormality of the chest. Specifically, no evidence of pulmonary thromboembolism. Electronically signed by: Bhanu Hayes MD Date:    07/20/2024 Time:    21:18    CT Head Without Contrast    Result Date: 7/20/2024  EXAMINATION: CT HEAD WITHOUT CONTRAST; CT MAXILLOFACIAL WITHOUT CONTRAST CLINICAL HISTORY: Facial trauma,  blunt;  No additional information is available in the chart for clinical correlation at the time of dictation. TECHNIQUE: CT images of the head and maxillofacial bones without contrast.  Coronal and sagittal reconstructions were created for both acquisitions. COMPARISON: None. FINDINGS: CT head: No evidence of acute territorial infarct, hemorrhage, mass effect, or midline shift. The ventricles are normal in size and configuration. No extra-axial hemorrhage or mass. No displaced calvarial fracture. CT maxillofacial: Minimal age-indeterminate nasal bone deformity without acute depressed fracture or associated soft tissue swelling.  Suggest correlation with point tenderness and mechanism of injury.  There is also slight deviation of the nasal septum which is likely chronic.  No acute displaced facial fracture. Globes are symmetric.  Retrobulbar fat is preserved. Small mucosal retention cyst in the inferior left maxillary sinus.  Otherwise, the visualized paranasal sinuses and mastoid air cells are essentially clear.     No CT evidence of acute intracranial abnormality. Probable remote minimal nasal bone deformity.  Suggest correlation with point tenderness and mechanism of injury. Additional findings discussed in the body of the report. Electronically signed by: Bhanu Hayes MD Date:    07/20/2024 Time:    20:59    CT Maxillofacial Without Contrast    Result Date: 7/20/2024  EXAMINATION: CT HEAD WITHOUT CONTRAST; CT MAXILLOFACIAL WITHOUT CONTRAST CLINICAL HISTORY: Facial trauma, blunt;  No additional information is available in the chart for clinical correlation at the time of dictation. TECHNIQUE: CT images of the head and maxillofacial bones without contrast.  Coronal and sagittal reconstructions were created for both acquisitions. COMPARISON: None. FINDINGS: CT head: No evidence of acute territorial infarct, hemorrhage, mass effect, or midline shift. The ventricles are normal in size and configuration. No  extra-axial hemorrhage or mass. No displaced calvarial fracture. CT maxillofacial: Minimal age-indeterminate nasal bone deformity without acute depressed fracture or associated soft tissue swelling.  Suggest correlation with point tenderness and mechanism of injury.  There is also slight deviation of the nasal septum which is likely chronic.  No acute displaced facial fracture. Globes are symmetric.  Retrobulbar fat is preserved. Small mucosal retention cyst in the inferior left maxillary sinus.  Otherwise, the visualized paranasal sinuses and mastoid air cells are essentially clear.     No CT evidence of acute intracranial abnormality. Probable remote minimal nasal bone deformity.  Suggest correlation with point tenderness and mechanism of injury. Additional findings discussed in the body of the report. Electronically signed by: Bhanu Hayes MD Date:    07/20/2024 Time:    20:59      Assessment  1. Vasovagal syncope  By history, likely vasovagal  - Echo; Future  - Cardiac event monitor; Future    2. Concussion with loss of consciousness of 30 minutes or less, initial encounter  Being followed by Neurology  - Ambulatory referral/consult to Cardiology      Plan and Discussion    Discussed vasovagal syncope.  Discussed measures to mitigate episodes.  Discussed evaluation for other potential causes of syncope.    The ASCVD Risk score (Domenica DK, et al., 2019) failed to calculate for the following reasons:    The 2019 ASCVD risk score is only valid for ages 40 to 79     Follow Up  Follow up for test results.      David Estevez MD

## 2024-07-31 ENCOUNTER — ON-DEMAND VIRTUAL (OUTPATIENT)
Dept: URGENT CARE | Facility: CLINIC | Age: 39
End: 2024-07-31
Payer: COMMERCIAL

## 2024-07-31 ENCOUNTER — NURSE TRIAGE (OUTPATIENT)
Dept: ADMINISTRATIVE | Facility: CLINIC | Age: 39
End: 2024-07-31
Payer: COMMERCIAL

## 2024-07-31 DIAGNOSIS — K52.9 GASTROENTERITIS: Primary | ICD-10-CM

## 2024-07-31 PROCEDURE — 99212 OFFICE O/P EST SF 10 MIN: CPT | Mod: 95,,, | Performed by: NURSE PRACTITIONER

## 2024-07-31 NOTE — PATIENT INSTRUCTIONS
If vomiting starts, discontinue solid foods, rest gut until vomitting resolves then start  Clear liquids only, such as clear non-acidic juices and/or sport drinks  Start with 1 tbsp(15cc) every 10 minutes, if vomitting does not occur, double the amount each hour.  If vomitting does occur allow stomach to rest breifly and then start again  Progress to a BRAT diet as tolerated (bananas, rice, applesauce, toast)  Avoid milk times 1 week  May take Pepto bismal or Emetrol as needed for nausea  May take imodium for diarrhea persistent beyond 4-5 days  Bath soaks in epsom salts for rectal irritation  TUCS pads to rectum for comfort after each bowel movement  Follow up with PCP for persistent symptoms      Patient Instructions   - You must understand that you have received an Urgent Care treatment only and that you may be released before all of your medical problems are known or treated.   - You, the patient, will arrange for follow up care as instructed.   - If your condition worsens or fails to improve we recommend that you receive another evaluation at the ER immediately or contact your PCP to discuss your concerns or return here.     Advised on return/follow-up precautions. Advised on ER precautions. Answered all patient questions. Patient verbalized understanding and voiced agreement with current treatment plan.

## 2024-07-31 NOTE — TELEPHONE ENCOUNTER
Spoke with boyfrienmj Moy of pt who reports that that called him this morning states that she has bene having constant diarrhea.  States that she was also vomiting. States she did have fall at restaurant a weeks ago, where she hit her had. States was seen in ED after fall. States not currently with pt. Advised I will reach out to pt. Verbalized understanding.    Spoke with pt who states that vomiting stopped yesterday, but have diarrhea. States that she symptoms of diarrhea, and vomiting has worsened since fall. Advised to be seen in ED. Verbalized understanding.    Reason for Disposition   SEVERE abdominal pain (e.g., excruciating) and present > 1 hour    Additional Information   Negative: Shock suspected (e.g., cold/pale/clammy skin, too weak to stand, low BP, rapid pulse)   Negative: Difficult to awaken or acting confused (e.g., disoriented, slurred speech)   Negative: Sounds like a life-threatening emergency to the triager    Protocols used: Diarrhea-A-OH

## 2024-07-31 NOTE — PROGRESS NOTES
Subjective:      Patient ID: Elizabeth Frankel is a 39 y.o. female.    Vitals:  vitals were not taken for this visit.     Chief Complaint: Vomiting      Visit Type: TELE AUDIOVISUAL    Present with the patient at the time of consultation: TELEMED PRESENT WITH PATIENT: None    Past Medical History:   Diagnosis Date    Anxiety     Asthma     B12 deficiency anemia     Hiatal hernia      Past Surgical History:   Procedure Laterality Date    WRIST SURGERY Bilateral     ligament repair     Review of patient's allergies indicates:  No Known Allergies  Current Outpatient Medications on File Prior to Visit   Medication Sig Dispense Refill    cyanocobalamin 1,000 mcg/mL injection INJECT 1,000 MCG AS DIRECTED EVERY 14 (FOURTEEN) DAYS. 6 mL 2    FLUoxetine 20 MG capsule Take 1 capsule (20 mg total) by mouth once daily. 90 capsule 1    hydrOXYzine HCL (ATARAX) 25 MG tablet Take 2 tablets (50 mg total) by mouth 3 (three) times daily as needed for Anxiety. 60 tablet 5    LIDOcaine (LIDODERM) 5 % Place 1 patch onto the skin once daily. Remove & Discard patch within 12 hours or as directed by MD Akhtar patch 0    meloxicam (MOBIC) 7.5 MG tablet Take 1 tablet (7.5 mg total) by mouth once daily. 30 tablet 3    naproxen (NAPROSYN) 500 MG tablet Take 1 tablet (500 mg total) by mouth 2 (two) times daily with meals. (Patient not taking: Reported on 7/30/2024) 20 tablet 0    ondansetron (ZOFRAN-ODT) 4 MG TbDL Take 1 tablet (4 mg total) by mouth every 8 (eight) hours as needed (for nausea). 30 tablet 0    pantoprazole (PROTONIX) 40 MG tablet Take 1 tablet (40 mg total) by mouth 2 (two) times daily. 60 tablet 1    sumatriptan (IMITREX) 50 MG tablet Take 1 tablet (50 mg total) by mouth as needed for Migraine (Take no more than 4 tablets in 24H). 15 tablet 1     No current facility-administered medications on file prior to visit.     Family History   Problem Relation Name Age of Onset    Cancer Mother Idalmis         HPV anal cancer    Depression  Mother Idalmis     Macular degeneration Mother Idalmis     Early death Mother Idalmis     Asthma Father Randy Frankel     No Known Problems Sister half     No Known Problems Sister half     Lung cancer Maternal Grandmother Tahmina     Breast cancer Maternal Grandmother Tahmina 40 - 49    Cancer Maternal Grandmother Tahmina     No Known Problems Maternal Grandfather      No Known Problems Paternal Grandmother      No Known Problems Paternal Grandfather             [facility] Od[facility] Intake    7/31/2024  9:31 AM CDT - Filed by Patient   What is your current physical address in the event of a medical emergency? [address] [facility] [address]   Are you able to take your vital signs? No   Please attach any relevant images or files          [place]   Sx onset x 2 days to include, vomiting, diarrhea, + sweats, chills  Denies fever,         Constitution: Positive for chills and sweating. Negative for fatigue and fever.   Neck: Negative for painful lymph nodes.   Cardiovascular:  Negative for chest pain, palpitations and sob on exertion.   Respiratory:  Negative for chest tightness, cough and shortness of breath.    Gastrointestinal:  Positive for nausea, vomiting and diarrhea. Negative for abdominal pain, constipation, bright red blood in stool, dark colored stools and rectal bleeding.   Genitourinary:  Negative for dysuria, frequency, urgency, flank pain, hematuria, vaginal pain, vaginal discharge, vaginal bleeding, vaginal odor, genital sore and pelvic pain.   Musculoskeletal:  Negative for muscle ache.   Skin:  Negative for color change, pale and rash.   Hematologic/Lymphatic: Negative for swollen lymph nodes.        Objective:   The physical exam was conducted virtually.  Physical Exam   Constitutional: She is oriented to person, place, and time. No distress.   HENT:   Head: Normocephalic and atraumatic.   Mouth/Throat: Oropharynx is clear and moist and mucous membranes are normal.   Eyes: Conjunctivae  are normal. No scleral icterus.   Pulmonary/Chest: Effort normal. No respiratory distress.   Abdominal: She exhibits no distension. There is no guarding.   Musculoskeletal: Normal range of motion.         General: Normal range of motion.   Neurological: She is alert and oriented to person, place, and time.   Skin: Skin is not diaphoretic.   Psychiatric: Her behavior is normal. Judgment and thought content normal.   Vitals reviewed.      Assessment:     1. Gastroenteritis        Plan:       Gastroenteritis                  Patient Instructions   If vomiting starts, discontinue solid foods, rest gut until vomitting resolves then start  Clear liquids only, such as clear non-acidic juices and/or sport drinks  Start with 1 tbsp(15cc) every 10 minutes, if vomitting does not occur, double the amount each hour.  If vomitting does occur allow stomach to rest breifly and then start again  Progress to a BRAT diet as tolerated (bananas, rice, applesauce, toast)  Avoid milk times 1 week  May take Pepto bismal or Emetrol as needed for nausea  May take imodium for diarrhea persistent beyond 4-5 days  Bath soaks in epsom salts for rectal irritation  TUCS pads to rectum for comfort after each bowel movement  Follow up with PCP for persistent symptoms      Patient Instructions   - You must understand that you have received an Urgent Care treatment only and that you may be released before all of your medical problems are known or treated.   - You, the patient, will arrange for follow up care as instructed.   - If your condition worsens or fails to improve we recommend that you receive another evaluation at the ER immediately or contact your PCP to discuss your concerns or return here.     Advised on return/follow-up precautions. Advised on ER precautions. Answered all patient questions. Patient verbalized understanding and voiced agreement with current treatment plan.

## 2024-08-01 ENCOUNTER — PATIENT MESSAGE (OUTPATIENT)
Dept: PRIMARY CARE CLINIC | Facility: CLINIC | Age: 39
End: 2024-08-01
Payer: COMMERCIAL

## 2024-08-02 ENCOUNTER — TELEPHONE (OUTPATIENT)
Dept: NEUROLOGY | Facility: CLINIC | Age: 39
End: 2024-08-02
Payer: COMMERCIAL

## 2024-08-02 NOTE — TELEPHONE ENCOUNTER
Please ask if she is allowed to do steroid injections as would want to evaluate back in clinic for injections then.

## 2024-08-02 NOTE — TELEPHONE ENCOUNTER
Spoke to Pt about her headache. It was recommended that if the pain becomes unbearable that she should go to the ER. Pt stated she might do that. Moved Pt's appt from August 8 to August 7.        How long have you been experiencing these symptoms?  Any obvious or known triggers?   Experiencing pain the past three days and gotten worse with the passing days     Where on your head is the pain located, and on a scale of 0-10, how would you rate it at it's worst?  Behind her eyes, back of head, as well as neck pain radiating down her arm  10/10 pain     Is there anything that helps lessen the pain or make it worse?  Sumatriptan helps take it down 20% but does not knock it down     Are you having any other symptoms (nausea, vomiting, sensitivities to light, etc)?  Vomiting earlier this week  Nausea, sensitive to light and noise  Woke up this morning with blurry vision    What medications, if any, have been taken in attempt to help and have they worked at all?  Sumatriptan (this morning about 6 am) and meloxicam (yesterday) worried that this is hurting her stomach   Using lidocaine patches and icing the back of her neck         ----- Message from Nela Alexander sent at 8/2/2024  8:55 AM CDT -----  Regarding: call back  Type: Patient Call Back    Who called: brandon Winter    What is the request in detail: requesting call to pt to discuss pt persistent migraine symptoms following recent concussion     Can the clinic reply by [facility]?no    Would the patient rather a call back or a response via [facility]? call    Best call back number: [phone]     Additional Information:

## 2024-08-02 NOTE — TELEPHONE ENCOUNTER
Pt sent a message stating that she has fainted twice and fell. Pt went to ER to be evaluated and has a concussion and bruised ribs. Pt would like her recent labs and imaging to be reviewed. Pt states she is concerned about her lightheadedness and low energy

## 2024-08-05 ENCOUNTER — TELEPHONE (OUTPATIENT)
Dept: NEUROLOGY | Facility: CLINIC | Age: 39
End: 2024-08-05
Payer: COMMERCIAL

## 2024-08-06 ENCOUNTER — CLINICAL SUPPORT (OUTPATIENT)
Dept: INTERNAL MEDICINE | Facility: CLINIC | Age: 39
End: 2024-08-06
Payer: COMMERCIAL

## 2024-08-06 ENCOUNTER — OFFICE VISIT (OUTPATIENT)
Dept: INTERNAL MEDICINE | Facility: CLINIC | Age: 39
End: 2024-08-06
Payer: COMMERCIAL

## 2024-08-06 VITALS
BODY MASS INDEX: 19.78 KG/M2 | HEIGHT: 67 IN | SYSTOLIC BLOOD PRESSURE: 132 MMHG | DIASTOLIC BLOOD PRESSURE: 85 MMHG | HEART RATE: 75 BPM | OXYGEN SATURATION: 99 % | WEIGHT: 126 LBS

## 2024-08-06 DIAGNOSIS — M54.2 CERVICALGIA: ICD-10-CM

## 2024-08-06 DIAGNOSIS — R53.83 FATIGUE, UNSPECIFIED TYPE: ICD-10-CM

## 2024-08-06 DIAGNOSIS — S06.0X9S CONCUSSION WITH LOSS OF CONSCIOUSNESS, SEQUELA: ICD-10-CM

## 2024-08-06 DIAGNOSIS — F41.1 GENERALIZED ANXIETY DISORDER: ICD-10-CM

## 2024-08-06 DIAGNOSIS — R53.83 FATIGUE, UNSPECIFIED TYPE: Primary | ICD-10-CM

## 2024-08-06 DIAGNOSIS — Z00.00 ENCOUNTER FOR SCREENING AND PREVENTATIVE CARE: ICD-10-CM

## 2024-08-06 DIAGNOSIS — Z87.11 HISTORY OF STOMACH ULCERS: ICD-10-CM

## 2024-08-06 DIAGNOSIS — R55 SYNCOPE, UNSPECIFIED SYNCOPE TYPE: ICD-10-CM

## 2024-08-06 PROBLEM — Z71.3 WEIGHT LOSS COUNSELING, ENCOUNTER FOR: Status: RESOLVED | Noted: 2023-02-09 | Resolved: 2024-08-06

## 2024-08-06 LAB
ALBUMIN SERPL BCP-MCNC: 3.9 G/DL (ref 3.5–5.2)
ALP SERPL-CCNC: 59 U/L (ref 55–135)
ALT SERPL W/O P-5'-P-CCNC: 18 U/L (ref 10–44)
ANION GAP SERPL CALC-SCNC: 11 MMOL/L (ref 8–16)
AST SERPL-CCNC: 15 U/L (ref 10–40)
BASOPHILS # BLD AUTO: 0.02 K/UL (ref 0–0.2)
BASOPHILS NFR BLD: 0.4 % (ref 0–1.9)
BILIRUB SERPL-MCNC: 0.3 MG/DL (ref 0.1–1)
BILIRUB UR QL STRIP: NEGATIVE
BUN SERPL-MCNC: 7 MG/DL (ref 6–20)
CALCIUM SERPL-MCNC: 9.8 MG/DL (ref 8.7–10.5)
CHLORIDE SERPL-SCNC: 106 MMOL/L (ref 95–110)
CLARITY UR REFRACT.AUTO: CLEAR
CO2 SERPL-SCNC: 25 MMOL/L (ref 23–29)
COLOR UR AUTO: YELLOW
CORTIS SERPL-MCNC: 10.4 UG/DL
CREAT SERPL-MCNC: 0.7 MG/DL (ref 0.5–1.4)
DIFFERENTIAL METHOD BLD: ABNORMAL
EOSINOPHIL # BLD AUTO: 0.1 K/UL (ref 0–0.5)
EOSINOPHIL NFR BLD: 0.9 % (ref 0–8)
ERYTHROCYTE [DISTWIDTH] IN BLOOD BY AUTOMATED COUNT: 12.1 % (ref 11.5–14.5)
ERYTHROCYTE [SEDIMENTATION RATE] IN BLOOD BY PHOTOMETRIC METHOD: 17 MM/HR (ref 0–36)
EST. GFR  (NO RACE VARIABLE): >60 ML/MIN/1.73 M^2
GLUCOSE SERPL-MCNC: 96 MG/DL (ref 70–110)
GLUCOSE UR QL STRIP: NEGATIVE
HCT VFR BLD AUTO: 35.6 % (ref 37–48.5)
HETEROPH AB SERPL QL IA: NEGATIVE
HGB BLD-MCNC: 11.5 G/DL (ref 12–16)
HGB UR QL STRIP: NEGATIVE
HIV 1+2 AB+HIV1 P24 AG SERPL QL IA: NORMAL
IMM GRANULOCYTES # BLD AUTO: 0.07 K/UL (ref 0–0.04)
IMM GRANULOCYTES NFR BLD AUTO: 1.3 % (ref 0–0.5)
KETONES UR QL STRIP: NEGATIVE
LDH SERPL L TO P-CCNC: 226 U/L (ref 110–260)
LEUKOCYTE ESTERASE UR QL STRIP: NEGATIVE
LYMPHOCYTES # BLD AUTO: 1.9 K/UL (ref 1–4.8)
LYMPHOCYTES NFR BLD: 35.5 % (ref 18–48)
MCH RBC QN AUTO: 30.3 PG (ref 27–31)
MCHC RBC AUTO-ENTMCNC: 32.3 G/DL (ref 32–36)
MCV RBC AUTO: 94 FL (ref 82–98)
MONOCYTES # BLD AUTO: 0.5 K/UL (ref 0.3–1)
MONOCYTES NFR BLD: 10.1 % (ref 4–15)
NEUTROPHILS # BLD AUTO: 2.7 K/UL (ref 1.8–7.7)
NEUTROPHILS NFR BLD: 51.8 % (ref 38–73)
NITRITE UR QL STRIP: NEGATIVE
NRBC BLD-RTO: 0 /100 WBC
PH UR STRIP: 7 [PH] (ref 5–8)
PLATELET # BLD AUTO: 341 K/UL (ref 150–450)
PMV BLD AUTO: 10.4 FL (ref 9.2–12.9)
POTASSIUM SERPL-SCNC: 4.4 MMOL/L (ref 3.5–5.1)
PROT SERPL-MCNC: 7.1 G/DL (ref 6–8.4)
PROT UR QL STRIP: NEGATIVE
RBC # BLD AUTO: 3.8 M/UL (ref 4–5.4)
SODIUM SERPL-SCNC: 142 MMOL/L (ref 136–145)
SP GR UR STRIP: 1.01 (ref 1–1.03)
TSH SERPL DL<=0.005 MIU/L-ACNC: 2.25 UIU/ML (ref 0.4–4)
URATE SERPL-MCNC: 2.4 MG/DL (ref 2.4–5.7)
URN SPEC COLLECT METH UR: NORMAL
WBC # BLD AUTO: 5.19 K/UL (ref 3.9–12.7)

## 2024-08-06 PROCEDURE — 99215 OFFICE O/P EST HI 40 MIN: CPT | Mod: S$GLB,,, | Performed by: EMERGENCY MEDICINE

## 2024-08-06 PROCEDURE — 85652 RBC SED RATE AUTOMATED: CPT | Performed by: EMERGENCY MEDICINE

## 2024-08-06 PROCEDURE — 84443 ASSAY THYROID STIM HORMONE: CPT | Performed by: EMERGENCY MEDICINE

## 2024-08-06 PROCEDURE — 81003 URINALYSIS AUTO W/O SCOPE: CPT | Performed by: EMERGENCY MEDICINE

## 2024-08-06 PROCEDURE — 99999 PR PBB SHADOW E&M-EST. PATIENT-LVL IV: CPT | Mod: PBBFAC,,, | Performed by: EMERGENCY MEDICINE

## 2024-08-06 PROCEDURE — 87389 HIV-1 AG W/HIV-1&-2 AB AG IA: CPT | Performed by: EMERGENCY MEDICINE

## 2024-08-06 PROCEDURE — 82533 TOTAL CORTISOL: CPT | Performed by: EMERGENCY MEDICINE

## 2024-08-06 PROCEDURE — 99999 PR PBB SHADOW E&M-EST. PATIENT-LVL I: CPT | Mod: PBBFAC,,,

## 2024-08-06 PROCEDURE — 83615 LACTATE (LD) (LDH) ENZYME: CPT | Performed by: EMERGENCY MEDICINE

## 2024-08-06 PROCEDURE — 84550 ASSAY OF BLOOD/URIC ACID: CPT | Performed by: EMERGENCY MEDICINE

## 2024-08-06 PROCEDURE — 80053 COMPREHEN METABOLIC PANEL: CPT | Performed by: EMERGENCY MEDICINE

## 2024-08-06 PROCEDURE — 85025 COMPLETE CBC W/AUTO DIFF WBC: CPT | Performed by: EMERGENCY MEDICINE

## 2024-08-06 PROCEDURE — 86308 HETEROPHILE ANTIBODY SCREEN: CPT | Performed by: EMERGENCY MEDICINE

## 2024-08-06 RX ORDER — HYDROXYZINE HYDROCHLORIDE 25 MG/1
50 TABLET, FILM COATED ORAL 3 TIMES DAILY PRN
Qty: 60 TABLET | Refills: 5 | Status: SHIPPED | OUTPATIENT
Start: 2024-08-06

## 2024-08-07 ENCOUNTER — OFFICE VISIT (OUTPATIENT)
Dept: NEUROLOGY | Facility: CLINIC | Age: 39
End: 2024-08-07
Payer: COMMERCIAL

## 2024-08-07 VITALS — SYSTOLIC BLOOD PRESSURE: 95 MMHG | HEART RATE: 63 BPM | DIASTOLIC BLOOD PRESSURE: 71 MMHG

## 2024-08-07 DIAGNOSIS — R53.83 FATIGUE DUE TO SLEEP PATTERN DISTURBANCE: ICD-10-CM

## 2024-08-07 DIAGNOSIS — G47.9 FATIGUE DUE TO SLEEP PATTERN DISTURBANCE: ICD-10-CM

## 2024-08-07 DIAGNOSIS — M54.2 CERVICALGIA OF OCCIPITO-ATLANTO-AXIAL REGION: ICD-10-CM

## 2024-08-07 DIAGNOSIS — M54.81 OCCIPITAL NEURITIS: ICD-10-CM

## 2024-08-07 DIAGNOSIS — S06.0X9S CONCUSSION WITH LOSS OF CONSCIOUSNESS, SEQUELA: Primary | ICD-10-CM

## 2024-08-07 DIAGNOSIS — M54.81 CERVICO-OCCIPITAL NEURALGIA: ICD-10-CM

## 2024-08-07 DIAGNOSIS — R26.89 IMBALANCE: ICD-10-CM

## 2024-08-07 DIAGNOSIS — S13.4XXS WHIPLASH INJURIES, SEQUELA: ICD-10-CM

## 2024-08-07 DIAGNOSIS — F34.89 OTHER SPECIFIED PERSISTENT MOOD DISORDERS: ICD-10-CM

## 2024-08-07 DIAGNOSIS — H51.11 CONVERGENCE INSUFFICIENCY: ICD-10-CM

## 2024-08-07 DIAGNOSIS — R41.89 COGNITIVE CHANGES: ICD-10-CM

## 2024-08-07 PROCEDURE — 99999 PR PBB SHADOW E&M-EST. PATIENT-LVL IV: CPT | Mod: PBBFAC,,, | Performed by: STUDENT IN AN ORGANIZED HEALTH CARE EDUCATION/TRAINING PROGRAM

## 2024-08-07 RX ORDER — GABAPENTIN 300 MG/1
300 CAPSULE ORAL 2 TIMES DAILY
Qty: 180 CAPSULE | Refills: 2 | Status: SHIPPED | OUTPATIENT
Start: 2024-08-07 | End: 2025-08-07

## 2024-08-08 RX ORDER — PANTOPRAZOLE SODIUM 40 MG/1
40 TABLET, DELAYED RELEASE ORAL DAILY
Qty: 30 TABLET | Refills: 11 | Status: SHIPPED | OUTPATIENT
Start: 2024-08-08 | End: 2025-08-08

## 2024-08-09 ENCOUNTER — CLINICAL SUPPORT (OUTPATIENT)
Dept: REHABILITATION | Facility: HOSPITAL | Age: 39
End: 2024-08-09
Attending: STUDENT IN AN ORGANIZED HEALTH CARE EDUCATION/TRAINING PROGRAM
Payer: COMMERCIAL

## 2024-08-09 ENCOUNTER — OFFICE VISIT (OUTPATIENT)
Dept: INTERNAL MEDICINE | Facility: CLINIC | Age: 39
End: 2024-08-09
Payer: COMMERCIAL

## 2024-08-09 DIAGNOSIS — H53.10 EYE FATIGUE: ICD-10-CM

## 2024-08-09 DIAGNOSIS — H51.11 CONVERGENCE INSUFFICIENCY: ICD-10-CM

## 2024-08-09 DIAGNOSIS — R26.89 IMPAIRMENT OF BALANCE: Primary | ICD-10-CM

## 2024-08-09 DIAGNOSIS — F40.9 FEAR ASSOCIATED WITH ILLNESS AND BODY FUNCTION: Primary | ICD-10-CM

## 2024-08-09 DIAGNOSIS — Z71.2 ENCOUNTER TO DISCUSS TEST RESULTS: ICD-10-CM

## 2024-08-09 PROCEDURE — 97162 PT EVAL MOD COMPLEX 30 MIN: CPT | Mod: PO

## 2024-08-12 ENCOUNTER — HOSPITAL ENCOUNTER (OUTPATIENT)
Dept: CARDIOLOGY | Facility: HOSPITAL | Age: 39
Discharge: HOME | End: 2024-08-12
Attending: INTERNAL MEDICINE
Payer: COMMERCIAL

## 2024-08-12 VITALS
BODY MASS INDEX: 19.62 KG/M2 | HEART RATE: 60 BPM | SYSTOLIC BLOOD PRESSURE: 108 MMHG | HEIGHT: 67 IN | DIASTOLIC BLOOD PRESSURE: 62 MMHG | WEIGHT: 125 LBS

## 2024-08-12 DIAGNOSIS — R55 VASOVAGAL SYNCOPE: ICD-10-CM

## 2024-08-12 LAB
ASCENDING AORTA: 2.45 CM
AV INDEX (PROSTH): 0.85
AV MEAN GRADIENT: 3 MMHG
AV PEAK GRADIENT: 5 MMHG
AV VALVE AREA BY VELOCITY RATIO: 3.09 CM²
AV VALVE AREA: 2.89 CM²
AV VELOCITY RATIO: 0.91
BSA FOR ECHO PROCEDURE: 1.64 M2
CV ECHO LV RWT: 0.35 CM
DOP CALC AO PEAK VEL: 1.1 M/S
DOP CALC AO VTI: 24.09 CM
DOP CALC LVOT AREA: 3.4 CM2
DOP CALC LVOT DIAMETER: 2.08 CM
DOP CALC LVOT PEAK VEL: 1 M/S
DOP CALC LVOT STROKE VOLUME: 69.55 CM3
DOP CALCLVOT PEAK VEL VTI: 20.48 CM
E WAVE DECELERATION TIME: 326.63 MSEC
E/A RATIO: 1.92
E/E' RATIO: 4.3 M/S
ECHO LV POSTERIOR WALL: 0.79 CM (ref 0.6–1.1)
FRACTIONAL SHORTENING: 32 % (ref 28–44)
INTERVENTRICULAR SEPTUM: 0.83 CM (ref 0.6–1.1)
LA MAJOR: 4.85 CM
LA MINOR: 4.69 CM
LA WIDTH: 4.04 CM
LEFT ATRIUM SIZE: 3.53 CM
LEFT ATRIUM VOLUME INDEX MOD: 32.1 ML/M2
LEFT ATRIUM VOLUME INDEX: 34.8 ML/M2
LEFT ATRIUM VOLUME MOD: 53.24 CM3
LEFT ATRIUM VOLUME: 57.81 CM3
LEFT INTERNAL DIMENSION IN SYSTOLE: 3.07 CM (ref 2.1–4)
LEFT VENTRICLE DIASTOLIC VOLUME INDEX: 55.95 ML/M2
LEFT VENTRICLE DIASTOLIC VOLUME: 92.87 ML
LEFT VENTRICLE MASS INDEX: 70 G/M2
LEFT VENTRICLE SYSTOLIC VOLUME INDEX: 22.4 ML/M2
LEFT VENTRICLE SYSTOLIC VOLUME: 37.12 ML
LEFT VENTRICULAR INTERNAL DIMENSION IN DIASTOLE: 4.51 CM (ref 3.5–6)
LEFT VENTRICULAR MASS: 115.93 G
LV LATERAL E/E' RATIO: 3.55 M/S
LV SEPTAL E/E' RATIO: 5.46 M/S
MV PEAK A VEL: 0.37 M/S
MV PEAK E VEL: 0.71 M/S
MV STENOSIS PRESSURE HALF TIME: 94.72 MS
MV VALVE AREA P 1/2 METHOD: 2.32 CM2
PISA TR MAX VEL: 2.14 M/S
RA MAJOR: 4.1 CM
RA PRESSURE ESTIMATED: 3 MMHG
RA WIDTH: 3.73 CM
RIGHT VENTRICLE DIASTOLIC BASEL DIMENSION: 3.4 CM
RV TB RVSP: 5 MMHG
SINUS: 2.95 CM
STJ: 2.67 CM
TDI LATERAL: 0.2 M/S
TDI SEPTAL: 0.13 M/S
TDI: 0.17 M/S
TR MAX PG: 18 MMHG
TRICUSPID ANNULAR PLANE SYSTOLIC EXCURSION: 2.5 CM
TV REST PULMONARY ARTERY PRESSURE: 21 MMHG
Z-SCORE OF LEFT VENTRICULAR DIMENSION IN END DIASTOLE: -0.31
Z-SCORE OF LEFT VENTRICULAR DIMENSION IN END SYSTOLE: 0.5

## 2024-08-12 PROCEDURE — 93306 TTE W/DOPPLER COMPLETE: CPT | Mod: 26,,, | Performed by: INTERNAL MEDICINE

## 2024-08-12 PROCEDURE — 93306 TTE W/DOPPLER COMPLETE: CPT

## 2024-08-14 NOTE — PROGRESS NOTES
Established Patient - Audio Only Telehealth Visit     The patient location is: home  The chief complaint leading to consultation is: sick  Visit type: Virtual visit with audio only (telephone)  Total time spent with patient: 30       The reason for the audio only service rather than synchronous audio and video virtual visit was related to technical difficulties or patient preference/necessity.     Each patient to whom I provide medical services by telemedicine is:  (1) informed of the relationship between the physician and patient and the respective role of any other health care provider with respect to management of the patient; and (2) notified that they may decline to receive medical services by telemedicine and may withdraw from such care at any time. Patient verbally consented to receive this service via voice-only telephone call.       HPI: 39 y.o.  female PMHx anxiety on hydroxyzine and fluoxetine, asthma, B12 deficiency anemia on cyanocobalamin injection, central serous retinopathy, esophageal stricture status post dilatation on pantoprazole presents to e discuss test results.     MDM  Reviewed: previous chart, nursing note and vitals  Reviewed previous: ECG, labs and CT scan (CT head, maxillofacial, CTA chest shows no acute abnormalities.)  Interpretation: labs (mildly elevated chol, Anemia otherwise relatively unremarkable Lipid Panel, CMP, CBC, TSH, HIV, Hep C ab, cortisol, uric acid, lactate dehydrogenase, urinalysis, sed rate, Monospot)     Assessment and plan:       1. Encounter to discuss test results        MRI of brain and cervical spine scheduled for August 17, 2024  The results of physical exam findings, labs, and previous imaging were reviewed with the patient. Management of above assessments/visit diagnoses was discussed with patient. Precautions for return discussed at length. Patient was given ample time for questions. All questions asked and answered to the satisfaction of the patient.  Patient is in agreement with the above and verbalized understanding.   Time spent decreasing fears.

## 2024-08-17 ENCOUNTER — HOSPITAL ENCOUNTER (OUTPATIENT)
Dept: RADIOLOGY | Facility: HOSPITAL | Age: 39
Discharge: HOME | End: 2024-08-17
Attending: EMERGENCY MEDICINE
Payer: COMMERCIAL

## 2024-08-17 DIAGNOSIS — S06.0X9S CONCUSSION WITH LOSS OF CONSCIOUSNESS, SEQUELA: ICD-10-CM

## 2024-08-17 DIAGNOSIS — M54.2 CERVICALGIA: ICD-10-CM

## 2024-08-17 PROCEDURE — 70551 MRI BRAIN STEM W/O DYE: CPT | Mod: TC

## 2024-08-17 PROCEDURE — 72141 MRI NECK SPINE W/O DYE: CPT | Mod: TC

## 2024-08-18 ENCOUNTER — PATIENT MESSAGE (OUTPATIENT)
Dept: INTERNAL MEDICINE | Facility: CLINIC | Age: 39
End: 2024-08-18
Payer: COMMERCIAL

## 2024-08-20 NOTE — PROGRESS NOTES
[facility]      TODAY'S Date 8/21/2024  Patient ID: Elizabeth Frankel is a 39 y.o. female   MRN: [id number]  Primary Care Physician (PCP):  José Prince MD    SUBJECTIVE     Chief Complaint:   Chief Complaint   Patient presents with    Follow-up      HPI:   Reviewed medical, surgical, social and family history, medications, appropriate preventive health screenings, as well as vaccination history. Updates as noted below or in assessment and plan.      39 y.o.  female with PMHx Anxiety, esophageal stricture presents with generalized abdominal pain x1 week.  Symptoms are intermittent.  Patient states that it is associated with nausea and loose stool.  Stool appears to be brown.  Patient states that she has trouble absorbing things like B12.  Symptoms worse when she attempts to eat.  I also just had an episode where I ate a big meal, stood up and felt like I was going to pass out again. I recognized the feeling and sat down where I was standing immediately, and was able to prevent it but still feel out of it.  She reports having her menstrual cycle approximately 2 weeks ago and has not had intercourse since.    There is no report of f/c, CP, SOB, weakness, slurred speech or eye problems.    Immunization History   Administered Date(s) Administered    Tdap 02/09/2021     Past Medical History:   Diagnosis Date    Anxiety     Asthma     B12 deficiency anemia     Central serous retinopathy     Esophageal stricture     Hiatal hernia      Past Surgical History:   Procedure Laterality Date    WRIST SURGERY Bilateral     ligament repair     Family History   Problem Relation Name Age of Onset    Cancer Mother Idalmis         HPV anal cancer    Depression Mother Idalmis     Macular degeneration Mother Idalmis     Early death Mother Idalmis     Asthma Father Randy Frankel     No Known Problems Sister half     No Known Problems Sister half     Lung cancer Maternal Grandmother  Tahmina     Breast cancer Maternal Grandmother Tahmina 40 - 49    Cancer Maternal Grandmother Tahmina     No Known Problems Maternal Grandfather      No Known Problems Paternal Grandmother      No Known Problems Paternal Grandfather       Social History     Tobacco Use    Smoking status: Former     Current packs/day: 0.50     Average packs/day: 0.5 packs/day for 7.0 years (3.5 ttl pk-yrs)     Types: Cigarettes     Passive exposure: Never    Smokeless tobacco: Never    Tobacco comments:     Quit 12 years ago   Substance Use Topics    Alcohol use: Yes     Alcohol/week: 5.0 standard drinks of alcohol     Types: 5 Glasses of wine per week     Comment: socially    Drug use: Yes     Types: Marijuana     Comment: Rarely     Past Medical, Surgical and Social history reviewed and verified by me.     Review of patient's allergies indicates:  No Known Allergies  Current Outpatient Medications on File Prior to Visit   Medication Sig Dispense Refill    albuterol sulfate 90 mcg/actuation aebs Inhale 180 mcg into the lungs every 4 to 6 hours as needed (wheezing).      cyanocobalamin 1,000 mcg/mL injection INJECT 1,000 MCG AS DIRECTED EVERY 14 (FOURTEEN) DAYS. 6 mL 2    FLUoxetine 20 MG capsule Take 1 capsule (20 mg total) by mouth once daily. 90 capsule 1    gabapentin (NEURONTIN) 300 MG capsule Take 1 capsule (300 mg total) by mouth 2 (two) times daily. 180 capsule 2    hydrOXYzine HCL (ATARAX) 25 MG tablet Take 2 tablets (50 mg total) by mouth 3 (three) times daily as needed for Anxiety. 60 tablet 5    pantoprazole (PROTONIX) 40 MG tablet Take 1 tablet (40 mg total) by mouth once daily. 30 tablet 11     No current facility-administered medications on file prior to visit.       Review of Systems as per HPI  Review of Systems   Constitutional:  Positive for malaise/fatigue and weight loss (30 lb in the last year). Negative for chills, diaphoresis and fever.   HENT: Negative.     Eyes: Negative.    Respiratory: Negative.     Cardiovascular:   Negative for chest pain, palpitations, orthopnea, claudication, leg swelling and PND.   Gastrointestinal:  Positive for heartburn and nausea. Negative for blood in stool and melena.   Musculoskeletal:  Positive for neck pain (Since fall that occurred a few weeks ago).   Skin: Negative.    Neurological:  Positive for dizziness and headaches. Negative for tingling, tremors, sensory change, speech change, focal weakness and seizures.   All other systems reviewed and are negative.      OBJECTIVE     PHYSICAL EXAM  Vitals:    08/21/24 1353   BP: (!) 89/41   Pulse: 72   SpO2: 99%   Weight: 57.7 kg (127 lb 5.1 oz)     Vital Signs (Most Recent):  Pulse: 72 (08/21/24 1353)  BP: (!) 89/41 (08/21/24 1353)  SpO2: 99 % (08/21/24 1353)   Weight:   Wt Readings from Last 1 Encounters:   08/21/24 57.7 kg (127 lb 5.1 oz)     Body mass index is 19.94 kg/m².      Vital signs and nursing assessment noted:  Low blood pressure repeated during my interview to show a systolic in the 110s/80s    GENERAL: NAD, alert and O x 3, well-developed, well-nourished  HEENT: Head is normocephalic and atraumatic. Extraocular muscles are intact. Pupils are equal, round, and reactive to light and accommodation. Nares appeared normal. Mouth is without lesions. Mucous membranes are moist. no nystagmus  LUNGS: equal and bilateral chest rise, no obvious wheezing,  no respiratory distress  CV:  no obvious JVD  Abdominal:      General: Abdomen is flat. Bowel sounds are normal. There is no distension or abdominal bruit. There are no signs of injury.      Palpations: Abdomen is soft. There is no shifting dullness, fluid wave, hepatomegaly, splenomegaly, mass or pulsatile mass.      Tenderness: There is no abdominal tenderness. There is no right CVA tenderness, left CVA tenderness, guarding or rebound. Negative signs include Lopez's sign, Rovsing's sign, McBurney's sign, psoas sign and obturator sign.      Hernia: No hernia is present.   BACK: FROM, neg  SLR  EXTREMITIES: BLANDON x 4, FROM, no obvious swelling, no pedal edema  SKIN: warm, dry, intact, no rash/lesions, no pallor  NEURO:    GCS 15, CN II-XII grossly intact, no obvious motor/sensory deficit, no tremor, negative Romberg,  nl gait/coordination  PSYCH: nl attitude, depressed mood, normal affect, nl speech, nl thought process/form, no SI/HI, no AH/VH      Tests      Results for orders placed or performed during the hospital encounter of 07/20/24   EKG 12-lead    Collection Time: 07/20/24  7:24 PM   Result Value Ref Range    QRS Duration 86 ms    [facility] QTC Calculation 465 ms    Narrative    Test Reason : R55,    Vent. Rate : 065 BPM     Atrial Rate : 065 BPM     P-R Int : 140 ms          QRS Dur : 086 ms      QT Int : 448 ms       P-R-T Axes : 071 088 076 degrees     QTc Int : 465 ms    Normal sinus rhythm  Normal ECG    Confirmed by Herb ELI, David LINARES ([id number]) on 7/21/2024 1:01:24 PM    Referred By: [email or web address]   SELF           Confirmed By:David Estevez MD      Labs reviewed and independently interpreted. Imaging studies reviewed.    Latest Reference Range & Units 08/21/24 15:04   WBC 3.90 - 12.70 K/uL 7.61   RBC 4.00 - 5.40 M/uL 3.66 (L)   Hemoglobin 12.0 - 16.0 g/dL 11.1 (L)   Hematocrit 37.0 - 48.5 % 34.2 (L)   MCV 82 - 98 fL 93   MCH 27.0 - 31.0 pg 30.3   MCHC 32.0 - 36.0 g/dL 32.5   RDW 11.5 - 14.5 % 12.7   Platelet Count 150 - 450 K/uL 245   MPV 9.2 - 12.9 fL 11.3   Gran % 38.0 - 73.0 % 75.8 (H)   Lymph % 18.0 - 48.0 % 18.7   Mono % 4.0 - 15.0 % 4.5   Eos % 0.0 - 8.0 % 0.3   Basophil % 0.0 - 1.9 % 0.4   Immature Granulocytes 0.0 - 0.5 % 0.3   Gran # (ANC) 1.8 - 7.7 K/uL 5.8   Lymph # 1.0 - 4.8 K/uL 1.4   Mono # 0.3 - 1.0 K/uL 0.3   Eos # 0.0 - 0.5 K/uL 0.0   Baso # 0.00 - 0.20 K/uL 0.03   Immature Grans (Abs) 0.00 - 0.04 K/uL 0.02   nRBC 0 /100 WBC 0   Differential Method  Automated   Sodium 136 - 145 mmol/L 139   Potassium 3.5 - 5.1 mmol/L 3.8   Chloride 95 - 110 mmol/L 107   CO2 23 - 29 mmol/L 27   Anion  Gap 8 - 16 mmol/L 5 (L)   BUN 6 - 20 mg/dL 8   Creatinine 0.5 - 1.4 mg/dL 0.7   eGFR >60 mL/min/1.73 m^2 >60.0   Glucose 70 - 110 mg/dL 82   Calcium 8.7 - 10.5 mg/dL 9.4   ALP 55 - 135 U/L 55   PROTEIN TOTAL 6.0 - 8.4 g/dL 6.8   Albumin 3.5 - 5.2 g/dL 4.2   BILIRUBIN TOTAL 0.1 - 1.0 mg/dL 0.4   AST 10 - 40 U/L 12   ALT 10 - 44 U/L 7 (L)   Cortisol ug/dL 6.00   Beta HCG Quant See Text mIU/mL <2.4     CT ABDOMEN PELVIS WITH IV CONTRAST August 2024 Impression:   No definite findings to explain reported acute abdominal pain.  2.5 cm hypoattenuating lesion in the right hepatic lobe with punctate enhancing components.  Nonspecific, possibly represents hemangioma.        ASSESSMENT:     1. Abdominal pain, unspecified abdominal location    2. Migraine without status migrainosus, not intractable, unspecified migraine type      39 y.o.  female PMHx  anxiety on hydroxyzine and fluoxetine, asthma, B12 deficiency anemia on cyanocobalamin injection, central serous retinopathy, esophageal stricture status post dilatation on pantoprazole presents with intermittent abdominal pain, nausea, and loose stools for one week, which worsen with eating. She recently experienced near-fainting after a large meal and has had difficulty absorbing nutrients like B12, but denies fever, chest pain, or other concerning symptoms.    PLAN:     Orders Placed This Encounter   Procedures    CT Abdomen Pelvis With IV Contrast Routine Oral Contrast    CORTISOL, RANDOM    COMPREHENSIVE METABOLIC PANEL    CBC W/ AUTO DIFFERENTIAL    HCG, QUANTITATIVE, PREGNANCY       New Prescriptions    ACETAMINOPHEN (TYLENOL) 500 MG TABLET    Take 2 tablets (1,000 mg total) by mouth every 6 (six) hours as needed for Pain (fever).    LIDOCAINE (LIDODERM) 5 %    Place 1 patch onto the skin once daily. Remove & Discard patch within 12 hours or as directed by MD    NALTREXONE 1 MG TABLET    Take 1 tablet (1 mg total) by mouth every evening.       The results of physical exam  findings, labs such as anemia with normal cortisol level, and imaging were reviewed with the patient. Management of above assessments/visit diagnoses was discussed with patient. Precautions for return discussed at length. Patient was given ample time for questions. All questions asked and answered to the satisfaction of the patient. Patient is in agreement with the above and verbalized understanding. Consider further evaluation with dedicated contrast enhanced MRI abdomen if patient amenable. Total time spent caring for the patient today was 51 minutes. This includes time spent before the visit reviewing the chart, time spent during the visit, and time spent after the visit on documentation.    José Prince MD  [facility]    Disclaimer: This document was created using voice recognition software (M*Modal Fluency Direct). Although it may be edited, this document may contain errors related to incorrect recognition of the spoken word. Please contact the physician if clarification is needed.

## 2024-08-21 ENCOUNTER — OFFICE VISIT (OUTPATIENT)
Dept: INTERNAL MEDICINE | Facility: CLINIC | Age: 39
End: 2024-08-21
Payer: COMMERCIAL

## 2024-08-21 ENCOUNTER — CLINICAL SUPPORT (OUTPATIENT)
Dept: INTERNAL MEDICINE | Facility: CLINIC | Age: 39
End: 2024-08-21
Payer: COMMERCIAL

## 2024-08-21 VITALS
BODY MASS INDEX: 19.94 KG/M2 | OXYGEN SATURATION: 99 % | DIASTOLIC BLOOD PRESSURE: 41 MMHG | WEIGHT: 127.31 LBS | HEART RATE: 72 BPM | SYSTOLIC BLOOD PRESSURE: 89 MMHG

## 2024-08-21 DIAGNOSIS — G43.909 MIGRAINE WITHOUT STATUS MIGRAINOSUS, NOT INTRACTABLE, UNSPECIFIED MIGRAINE TYPE: ICD-10-CM

## 2024-08-21 DIAGNOSIS — R10.9 ABDOMINAL PAIN, UNSPECIFIED ABDOMINAL LOCATION: ICD-10-CM

## 2024-08-21 DIAGNOSIS — R10.9 ABDOMINAL PAIN, UNSPECIFIED ABDOMINAL LOCATION: Primary | ICD-10-CM

## 2024-08-21 LAB
ALBUMIN SERPL BCP-MCNC: 4.2 G/DL (ref 3.5–5.2)
ALP SERPL-CCNC: 55 U/L (ref 55–135)
ALT SERPL W/O P-5'-P-CCNC: 7 U/L (ref 10–44)
ANION GAP SERPL CALC-SCNC: 5 MMOL/L (ref 8–16)
AST SERPL-CCNC: 12 U/L (ref 10–40)
BASOPHILS # BLD AUTO: 0.03 K/UL (ref 0–0.2)
BASOPHILS NFR BLD: 0.4 % (ref 0–1.9)
BILIRUB SERPL-MCNC: 0.4 MG/DL (ref 0.1–1)
BUN SERPL-MCNC: 8 MG/DL (ref 6–20)
CALCIUM SERPL-MCNC: 9.4 MG/DL (ref 8.7–10.5)
CHLORIDE SERPL-SCNC: 107 MMOL/L (ref 95–110)
CO2 SERPL-SCNC: 27 MMOL/L (ref 23–29)
CORTIS SERPL-MCNC: 6 UG/DL
CREAT SERPL-MCNC: 0.7 MG/DL (ref 0.5–1.4)
DIFFERENTIAL METHOD BLD: ABNORMAL
EOSINOPHIL # BLD AUTO: 0 K/UL (ref 0–0.5)
EOSINOPHIL NFR BLD: 0.3 % (ref 0–8)
ERYTHROCYTE [DISTWIDTH] IN BLOOD BY AUTOMATED COUNT: 12.7 % (ref 11.5–14.5)
EST. GFR  (NO RACE VARIABLE): >60 ML/MIN/1.73 M^2
GLUCOSE SERPL-MCNC: 82 MG/DL (ref 70–110)
HCG INTACT+B SERPL-ACNC: <2.4 MIU/ML
HCT VFR BLD AUTO: 34.2 % (ref 37–48.5)
HGB BLD-MCNC: 11.1 G/DL (ref 12–16)
IMM GRANULOCYTES # BLD AUTO: 0.02 K/UL (ref 0–0.04)
IMM GRANULOCYTES NFR BLD AUTO: 0.3 % (ref 0–0.5)
LYMPHOCYTES # BLD AUTO: 1.4 K/UL (ref 1–4.8)
LYMPHOCYTES NFR BLD: 18.7 % (ref 18–48)
MCH RBC QN AUTO: 30.3 PG (ref 27–31)
MCHC RBC AUTO-ENTMCNC: 32.5 G/DL (ref 32–36)
MCV RBC AUTO: 93 FL (ref 82–98)
MONOCYTES # BLD AUTO: 0.3 K/UL (ref 0.3–1)
MONOCYTES NFR BLD: 4.5 % (ref 4–15)
NEUTROPHILS # BLD AUTO: 5.8 K/UL (ref 1.8–7.7)
NEUTROPHILS NFR BLD: 75.8 % (ref 38–73)
NRBC BLD-RTO: 0 /100 WBC
PLATELET # BLD AUTO: 245 K/UL (ref 150–450)
PMV BLD AUTO: 11.3 FL (ref 9.2–12.9)
POTASSIUM SERPL-SCNC: 3.8 MMOL/L (ref 3.5–5.1)
PROT SERPL-MCNC: 6.8 G/DL (ref 6–8.4)
RBC # BLD AUTO: 3.66 M/UL (ref 4–5.4)
SODIUM SERPL-SCNC: 139 MMOL/L (ref 136–145)
WBC # BLD AUTO: 7.61 K/UL (ref 3.9–12.7)

## 2024-08-21 PROCEDURE — 80053 COMPREHEN METABOLIC PANEL: CPT | Performed by: EMERGENCY MEDICINE

## 2024-08-21 PROCEDURE — 84702 CHORIONIC GONADOTROPIN TEST: CPT | Performed by: EMERGENCY MEDICINE

## 2024-08-21 PROCEDURE — 99999 PR PBB SHADOW E&M-EST. PATIENT-LVL I: CPT | Mod: PBBFAC,,,

## 2024-08-21 PROCEDURE — 99999 PR PBB SHADOW E&M-EST. PATIENT-LVL III: CPT | Mod: PBBFAC,,, | Performed by: EMERGENCY MEDICINE

## 2024-08-21 PROCEDURE — 85025 COMPLETE CBC W/AUTO DIFF WBC: CPT | Performed by: EMERGENCY MEDICINE

## 2024-08-21 PROCEDURE — 82533 TOTAL CORTISOL: CPT | Performed by: EMERGENCY MEDICINE

## 2024-08-21 PROCEDURE — 99215 OFFICE O/P EST HI 40 MIN: CPT | Mod: S$GLB,,, | Performed by: EMERGENCY MEDICINE

## 2024-08-21 RX ORDER — SUMATRIPTAN 50 MG/1
50 TABLET, FILM COATED ORAL
Qty: 15 TABLET | Refills: 1 | Status: SHIPPED | OUTPATIENT
Start: 2024-08-21

## 2024-08-21 RX ORDER — LIDOCAINE 50 MG/G
1 PATCH TOPICAL DAILY
Qty: 30 PATCH | Refills: 0 | Status: SHIPPED | OUTPATIENT
Start: 2024-08-21 | End: 2025-08-21

## 2024-08-21 RX ORDER — ACETAMINOPHEN 500 MG
1000 TABLET ORAL EVERY 6 HOURS PRN
Qty: 30 TABLET | Refills: 0 | Status: SHIPPED | OUTPATIENT
Start: 2024-08-21

## 2024-08-24 ENCOUNTER — HOSPITAL ENCOUNTER (OUTPATIENT)
Dept: RADIOLOGY | Facility: OTHER | Age: 39
Discharge: HOME | End: 2024-08-24
Attending: EMERGENCY MEDICINE

## 2024-08-24 DIAGNOSIS — R10.9 ABDOMINAL PAIN, UNSPECIFIED ABDOMINAL LOCATION: ICD-10-CM

## 2024-08-24 PROCEDURE — 25500020 PHARM REV CODE 255: Performed by: EMERGENCY MEDICINE

## 2024-08-24 PROCEDURE — A9698 NON-RAD CONTRAST MATERIALNOC: HCPCS | Performed by: EMERGENCY MEDICINE

## 2024-08-24 PROCEDURE — 74177 CT ABD & PELVIS W/CONTRAST: CPT | Mod: TC

## 2024-08-24 RX ADMIN — IOHEXOL 1000 ML: 9 SOLUTION ORAL at 01:08

## 2024-08-24 RX ADMIN — IOHEXOL 75 ML: 350 INJECTION, SOLUTION INTRAVENOUS at 01:08

## 2024-08-27 ENCOUNTER — CLINICAL SUPPORT (OUTPATIENT)
Dept: CARDIOLOGY | Facility: HOSPITAL | Age: 39
End: 2024-08-27
Attending: INTERNAL MEDICINE
Payer: COMMERCIAL

## 2024-08-27 DIAGNOSIS — R55 VASOVAGAL SYNCOPE: ICD-10-CM

## 2024-08-27 PROCEDURE — 93271 ECG/MONITORING AND ANALYSIS: CPT

## 2024-09-05 ENCOUNTER — E-VISIT (OUTPATIENT)
Dept: INTERNAL MEDICINE | Facility: CLINIC | Age: 39
End: 2024-09-05
Payer: COMMERCIAL

## 2024-09-05 ENCOUNTER — PATIENT MESSAGE (OUTPATIENT)
Dept: INTERNAL MEDICINE | Facility: CLINIC | Age: 39
End: 2024-09-05
Payer: COMMERCIAL

## 2024-09-05 DIAGNOSIS — R53.83 FATIGUE, UNSPECIFIED TYPE: Primary | ICD-10-CM

## 2024-09-05 NOTE — PROGRESS NOTES
Patient ID: Elizabeth Frankel is a 39 y.o. female.    Chief Complaint: General Illness (Entered automatically based on patient selection in [facility].)    The patient initiated a request through [facility] on 9/5/2024 for evaluation and management with a chief complaint of General Illness (Entered automatically based on patient selection in [facility].)     I evaluated the questionnaire submission on 9/5/2024  .    [facility] Peq Evisit General    9/5/2024  1:19 PM CDT - Filed by Patient   Do you agree to participate in an E-Visit? Yes   If you have any of the following symptoms, please present to your local emergency room or call 911:  I acknowledge   Choose the state of your primary residence [place]   Are you pregnant, could you be pregnant, or are you breast feeding? None of the above   What is the main issue you would like addressed today? Fatigue   Please describe your symptoms Constant fatigue   Where is your problem located? Everywhere   How severe are your symptoms? Moderate   Have you had these symptoms before? Yes   How long have you been having these symptoms? For more than a month   Please list any medications or treatments you have used for your condition and indicate if it was effective or not. Lifestyle changes recommended by doctor   What makes this feel better? Nothing   What makes this feel worse? Eating   Are these symptoms related to a condition that you currently have? I am not sure   What is the condition?    When were you last seen for this condition?    Please describe any probable cause for these symptoms Low blood pressure   Provide any additional information you feel is important.    Please attach any relevant images or files    Are you able to take your vital signs? No       Tests:   Latest Reference Range & Units 08/06/24 12:25 08/12/24 14:00 08/17/24 16:34 08/21/24 15:04   WBC 3.90 - 12.70 K/uL 5.19   7.61   RBC 4.00 - 5.40 M/uL 3.80 (L)   3.66 (L)   Hemoglobin 12.0 - 16.0 g/dL 11.5 (L)   11.1 (L)    Hematocrit 37.0 - 48.5 % 35.6 (L)   34.2 (L)   MCV 82 - 98 fL 94   93   MCH 27.0 - 31.0 pg 30.3   30.3   MCHC 32.0 - 36.0 g/dL 32.3   32.5   RDW 11.5 - 14.5 % 12.1   12.7   Platelet Count 150 - 450 K/uL 341   245   MPV 9.2 - 12.9 fL 10.4   11.3   Gran % 38.0 - 73.0 % 51.8   75.8 (H)   Lymph % 18.0 - 48.0 % 35.5   18.7   Mono % 4.0 - 15.0 % 10.1   4.5   Eos % 0.0 - 8.0 % 0.9   0.3   Basophil % 0.0 - 1.9 % 0.4   0.4   Immature Granulocytes 0.0 - 0.5 % 1.3 (H)   0.3   Gran # (ANC) 1.8 - 7.7 K/uL 2.7   5.8   Lymph # 1.0 - 4.8 K/uL 1.9   1.4   Mono # 0.3 - 1.0 K/uL 0.5   0.3   Eos # 0.0 - 0.5 K/uL 0.1   0.0   Baso # 0.00 - 0.20 K/uL 0.02   0.03   Immature Grans (Abs) 0.00 - 0.04 K/uL 0.07 (H)   0.02   nRBC 0 /100 WBC 0   0   Differential Method  Automated   Automated   Sed Rate 0 - 36 mm/Hr 17      Sodium 136 - 145 mmol/L 142   139   Potassium 3.5 - 5.1 mmol/L 4.4   3.8   Chloride 95 - 110 mmol/L 106   107   CO2 23 - 29 mmol/L 25   27   Anion Gap 8 - 16 mmol/L 11   5 (L)   BUN 6 - 20 mg/dL 7   8   Creatinine 0.5 - 1.4 mg/dL 0.7   0.7   eGFR >60 mL/min/1.73 m^2 >60.0   >60.0   Glucose 70 - 110 mg/dL 96   82   Calcium 8.7 - 10.5 mg/dL 9.8   9.4   ALP 55 - 135 U/L 59   55   PROTEIN TOTAL 6.0 - 8.4 g/dL 7.1   6.8   Albumin 3.5 - 5.2 g/dL 3.9   4.2   Uric Acid 2.4 - 5.7 mg/dL 2.4      BILIRUBIN TOTAL 0.1 - 1.0 mg/dL 0.3   0.4   AST 10 - 40 U/L 15   12   ALT 10 - 44 U/L 18   7 (L)   Lactate Dehydrogenase 110 - 260 U/L 226      Cortisol ug/dL 10.40   6.00   TSH 0.400 - 4.000 uIU/mL 2.255      Beta HCG Quant See Text mIU/mL    <2.4   HIV 1/2 Ag/Ab Non-reactive  Non-reactive      Mono, Immunochomatopraphic IM Heterophile Antibody Negative  Negative      Specimen UA  Urine, Clean Catch      Color, UA Yellow, Straw, Angelina  Yellow      Appearance, UA Clear  Clear      Spec Grav UA 1.005 - 1.030  1.010      pH, UA 5.0 - 8.0  7.0      Protein, UA Negative  Negative      Glucose, UA Negative  Negative      Ketones, UA Negative   Negative      Blood, UA Negative  Negative      NITRITE UA Negative  Negative      Bilirubin (UA) Negative  Negative      Leukocyte Esterase, UA Negative  Negative      MRI BRAIN WITHOUT CONTRAST    Rpt    MRI CERVICAL SPINE WITHOUT CONTRAST    Rpt    ECHO (CUPID ONLY)   Rpt     Ao peak mitali m/s  1.10     Ao VTI cm  24.09     AV valve area cm²  2.89     GUERLINE by Velocity Ratio cm²  3.09     AV index (prosthetic)   0.85     AV peak gradient mmHg  5     Left Ventricle Relative Wall Thickness cm  0.35     E/E' ratio m/s  4.30     LA WIDTH cm  4.04     LA Volume Index (Mod) mL/m2  32.1     LVOT area cm2  3.4     LV mass g  115.93     MV valve area p 1/2 method cm2  2.32     RV- man basal diam cm  3.4     Triscuspid Valve Regurgitation Peak Gradient mmHg  18     TV resting pulmonary artery pressure mmHg  21       MRI brain: Unremarkable noncontrast MRI brain specifically without evidence for acute infarction or hydrocephalus.  Further evaluation as warranted clinically.     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   2.5 cm hypoattenuating lesion in the right hepatic lobe with punctate enhancing components.  Nonspecific, possibly represents hemangioma.  Consider further evaluation with dedicated contrast enhanced MRI abdomen.   Additional findings as above.     Encounter Diagnosis   Name Primary?    Fatigue, unspecified type Yes        No orders of the defined types were placed in this encounter.     Medications Ordered This Encounter   Medications    natrexone tablet 4 mg     Sig: Take 1 tablet (4 mg total) by mouth every evening.     Dispense:  30 tablet     Refill:  1   Recommend in person  visit and Oral fluid intake should be encouraged to a target of 3 L daily and a daily salt intake of 8 to 12 g of sodium chloride (3.2 to 4.8 g of sodium)     No follow-ups on file.      E-Visit Time Tracking: 15

## 2024-09-26 ENCOUNTER — PATIENT MESSAGE (OUTPATIENT)
Dept: INTERNAL MEDICINE | Facility: CLINIC | Age: 39
End: 2024-09-26
Payer: COMMERCIAL

## 2024-10-29 ENCOUNTER — PATIENT MESSAGE (OUTPATIENT)
Dept: INTERNAL MEDICINE | Facility: CLINIC | Age: 39
End: 2024-10-29
Payer: COMMERCIAL

## 2024-10-30 ENCOUNTER — OFFICE VISIT (OUTPATIENT)
Dept: INTERNAL MEDICINE | Facility: CLINIC | Age: 39
End: 2024-10-30
Payer: COMMERCIAL

## 2024-10-30 DIAGNOSIS — R00.2 INTERMITTENT PALPITATIONS: Primary | ICD-10-CM

## 2024-10-30 PROCEDURE — 99214 OFFICE O/P EST MOD 30 MIN: CPT | Mod: 95,,, | Performed by: EMERGENCY MEDICINE

## 2024-10-30 RX ORDER — ACETAMINOPHEN 500 MG
TABLET ORAL
Qty: 1 EACH | Refills: 0 | Status: SHIPPED | OUTPATIENT
Start: 2024-10-30

## 2024-10-30 NOTE — PROGRESS NOTES
Answers submitted by the patient for this visit:  Review of Systems Questionnaire (Submitted on 10/30/2024)  activity change: No  unexpected weight change: No  neck pain: Yes  hearing loss: No  rhinorrhea: No  trouble swallowing: No  eye discharge: No  visual disturbance: Yes  chest tightness: Yes  wheezing: No  chest pain: Yes  palpitations: Yes  blood in stool: No  constipation: No  vomiting: No  diarrhea: No  polydipsia: No  polyuria: No  difficulty urinating: No  hematuria: No  menstrual problem: No  dysuria: No  joint swelling: No  arthralgias: No  headaches: Yes  weakness: Yes  confusion: No  dysphoric mood: Yes

## 2024-10-30 NOTE — PROGRESS NOTES
Established Patient - Telehealth Visit     The patient location is: home  The chief complaint leading to consultation is: palpitations  Visit type: Virtual visit with audiovisual    Each patient to whom I provide medical services by telemedicine is:  (1) informed of the relationship between the physician and patient and the respective role of any other health care provider with respect to management of the patient; and (2) notified that they may decline to receive medical services by telemedicine and may withdraw from such care at any time. Patient verbally consented to receive this service via voice-only telephone call.       Subjective     Patient ID: Elizabeth Frankel is a 39 y.o. female.    Chief Complaint:   Ms. Frankel presents today for concerns of palpitations and increased sweating.    CARDIOVASCULAR SYMPTOMS:  She reports intermittent lightheadedness and is concerned about potentially low blood pressure, though she does not have a blood pressure cuff at home to confirm. She experienced a loss of consciousness two weeks prior to her visit on August 6, 2024. During that visit, her hemoglobin level was noted to be 11.5.    ANXIETY:  She expresses anxiety about an upcoming work presentation, worried that she may not be able to stand for the full 30-minute duration without needing to lay down or put her head between her knees. This situation is causing her increased anxiety.    MEDICATIONS:  She reports taking low-dose naltrexone for approximately one month and feels relatively better. She is also adherent to fluoxetine.    DIET:  She reports lacking appetite for breakfast, occasionally consuming deli meats or eggs for this meal.  She also denies any hematuria, hematochezia, bloody stools, nosebleeds, or easy bruising.        Review of Systems   Constitutional:  Negative for activity change and unexpected weight change.        Excessive sweatiness   HENT:  Negative for hearing loss, rhinorrhea and trouble  swallowing.    Eyes:  Positive for visual disturbance. Negative for discharge.   Respiratory:  Positive for chest tightness. Negative for wheezing.    Cardiovascular:  Positive for chest pain and palpitations.   Gastrointestinal:  Negative for blood in stool, constipation, diarrhea and vomiting.   Endocrine: Negative for polydipsia and polyuria.   Genitourinary:  Negative for difficulty urinating, dysuria, hematuria and menstrual problem.   Musculoskeletal:  Positive for neck pain. Negative for arthralgias and joint swelling.   Neurological:  Positive for weakness, light-headedness and headaches. Negative for dizziness, tremors, seizures, syncope, facial asymmetry, speech difficulty, numbness, memory loss and coordination difficulties.   Psychiatric/Behavioral:  Positive for dysphoric mood. Negative for confusion. The patient is nervous/anxious.           Objective     Physical Exam  NAD    Tests:   Cardiac event monitor Aug 2024 interpretation   Negative event monitor with no clinical arrhythmias.    Symptoms corresponding with normal sinus rhythm.     Assessment & Plan    Assessed patient's presentation of palpitations, increased sweating, and lightheadedness  Considered possibility of orthostatic intolerance given symptoms and recent syncope  Noted improvement with low-dose naltrexone treatment for approximately 1 month  Evaluated anxiety related to upcoming work presentation  Reviewed current adherence to fluoxetine  Planned blood draw to further investigate underlying causes  Considered tilt table test for further evaluation of orthostatic intolerance    - Ms. Frankel to come in person tomorrow for blood draw    - Continued low-dose naltrexone  - Continued fluoxetine    - Blood draw ordered for tomorrow      Diagnoses and all orders for this visit:    Intermittent palpitations  -     blood pressure test kit-large Kit; Take daily values  -     Tilt table; Future  -     COMPREHENSIVE METABOLIC PANEL; Future  -      Janet-Danlos Syndrome Gene Panel; Future  -     CK; Future  -     TRYPTASE; Future  -     Magnesium; Future  -     PHOSPHORUS; Future  -     Urinalysis, Reflex to Urine Culture Urine, Clean Catch; Future              No follow-ups on file.

## 2024-10-31 ENCOUNTER — TELEPHONE (OUTPATIENT)
Dept: INTERNAL MEDICINE | Facility: CLINIC | Age: 39
End: 2024-10-31
Payer: COMMERCIAL

## 2024-10-31 ENCOUNTER — CLINICAL SUPPORT (OUTPATIENT)
Dept: INTERNAL MEDICINE | Facility: CLINIC | Age: 39
End: 2024-10-31
Payer: COMMERCIAL

## 2024-10-31 ENCOUNTER — TELEPHONE (OUTPATIENT)
Dept: INTERNAL MEDICINE | Facility: CLINIC | Age: 39
End: 2024-10-31

## 2024-10-31 VITALS — DIASTOLIC BLOOD PRESSURE: 65 MMHG | SYSTOLIC BLOOD PRESSURE: 95 MMHG

## 2024-10-31 DIAGNOSIS — R00.2 INTERMITTENT PALPITATIONS: ICD-10-CM

## 2024-10-31 LAB
ALBUMIN SERPL BCP-MCNC: 4.3 G/DL (ref 3.5–5.2)
ALP SERPL-CCNC: 49 U/L (ref 40–150)
ALT SERPL W/O P-5'-P-CCNC: 7 U/L (ref 10–44)
ANION GAP SERPL CALC-SCNC: 7 MMOL/L (ref 8–16)
AST SERPL-CCNC: 13 U/L (ref 10–40)
BILIRUB SERPL-MCNC: 0.7 MG/DL (ref 0.1–1)
BILIRUB UR QL STRIP: NEGATIVE
BUN SERPL-MCNC: 6 MG/DL (ref 6–20)
CALCIUM SERPL-MCNC: 9.5 MG/DL (ref 8.7–10.5)
CHLORIDE SERPL-SCNC: 106 MMOL/L (ref 95–110)
CK SERPL-CCNC: 68 U/L (ref 20–180)
CLARITY UR REFRACT.AUTO: CLEAR
CO2 SERPL-SCNC: 24 MMOL/L (ref 23–29)
COLOR UR AUTO: YELLOW
CREAT SERPL-MCNC: 0.8 MG/DL (ref 0.5–1.4)
EST. GFR  (NO RACE VARIABLE): >60 ML/MIN/1.73 M^2
GLUCOSE SERPL-MCNC: 82 MG/DL (ref 70–110)
GLUCOSE UR QL STRIP: NEGATIVE
HGB UR QL STRIP: NEGATIVE
KETONES UR QL STRIP: NEGATIVE
LEUKOCYTE ESTERASE UR QL STRIP: NEGATIVE
MAGNESIUM SERPL-MCNC: 2.1 MG/DL (ref 1.6–2.6)
NITRITE UR QL STRIP: NEGATIVE
PH UR STRIP: 7 [PH] (ref 5–8)
PHOSPHATE SERPL-MCNC: 3.7 MG/DL (ref 2.7–4.5)
POTASSIUM SERPL-SCNC: 4 MMOL/L (ref 3.5–5.1)
PROT SERPL-MCNC: 7 G/DL (ref 6–8.4)
PROT UR QL STRIP: NEGATIVE
SODIUM SERPL-SCNC: 137 MMOL/L (ref 136–145)
SP GR UR STRIP: 1.01 (ref 1–1.03)
URN SPEC COLLECT METH UR: NORMAL

## 2024-10-31 PROCEDURE — 81003 URINALYSIS AUTO W/O SCOPE: CPT | Performed by: EMERGENCY MEDICINE

## 2024-10-31 PROCEDURE — 36415 COLL VENOUS BLD VENIPUNCTURE: CPT | Performed by: EMERGENCY MEDICINE

## 2024-10-31 PROCEDURE — 81408 MOPATH PROCEDURE LEVEL 9: CPT | Performed by: EMERGENCY MEDICINE

## 2024-10-31 PROCEDURE — 81479 UNLISTED MOLECULAR PATHOLOGY: CPT | Performed by: EMERGENCY MEDICINE

## 2024-10-31 PROCEDURE — 83735 ASSAY OF MAGNESIUM: CPT | Performed by: EMERGENCY MEDICINE

## 2024-10-31 PROCEDURE — 80053 COMPREHEN METABOLIC PANEL: CPT | Performed by: EMERGENCY MEDICINE

## 2024-10-31 PROCEDURE — 83520 IMMUNOASSAY QUANT NOS NONAB: CPT | Performed by: EMERGENCY MEDICINE

## 2024-10-31 PROCEDURE — 84100 ASSAY OF PHOSPHORUS: CPT | Performed by: EMERGENCY MEDICINE

## 2024-10-31 PROCEDURE — 82550 ASSAY OF CK (CPK): CPT | Performed by: EMERGENCY MEDICINE

## 2024-10-31 PROCEDURE — 99999 PR PBB SHADOW E&M-EST. PATIENT-LVL I: CPT | Mod: PBBFAC,,,

## 2024-11-06 ENCOUNTER — PATIENT MESSAGE (OUTPATIENT)
Dept: INTERNAL MEDICINE | Facility: CLINIC | Age: 39
End: 2024-11-06
Payer: COMMERCIAL

## 2024-11-06 DIAGNOSIS — R30.0 DYSURIA: Primary | ICD-10-CM

## 2024-11-07 ENCOUNTER — E-VISIT (OUTPATIENT)
Dept: INTERNAL MEDICINE | Facility: CLINIC | Age: 39
End: 2024-11-07
Payer: COMMERCIAL

## 2024-11-07 DIAGNOSIS — R30.0 DYSURIA: Primary | ICD-10-CM

## 2024-11-07 PROCEDURE — 99499 UNLISTED E&M SERVICE: CPT | Mod: ,,, | Performed by: EMERGENCY MEDICINE

## 2024-11-08 ENCOUNTER — DOCUMENTATION ONLY (OUTPATIENT)
Dept: REHABILITATION | Facility: HOSPITAL | Age: 39
End: 2024-11-08
Payer: COMMERCIAL

## 2024-11-08 NOTE — PROGRESS NOTES
OUTPATIENT PHYSICAL THERAPY DISCHARGE SUMMARY     Name: Elizabeth Frankel  Clinic Number: [id number]    Diagnosis: No diagnosis found.  Physician: Debbie Maza MD  Treatment Orders: PT Eval and Treat  Past Medical History:   Diagnosis Date    Anxiety     Asthma     B12 deficiency anemia     Central serous retinopathy     Esophageal stricture     Hiatal hernia        Initial visit: 8/9/2024  Date of Last visit: 8/9/2024  Date of Discharge Note:  11/08/2024  Total Visits Received: 1    ASSESSMENT   Elizabeth Frankel only presented for 1 PT visit. Su has not attempted to schedule any further appointments. Therefore, PT plan of care is being discharged at this time. Due to such limited PT attendance and unexpected pt self discharge, no progress noted and no final discharge measures were assessed.       Discharge reason : Patient self discharge    PLAN   This patient is discharged from Outpatient Physical Therapy Services.     Mae Price, PT  11/08/2024

## 2024-11-12 ENCOUNTER — LAB VISIT (OUTPATIENT)
Dept: LAB | Facility: OTHER | Age: 39
End: 2024-11-12
Attending: EMERGENCY MEDICINE
Payer: COMMERCIAL

## 2024-11-12 ENCOUNTER — PATIENT MESSAGE (OUTPATIENT)
Dept: INTERNAL MEDICINE | Facility: CLINIC | Age: 39
End: 2024-11-12
Payer: COMMERCIAL

## 2024-11-12 DIAGNOSIS — R30.0 DYSURIA: Primary | ICD-10-CM

## 2024-11-12 DIAGNOSIS — R82.90 ABNORMAL URINE FINDINGS: Primary | ICD-10-CM

## 2024-11-12 DIAGNOSIS — R30.0 DYSURIA: ICD-10-CM

## 2024-11-12 LAB
BACTERIA #/AREA URNS HPF: ABNORMAL /HPF
BILIRUB UR QL STRIP: NEGATIVE
CLARITY UR: ABNORMAL
COLOR UR: COLORLESS
GLUCOSE UR QL STRIP: NEGATIVE
HGB UR QL STRIP: NEGATIVE
KETONES UR QL STRIP: NEGATIVE
LEUKOCYTE ESTERASE UR QL STRIP: ABNORMAL
MICROSCOPIC COMMENT: ABNORMAL
NITRITE UR QL STRIP: NEGATIVE
PH UR STRIP: 7 [PH] (ref 5–8)
PROT UR QL STRIP: NEGATIVE
RBC #/AREA URNS HPF: 2 /HPF (ref 0–4)
SP GR UR STRIP: 1 (ref 1–1.03)
SQUAMOUS #/AREA URNS HPF: 1 /HPF
URN SPEC COLLECT METH UR: ABNORMAL
WBC #/AREA URNS HPF: >100 /HPF (ref 0–5)
WBC CLUMPS URNS QL MICRO: ABNORMAL

## 2024-11-12 PROCEDURE — 87088 URINE BACTERIA CULTURE: CPT | Performed by: EMERGENCY MEDICINE

## 2024-11-12 PROCEDURE — 87086 URINE CULTURE/COLONY COUNT: CPT | Performed by: EMERGENCY MEDICINE

## 2024-11-12 PROCEDURE — 87491 CHLMYD TRACH DNA AMP PROBE: CPT | Performed by: EMERGENCY MEDICINE

## 2024-11-12 PROCEDURE — 81000 URINALYSIS NONAUTO W/SCOPE: CPT | Performed by: EMERGENCY MEDICINE

## 2024-11-12 PROCEDURE — 87186 SC STD MICRODIL/AGAR DIL: CPT | Performed by: EMERGENCY MEDICINE

## 2024-11-12 RX ORDER — CIPROFLOXACIN 500 MG/1
500 TABLET ORAL EVERY 12 HOURS
Qty: 14 TABLET | Refills: 0 | Status: SHIPPED | OUTPATIENT
Start: 2024-11-12 | End: 2024-11-19

## 2024-11-12 NOTE — PROGRESS NOTES
Patient ID: Elizabeth Frankel is a 39 y.o. female.    Chief Complaint: Urinary Tract Infection (Entered automatically based on patient selection in [employer].)    The patient initiated a request through [employer] on 11/7/2024 for evaluation and management with a chief complaint of Urinary Tract Infection (Entered automatically based on patient selection in [employer].)     I evaluated the questionnaire submission on 11/12/2024  .    [facility] Peq Evisit Uti Questionnaire    11/12/2024 11:00 AM CST - Filed by Patient   Do you agree to participate in an E-Visit? Yes   If you have any of the following symptoms, please present to your local emergency room or call 911:  I acknowledge   Choose the state of your primary residence [place]   Select all that apply: None of the above   What is the main issue you would like addressed today? Urinary discomfort   What symptoms do you currently have? Pain while passing urine;  Change in urine appearance or smell   When did your symptoms first appear? 11/7/2024   List what you have done or taken to help your symptoms. NA   Please indicate whether you have had the following symptoms during the past 24 hours     Urgent urination (a sudden and uncontrollable urge to urinate) Mild   Frequent urination of small amounts of urine (going to the toilet very often) Mild   Burning pain when urinating Moderate   Incomplete bladder emptying (still feel like you need to urinate again after urination) Moderate   Pain not associated with urination in the lower abdomen below the belly button) Mild   What does your urine look like? Cloudy   Blood seen in the urine None   Flank pain (pain in one or both sides of the lower back) Mild   Abnormal Vaginal Discharge (abnormal amount, color and/or odor) Moderate   Discharge from the urethra (urinary opening) without urination Mild   High body temperature/fever? None-<99.5   Please rate how much discomfort you have experience because of the symptoms in the past 24  hours: Mild   Please indicate how the symptoms have interfered with your every day activities/work in the past 24 hours: Mild   Please indicate how these symptoms have interfered with your social activities (visiting people, meeting with friends, etc.) in the past 24 hours? Mild   Are you a diabetic? No   Please indicate whether you have the following at the time of completion of this questionnaire: None of the above   Provide any additional information you feel is important.    Please attach any relevant images or files (if you have performed a home test for UTI, please submit a photo of results)    Are you able to take your vital signs? No         Encounter Diagnosis   Name Primary?    Dysuria Yes      UA and GC/chlamydia tests ordered.             No follow-ups on file.      E-Visit Time Tracking: 10 min

## 2024-11-13 LAB
C TRACH DNA SPEC QL NAA+PROBE: NOT DETECTED
N GONORRHOEA DNA SPEC QL NAA+PROBE: NOT DETECTED

## 2024-11-15 ENCOUNTER — PATIENT MESSAGE (OUTPATIENT)
Dept: INTERNAL MEDICINE | Facility: CLINIC | Age: 39
End: 2024-11-15
Payer: COMMERCIAL

## 2024-11-15 DIAGNOSIS — H02.9 LESION OF LEFT UPPER EYELID: Primary | ICD-10-CM

## 2024-11-15 LAB — BACTERIA UR CULT: ABNORMAL

## 2024-11-21 ENCOUNTER — PATIENT MESSAGE (OUTPATIENT)
Dept: INTERNAL MEDICINE | Facility: CLINIC | Age: 39
End: 2024-11-21
Payer: COMMERCIAL

## 2024-11-21 DIAGNOSIS — Z71.2 ENCOUNTER TO DISCUSS TEST RESULTS: Primary | ICD-10-CM

## 2024-12-28 ENCOUNTER — OFFICE VISIT (OUTPATIENT)
Dept: URGENT CARE | Facility: CLINIC | Age: 39
End: 2024-12-28
Payer: COMMERCIAL

## 2024-12-28 VITALS
DIASTOLIC BLOOD PRESSURE: 70 MMHG | OXYGEN SATURATION: 98 % | TEMPERATURE: 98 F | RESPIRATION RATE: 18 BRPM | HEIGHT: 67 IN | BODY MASS INDEX: 19.15 KG/M2 | HEART RATE: 78 BPM | WEIGHT: 122 LBS | SYSTOLIC BLOOD PRESSURE: 102 MMHG

## 2024-12-28 DIAGNOSIS — J06.9 ACUTE URI: ICD-10-CM

## 2024-12-28 DIAGNOSIS — Z76.0 ENCOUNTER FOR MEDICATION REFILL: ICD-10-CM

## 2024-12-28 DIAGNOSIS — J20.8 ACUTE VIRAL BRONCHITIS: ICD-10-CM

## 2024-12-28 DIAGNOSIS — J02.9 SORE THROAT: Primary | ICD-10-CM

## 2024-12-28 LAB
CTP QC/QA: YES
MOLECULAR STREP A: NEGATIVE

## 2024-12-28 PROCEDURE — 99214 OFFICE O/P EST MOD 30 MIN: CPT | Mod: S$GLB,,, | Performed by: FAMILY MEDICINE

## 2024-12-28 PROCEDURE — 87651 STREP A DNA AMP PROBE: CPT | Mod: QW,S$GLB,, | Performed by: FAMILY MEDICINE

## 2024-12-28 RX ORDER — BENZONATATE 200 MG/1
200 CAPSULE ORAL 3 TIMES DAILY PRN
Qty: 21 CAPSULE | Refills: 0 | Status: SHIPPED | OUTPATIENT
Start: 2024-12-28 | End: 2025-01-04

## 2024-12-28 RX ORDER — ALBUTEROL SULFATE 90 UG/1
2 INHALANT RESPIRATORY (INHALATION) EVERY 6 HOURS PRN
Qty: 18 G | Refills: 0 | Status: SHIPPED | OUTPATIENT
Start: 2024-12-28

## 2024-12-28 NOTE — PROGRESS NOTES
"Subjective:      Patient ID: Elizabeth Frankel is a 39 y.o. female.    Vitals:  height is 5' 7" (1.702 m) and weight is 55.3 kg (122 lb). Her oral temperature is 98.3 °F (36.8 °C). Her blood pressure is 102/70 and her pulse is 78. Her respiration is 18 and oxygen saturation is 98%.     Chief Complaint: Sore Throat    Pt presents complaints of a sore throat and wheezing. Symptoms started 3-4 days ago. Unchanged. Pt states entire throat with pain swallowing. Pain level 6. Pt states wheezing comes and goes.  Patient denies fever, chills, chest pain, SOB.  Pt states she used the last of her inhaler. OTC albuterol inhaler and tylenol     Pt states recent travel for the holidays. And will be going on a road trip tomorrow.     Pt requesting a refill albuterol and naltrexone    Sore Throat   This is a new problem. The current episode started in the past 7 days. The problem has been unchanged. Sore throat worse side: entire. There has been no fever. The pain is at a severity of 5/10. The pain is mild. Associated symptoms include trouble swallowing. Pertinent negatives include no abdominal pain, congestion, coughing, diarrhea, drooling, ear discharge, ear pain, headaches, hoarse voice, plugged ear sensation, neck pain, shortness of breath, stridor, swollen glands or vomiting. She has had no exposure to strep or mono. She has tried acetaminophen and NSAIDs for the symptoms.       HENT:  Positive for sore throat and trouble swallowing. Negative for ear pain, ear discharge, drooling and congestion.    Neck: Negative for neck pain.   Respiratory:  Negative for cough, shortness of breath and stridor.    Gastrointestinal:  Negative for abdominal pain, vomiting and diarrhea.   Neurological:  Negative for headaches.      Objective:     Physical Exam   Constitutional: She is oriented to person, place, and time. She appears well-developed. She is cooperative.  Non-toxic appearance. She does not appear ill. No distress.   HENT:   Head: " Normocephalic and atraumatic.   Ears:   Right Ear: Hearing, tympanic membrane, external ear and ear canal normal. no impacted cerumen  Left Ear: Hearing, tympanic membrane, external ear and ear canal normal. no impacted cerumen  Nose: Congestion present. No mucosal edema, rhinorrhea or nasal deformity. No epistaxis. Right sinus exhibits no maxillary sinus tenderness and no frontal sinus tenderness. Left sinus exhibits no maxillary sinus tenderness and no frontal sinus tenderness.   Mouth/Throat: Uvula is midline, oropharynx is clear and moist and mucous membranes are normal. No trismus in the jaw. Normal dentition. No uvula swelling. No oropharyngeal exudate or posterior oropharyngeal edema.      Comments: +ve pharyngeal erythema w/o tonsillar swelling or exudates.        Eyes: Conjunctivae and lids are normal. No scleral icterus.   Neck: Trachea normal and phonation normal. Neck supple. No edema present. No erythema present. No neck rigidity present.   Cardiovascular: Normal rate, regular rhythm, normal heart sounds and normal pulses.   No murmur heard.  Pulmonary/Chest: Effort normal and breath sounds normal. No stridor. No respiratory distress. She has no decreased breath sounds. She has no wheezes. She has no rhonchi. She has no rales.   Abdominal: Normal appearance. She exhibits no distension. There is no abdominal tenderness. There is no left CVA tenderness and no right CVA tenderness.   Musculoskeletal: Normal range of motion.         General: No deformity. Normal range of motion.      Cervical back: She exhibits no tenderness.   Lymphadenopathy:     She has no cervical adenopathy.   Neurological: She is alert, oriented to person, place, and time and at baseline. She exhibits normal muscle tone. Coordination normal.   Skin: Skin is warm, dry, intact, not diaphoretic and not pale.   Psychiatric: Her speech is normal and behavior is normal. Judgment and thought content normal.   Nursing note and vitals  reviewed.      Assessment:     1. Sore throat    2. Acute URI    3. Acute viral bronchitis    4. Encounter for medication refill        Plan:   Discussed exam findings/results/diagnosis/plan with patient. Advised to f/u with PCP within 2-5 days. ER precautions given if symptoms get any worse. All questions answered. Patient verbally understood and agreed with treatment plan.  Educational materials and instructions regarding the visit diagnosis and management provided.     Sore throat  -     POCT Strep A, Molecular    Acute URI    Acute viral bronchitis    Encounter for medication refill    Other orders  -     albuterol (PROVENTIL/VENTOLIN HFA) 90 mcg/actuation inhaler; Inhale 2 puffs into the lungs every 6 (six) hours as needed for Wheezing or Shortness of Breath. Rescue  Dispense: 18 g; Refill: 0  -     benzonatate (TESSALON) 200 MG capsule; Take 1 capsule (200 mg total) by mouth 3 (three) times daily as needed for Cough.  Dispense: 21 capsule; Refill: 0  -     Discontinue: natrexone tablet 4 mg; Take 1 tablet (4 mg total) by mouth every evening.  Dispense: 30 tablet; Refill: 0  -     natrexone tablet 4 mg; Take 1 tablet (4 mg total) by mouth every evening.  Dispense: 30 tablet; Refill: 0

## 2025-01-16 ENCOUNTER — E-VISIT (OUTPATIENT)
Dept: INTERNAL MEDICINE | Facility: CLINIC | Age: 40
End: 2025-01-16
Payer: COMMERCIAL

## 2025-01-16 DIAGNOSIS — J01.90 ACUTE SINUSITIS, RECURRENCE NOT SPECIFIED, UNSPECIFIED LOCATION: Primary | ICD-10-CM

## 2025-01-16 PROCEDURE — 99499 UNLISTED E&M SERVICE: CPT | Mod: ,,, | Performed by: EMERGENCY MEDICINE

## 2025-01-16 RX ORDER — GUAIFENESIN AND DEXTROMETHORPHAN HYDROBROMIDE 600; 30 MG/1; MG/1
1 TABLET, EXTENDED RELEASE ORAL 2 TIMES DAILY PRN
Qty: 20 TABLET | Refills: 0 | Status: SHIPPED | OUTPATIENT
Start: 2025-01-16

## 2025-01-16 RX ORDER — VITAMIN A 3000 MCG
1 CAPSULE ORAL
Qty: 15 ML | Refills: 0 | Status: SHIPPED | OUTPATIENT
Start: 2025-01-16

## 2025-01-16 RX ORDER — FLUTICASONE PROPIONATE 50 MCG
1 SPRAY, SUSPENSION (ML) NASAL DAILY
Qty: 9.9 ML | Refills: 0 | Status: SHIPPED | OUTPATIENT
Start: 2025-01-16

## 2025-01-16 RX ORDER — IBUPROFEN 600 MG/1
600 TABLET ORAL EVERY 4 HOURS PRN
Qty: 20 TABLET | Refills: 0 | Status: SHIPPED | OUTPATIENT
Start: 2025-01-16

## 2025-01-16 RX ORDER — ACETAMINOPHEN 500 MG
1000 TABLET ORAL EVERY 6 HOURS PRN
Qty: 30 TABLET | Refills: 0 | Status: SHIPPED | OUTPATIENT
Start: 2025-01-16

## 2025-01-16 RX ORDER — CETIRIZINE HYDROCHLORIDE 10 MG/1
10 TABLET ORAL DAILY
Qty: 7 TABLET | Refills: 0 | Status: SHIPPED | OUTPATIENT
Start: 2025-01-16 | End: 2025-01-23

## 2025-01-16 RX ORDER — DOXYCYCLINE HYCLATE 100 MG
100 TABLET ORAL 2 TIMES DAILY
Qty: 14 TABLET | Refills: 0 | Status: SHIPPED | OUTPATIENT
Start: 2025-01-16 | End: 2025-01-23

## 2025-01-16 NOTE — PROGRESS NOTES
Patient ID: Elizabeth Frankel is a 39 y.o. female.    Chief Complaint: General Illness (Entered automatically based on patient selection in [employer].)    The patient initiated a request through [employer] on 1/16/2025 for evaluation and management with a chief complaint of General Illness (Entered automatically based on patient selection in [employer].)     I evaluated the questionnaire submission on 1/16/2025  .    [facility] Peq Evisit Supergroup-Cough And Cold    1/16/2025 11:58 AM CST - Filed by Patient   What do you need help with? Sinus Infection   Do you agree to participate in an E-Visit? Yes   If you have any of the following symptoms, go to your local emergency room or call 911: I acknowledge   Do you have any of the following pregnancy-related conditions? None   What is the main issue you would like addressed today? Sinus and cough   Do you think you might have COVID or the Flu? No   Have you tested positive for COVID or Flu? No   What symptoms do you currently have?  Chills;  Cough;  Fatigue;  Headache;  Nasal Congestion;  Muscle or body aches;  Sore throat;  Pain around the nose and face;  Neck pain   Describe your cough: Productive (containing mucus)   Describe the mucus: Green   Have you had any of the following? Trouble breathing   Please enter a few details about your swallowing, breathing, or visual problems. Asthma   Have you ever smoked? I smoked in the past   Have you had a fever? Yes   What has been the range of your fever? Below 100.4   When did your symptoms first appear? 1/1/2025   In the last two weeks, have you been in close contact with someone who has COVID-19 or the Flu? No   List what you have done or taken to help your symptoms. Urgent care   How severe are your symptoms? Moderate   Have your symptoms gotten better or worse since they started?  Worse   Do you have transportation to get testing if it is needed and ordered for you at an [facility] location? Yes   Provide any additional information you  feel is important.    Please attach any relevant images or files    Are you able to take your vital signs? No         Encounter Diagnosis   Name Primary?    Acute sinusitis, recurrence not specified, unspecified location Yes        No orders of the defined types were placed in this encounter.     Medications Ordered This Encounter   Medications    acetaminophen (TYLENOL) 500 MG tablet     Sig: Take 2 tablets (1,000 mg total) by mouth every 6 (six) hours as needed for Pain (fever).     Dispense:  30 tablet     Refill:  0    cetirizine (ZYRTEC) 10 MG tablet     Sig: Take 1 tablet (10 mg total) by mouth once daily. for 7 days     Dispense:  7 tablet     Refill:  0    dextromethorphan-guaiFENesin  mg (MUCINEX DM)  mg per 12 hr tablet     Sig: Take 1 tablet by mouth 2 (two) times daily as needed (congestion).     Dispense:  20 tablet     Refill:  0    doxycycline (VIBRA-TABS) 100 MG tablet     Sig: Take 1 tablet (100 mg total) by mouth 2 (two) times daily. for 7 days     Dispense:  14 tablet     Refill:  0    fluticasone propionate (FLONASE) 50 mcg/actuation nasal spray     Si spray (50 mcg total) by Each Nostril route once daily.     Dispense:  9.9 mL     Refill:  0    ibuprofen (ADVIL,MOTRIN) 600 MG tablet     Sig: Take 1 tablet (600 mg total) by mouth every 4 (four) hours as needed for Pain (fever).     Dispense:  20 tablet     Refill:  0    sodium chloride (SALINE NASAL) 0.65 % nasal spray     Si spray by Nasal route as needed.     Dispense:  15 mL     Refill:  0        No follow-ups on file.      E-Visit Time Trackin

## 2025-01-19 NOTE — TELEPHONE ENCOUNTER
Refill Routing Note   Medication(s) are not appropriate for processing by [facility] for the following reason(s):        Non-participating provider  Responsible provider unclear    [facility] action(s):  Route             Appointments  past 12m or future 3m with PCP    Date Provider   Last Visit   1/16/2025 José Prince MD   Next Visit   Visit date not found José Prince MD   ED visits in past 90 days: 0        Note composed:4:59 PM 01/19/2025

## 2025-01-21 RX ORDER — PROPRANOLOL HYDROCHLORIDE 10 MG/1
TABLET ORAL
Qty: 30 TABLET | Refills: 5 | Status: SHIPPED | OUTPATIENT
Start: 2025-01-21

## 2025-01-27 ENCOUNTER — PATIENT MESSAGE (OUTPATIENT)
Dept: INTERNAL MEDICINE | Facility: CLINIC | Age: 40
End: 2025-01-27
Payer: COMMERCIAL

## 2025-02-20 ENCOUNTER — PATIENT MESSAGE (OUTPATIENT)
Dept: INTERNAL MEDICINE | Facility: CLINIC | Age: 40
End: 2025-02-20
Payer: COMMERCIAL

## 2025-02-21 RX ORDER — ONDANSETRON 4 MG/1
4 TABLET, FILM COATED ORAL EVERY 6 HOURS
Qty: 20 TABLET | Refills: 0 | Status: SHIPPED | OUTPATIENT
Start: 2025-02-21

## 2025-03-10 ENCOUNTER — OFFICE VISIT (OUTPATIENT)
Dept: INTERNAL MEDICINE | Facility: CLINIC | Age: 40
End: 2025-03-10

## 2025-03-10 ENCOUNTER — HOSPITAL ENCOUNTER (OUTPATIENT)
Dept: RADIOLOGY | Facility: OTHER | Age: 40
Discharge: HOME | End: 2025-03-10
Attending: EMERGENCY MEDICINE
Payer: COMMERCIAL

## 2025-03-10 ENCOUNTER — CLINICAL SUPPORT (OUTPATIENT)
Dept: INTERNAL MEDICINE | Facility: CLINIC | Age: 40
End: 2025-03-10
Payer: COMMERCIAL

## 2025-03-10 VITALS
DIASTOLIC BLOOD PRESSURE: 70 MMHG | OXYGEN SATURATION: 99 % | BODY MASS INDEX: 19.15 KG/M2 | SYSTOLIC BLOOD PRESSURE: 102 MMHG | HEIGHT: 67 IN | WEIGHT: 122 LBS | HEART RATE: 58 BPM

## 2025-03-10 DIAGNOSIS — R59.0 LOCALIZED ENLARGED LYMPH NODES: Primary | ICD-10-CM

## 2025-03-10 DIAGNOSIS — M79.89 PARASPINAL SOFT TISSUE MASS: ICD-10-CM

## 2025-03-10 DIAGNOSIS — R59.0 LOCALIZED ENLARGED LYMPH NODES: ICD-10-CM

## 2025-03-10 LAB
ALBUMIN SERPL BCP-MCNC: 4.5 G/DL (ref 3.5–5.2)
ALP SERPL-CCNC: 55 U/L (ref 40–150)
ALT SERPL W/O P-5'-P-CCNC: 18 U/L (ref 10–44)
ANION GAP SERPL CALC-SCNC: 7 MMOL/L (ref 8–16)
AST SERPL-CCNC: 36 U/L (ref 10–40)
BASOPHILS # BLD AUTO: 0.03 K/UL (ref 0–0.2)
BASOPHILS NFR BLD: 0.5 % (ref 0–1.9)
BILIRUB SERPL-MCNC: 0.6 MG/DL (ref 0.1–1)
BILIRUB UR QL STRIP: NEGATIVE
BUN SERPL-MCNC: 9 MG/DL (ref 6–20)
C TRACH DNA SPEC QL NAA+PROBE: NOT DETECTED
CALCIUM SERPL-MCNC: 9.6 MG/DL (ref 8.7–10.5)
CHLORIDE SERPL-SCNC: 104 MMOL/L (ref 95–110)
CLARITY UR REFRACT.AUTO: CLEAR
CO2 SERPL-SCNC: 26 MMOL/L (ref 23–29)
COLOR UR AUTO: YELLOW
CREAT SERPL-MCNC: 0.7 MG/DL (ref 0.5–1.4)
DIFFERENTIAL METHOD BLD: NORMAL
EOSINOPHIL # BLD AUTO: 0.1 K/UL (ref 0–0.5)
EOSINOPHIL NFR BLD: 1.3 % (ref 0–8)
ERYTHROCYTE [DISTWIDTH] IN BLOOD BY AUTOMATED COUNT: 12.8 % (ref 11.5–14.5)
EST. GFR  (NO RACE VARIABLE): >60 ML/MIN/1.73 M^2
GLUCOSE SERPL-MCNC: 90 MG/DL (ref 70–110)
GLUCOSE UR QL STRIP: NEGATIVE
HCG INTACT+B SERPL-ACNC: <2.4 MIU/ML
HCT VFR BLD AUTO: 38.3 % (ref 37–48.5)
HGB BLD-MCNC: 12.6 G/DL (ref 12–16)
HGB UR QL STRIP: NEGATIVE
HIV 1+2 AB+HIV1 P24 AG SERPL QL IA: NORMAL
IMM GRANULOCYTES # BLD AUTO: 0.02 K/UL (ref 0–0.04)
IMM GRANULOCYTES NFR BLD AUTO: 0.4 % (ref 0–0.5)
KETONES UR QL STRIP: NEGATIVE
LEUKOCYTE ESTERASE UR QL STRIP: NEGATIVE
LYMPHOCYTES # BLD AUTO: 1.8 K/UL (ref 1–4.8)
LYMPHOCYTES NFR BLD: 33.7 % (ref 18–48)
MCH RBC QN AUTO: 30.5 PG (ref 27–31)
MCHC RBC AUTO-ENTMCNC: 32.9 G/DL (ref 32–36)
MCV RBC AUTO: 93 FL (ref 82–98)
MONOCYTES # BLD AUTO: 0.3 K/UL (ref 0.3–1)
MONOCYTES NFR BLD: 5.1 % (ref 4–15)
N GONORRHOEA DNA SPEC QL NAA+PROBE: NOT DETECTED
NEUTROPHILS # BLD AUTO: 3.2 K/UL (ref 1.8–7.7)
NEUTROPHILS NFR BLD: 59 % (ref 38–73)
NITRITE UR QL STRIP: NEGATIVE
NRBC BLD-RTO: 0 /100 WBC
PH UR STRIP: 7 [PH] (ref 5–8)
PLATELET # BLD AUTO: 275 K/UL (ref 150–450)
PMV BLD AUTO: 10.6 FL (ref 9.2–12.9)
POTASSIUM SERPL-SCNC: 4 MMOL/L (ref 3.5–5.1)
PROT SERPL-MCNC: 7.6 G/DL (ref 6–8.4)
PROT UR QL STRIP: NEGATIVE
RBC # BLD AUTO: 4.13 M/UL (ref 4–5.4)
SODIUM SERPL-SCNC: 137 MMOL/L (ref 136–145)
SP GR UR STRIP: 1.01 (ref 1–1.03)
URN SPEC COLLECT METH UR: NORMAL
WBC # BLD AUTO: 5.46 K/UL (ref 3.9–12.7)

## 2025-03-10 PROCEDURE — A9698 NON-RAD CONTRAST MATERIALNOC: HCPCS | Performed by: EMERGENCY MEDICINE

## 2025-03-10 PROCEDURE — 99213 OFFICE O/P EST LOW 20 MIN: CPT | Mod: PBBFAC,25 | Performed by: EMERGENCY MEDICINE

## 2025-03-10 PROCEDURE — 81003 URINALYSIS AUTO W/O SCOPE: CPT | Performed by: EMERGENCY MEDICINE

## 2025-03-10 PROCEDURE — 99999 PR PBB SHADOW E&M-EST. PATIENT-LVL III: CPT | Mod: PBBFAC,,, | Performed by: EMERGENCY MEDICINE

## 2025-03-10 PROCEDURE — 74177 CT ABD & PELVIS W/CONTRAST: CPT | Mod: TC

## 2025-03-10 PROCEDURE — 84702 CHORIONIC GONADOTROPIN TEST: CPT | Performed by: EMERGENCY MEDICINE

## 2025-03-10 PROCEDURE — 74177 CT ABD & PELVIS W/CONTRAST: CPT | Mod: 26,,, | Performed by: RADIOLOGY

## 2025-03-10 PROCEDURE — 99999 PR PBB SHADOW E&M-EST. PATIENT-LVL I: CPT | Mod: PBBFAC,,,

## 2025-03-10 PROCEDURE — 85025 COMPLETE CBC W/AUTO DIFF WBC: CPT | Performed by: EMERGENCY MEDICINE

## 2025-03-10 PROCEDURE — 99215 OFFICE O/P EST HI 40 MIN: CPT | Mod: S$PBB,,, | Performed by: EMERGENCY MEDICINE

## 2025-03-10 PROCEDURE — 87591 N.GONORRHOEAE DNA AMP PROB: CPT | Performed by: EMERGENCY MEDICINE

## 2025-03-10 PROCEDURE — 87389 HIV-1 AG W/HIV-1&-2 AB AG IA: CPT | Performed by: EMERGENCY MEDICINE

## 2025-03-10 PROCEDURE — 25500020 PHARM REV CODE 255: Performed by: EMERGENCY MEDICINE

## 2025-03-10 PROCEDURE — 80053 COMPREHEN METABOLIC PANEL: CPT | Performed by: EMERGENCY MEDICINE

## 2025-03-10 RX ADMIN — IOHEXOL 1000 ML: 9 SOLUTION ORAL at 04:03

## 2025-03-10 RX ADMIN — IOHEXOL 65 ML: 350 INJECTION, SOLUTION INTRAVENOUS at 04:03

## 2025-03-11 ENCOUNTER — RESULTS FOLLOW-UP (OUTPATIENT)
Dept: INTERNAL MEDICINE | Facility: CLINIC | Age: 40
End: 2025-03-11

## 2025-03-17 NOTE — PROGRESS NOTES
"Established Patient     SUBJECTIVE       Chief Complaint:   Chief Complaint   Patient presents with    Follow-up     Painful lump on lower back, "fatty tumor" removed at 24.      HISTORY OF PRESENT ILLNESS (HPI):   History of Present Illness    CHIEF COMPLAINT:  Ms. Frankel presents today for multiple concerns including a lump in her back, egg freezing consultation, and persistent GI issues.    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 She reports recurrence of the original lump with development of an additional larger, painful mass adjacent to it near the spine on the left side. She experiences pain with positional changes, particularly when transitioning from sitting to standing, requiring slow, careful movements. She reports associated sciatica-like shooting nerve pain on the left side.    GASTROINTESTINAL:  She reports persistent difficulty swallowing with episodes of severe pain, particularly at night, disrupting sleep. Episodes are intermittent, unlike previous consistent symptoms from known esophageal stricture diagnosed two years ago. She experiences daily sore throat and swollen glands, for which she takes daily Tylenol. She reports alternating constipation and diarrhea, with occasional early satiety and decreased appetite.    CONSTITUTIONAL SYMPTOMS:  She reports persistent fatigue and is not at her expected energy level for her age. She endorses chronically palpable lymph nodes with varying prominence. She reports feeling generally unwell with a decline in her overall health status. She endorses body aches, specifically noting a sore, stiff shoulder for one week. She notes significant improvement in energy levels with current medication, which was temporarily discontinued for one week due to running out, resulting in marked decrease in energy.    REPRODUCTIVE " "HEALTH:  She is turning 40 in April and is considering egg freezing. She expresses concerns about the potential health impact of the procedure given her history of health issues, specifically worried about risk of fainting and blood pressure fluctuations during the process.    SEXUAL HEALTH:  She requests STD screening. She reports having a consistent sexual partner for 6 months and using protection most of the time. She denies having sex with multiple partners.    PAST MEDICAL HISTORY:  She has a history of mononucleosis during her teenage years and esophageal stricture diagnosed 2 years ago.      ROS:  General: -fever, -chills, +fatigue, -weight gain, -weight loss, +loss of appetite  Eyes: -vision changes, -redness, -discharge  ENT: -ear pain, -nasal congestion, +sore throat  Cardiovascular: -chest pain, -palpitations, -lower extremity edema  Respiratory: -cough, -shortness of breath  Gastrointestinal: -abdominal pain, -nausea, -vomiting, +diarrhea, +constipation, -blood in stool, +change in bowel habits  Genitourinary: -dysuria, -hematuria, -frequency  Musculoskeletal: -joint pain, -muscle pain  Skin: -rash, -lesion  Neurological: -headache, -dizziness, -numbness, -tingling  Psychiatric: -anxiety, -depression, -sleep difficulty         OBJECTIVE       PHYSICAL EXAM;  Vitals:    03/10/25 1422   BP: 102/70   BP Location: Right arm   Patient Position: Sitting   Pulse: (!) 58   SpO2: 99%   Weight: 55.3 kg (122 lb)   Height: 5' 7" (1.702 m)         Vital signs and nursing assessment noted:  Bradycardia    GEN:   NAD, A & Ox3, atraumatic, well appearing, nontoxic appearing  HEENT:  PERRLA, EOMI, moist membranes, nl conjunctiva, no scleral icterus, no nystagmus, no nodes/nodules, soft, supple, FROM, no trachial deviation.  CV:   Bradycardia, regular rhythm no m/r/g, 2+ radial pulses, <2sec cap refill, no obvious JVD  RESP:  CTA B, no w/r/r, equal and bilateral chest rise, no respiratory distress  ABD:   soft, Nontender, " Nondistended, +BS, no guarding/rebound  Physical Exam  Lymphadenopathy:      Head:      Right side of head: No submandibular adenopathy.      Left side of head: No submandibular adenopathy.      Cervical: Cervical adenopathy present.      Upper Body:      Right upper body: Right: scant.      Left upper body: Left: scant.      Lower Body: Right inguinal adenopathy present. Left inguinal adenopathy present.       BACK:  FROM, no midline tenderness, no paraspinal tenderness, + muscle spasm, R lumbar paraspinal mass measuring 2 cm  EXT:   FROM, BLANDON x 4, no swelling, no edema, no calf tenderness, no bony tenderness, no warmth or redness, no crepitus, no obvious deformity  NEURO:  GCS 15, CN II-XII grossly intact, no obvious motor/sensory deficit, no tremor, negative Romberg,  nl gait/coordination  PSYCH:   Cooperative, no SI/HI, no anxiety, nl mood/affect, nl judgement/thought process  SKIN:  no rashes/lesions or masses, nl color, no pallor, warm, dry, intact          Tests      Results for orders placed or performed during the hospital encounter of 07/20/24   EKG 12-lead    Collection Time: 07/20/24  7:24 PM   Result Value Ref Range    QRS Duration 86 ms    [facility] QTC Calculation 465 ms    Narrative    Test Reason : R55,    Vent. Rate : 065 BPM     Atrial Rate : 065 BPM     P-R Int : 140 ms          QRS Dur : 086 ms      QT Int : 448 ms       P-R-T Axes : 071 088 076 degrees     QTc Int : 465 ms    Normal sinus rhythm  Normal ECG    Confirmed by Herb ELI, David LINARES ([id number]) on 7/21/2024 1:01:24 PM    Referred By: [email or web address]   SELF           Confirmed By:David Estevez MD      Labs reviewed and independently interpreted. Imaging studies reviewed.    Latest Reference Range & Units 03/10/25 15:15   WBC 3.90 - 12.70 K/uL 5.46   RBC 4.00 - 5.40 M/uL 4.13   Hemoglobin 12.0 - 16.0 g/dL 12.6   Hematocrit 37.0 - 48.5 % 38.3   MCV 82 - 98 fL 93   MCH 27.0 - 31.0 pg 30.5   MCHC 32.0 - 36.0 g/dL 32.9   RDW 11.5 - 14.5 % 12.8   Platelet  Count 150 - 450 K/uL 275   MPV 9.2 - 12.9 fL 10.6   Gran % 38.0 - 73.0 % 59.0   Lymph % 18.0 - 48.0 % 33.7   Mono % 4.0 - 15.0 % 5.1   Eos % 0.0 - 8.0 % 1.3   Basophil % 0.0 - 1.9 % 0.5   Immature Granulocytes 0.0 - 0.5 % 0.4   Gran # (ANC) 1.8 - 7.7 K/uL 3.2   Lymph # 1.0 - 4.8 K/uL 1.8   Mono # 0.3 - 1.0 K/uL 0.3   Eos # 0.0 - 0.5 K/uL 0.1   Baso # 0.00 - 0.20 K/uL 0.03   Immature Grans (Abs) 0.00 - 0.04 K/uL 0.02   nRBC 0 /100 WBC 0   Differential Method  Automated   Sodium 136 - 145 mmol/L 137   Potassium 3.5 - 5.1 mmol/L 4.0   Chloride 95 - 110 mmol/L 104   CO2 23 - 29 mmol/L 26   Anion Gap 8 - 16 mmol/L 7 (L)   BUN 6 - 20 mg/dL 9   Creatinine 0.5 - 1.4 mg/dL 0.7   eGFR >60 mL/min/1.73 m^2 >60.0   Glucose 70 - 110 mg/dL 90   Calcium 8.7 - 10.5 mg/dL 9.6   ALP 40 - 150 U/L 55   PROTEIN TOTAL 6.0 - 8.4 g/dL 7.6   Albumin 3.5 - 5.2 g/dL 4.5   BILIRUBIN TOTAL 0.1 - 1.0 mg/dL 0.6   AST 10 - 40 U/L 36   ALT 10 - 44 U/L 18   Beta HCG Quant See Text mIU/mL <2.4      Latest Reference Range & Units 08/06/24 12:25 10/31/24 14:27 11/12/24 15:50 03/10/25 15:15 03/10/25 15:24   HIV 1/2 Ag/Ab Non-reactive  Non-reactive   Non-reactive    Chlamydia, Amplified DNA Not Detected    Not Detected  Not Detected   N gonorrhoeae, amplified DNA Not Detected    Not Detected  Not Detected   Mono, Immunochomatopraphic IM Heterophile Antibody Negative  Negative       Specimen UA  Urine, Clean Catch Urine, Clean Catch Urine, Clean Catch  Urine, Clean Catch   Color, UA Yellow, Straw, Angelina  Yellow Yellow Colorless !  Yellow   Appearance, UA Clear  Clear Clear Cloudy !  Clear   Spec Grav UA 1.005 - 1.030  1.010 1.015 1.005  1.010   pH, UA 5.0 - 8.0  7.0 7.0 7.0  7.0   Protein, UA Negative  Negative Negative Negative  Negative   Glucose, UA Negative  Negative Negative Negative  Negative   Ketones, UA Negative  Negative Negative Negative  Negative   Blood, UA Negative  Negative Negative Negative  Negative   NITRITE UA Negative  Negative Negative  "Negative  Negative   Bilirubin (UA) Negative  Negative Negative Negative  Negative   Leukocyte Esterase, UA Negative  Negative Negative 3+ !  Negative   RBC, UA 0 - 4 /hpf   2     WBC, UA 0 - 5 /hpf   >100 (H)     Bacteria, UA None-Occ /hpf   Occasional     Squam Epithel, UA /hpf   1     WBC Clumps, UA None-Rare    Moderate !     Microscopic Comment    SEE COMMENT         ASSESSMENT/PLAN:     MDM  Reviewed: previous chart, nursing note and vitals  Reviewed previous: labs  Interpretation: labs (Unremarkable CMP, CBC, HIV, gonorrhea, chlamydia)             Juany Ennis" was seen today for follow-up.    Diagnoses and all orders for this visit:    Localized enlarged lymph nodes  -     Urinalysis, Reflex to Urine Culture Urine, Clean Catch; Future  -     C. trachomatis/N. gonorrhoeae by AMP DNA [facility]; Urine  -     COMPREHENSIVE METABOLIC PANEL; Future  -     CBC W/ AUTO DIFFERENTIAL; Future  -     HIV 1/2 Ag/Ab (4th Gen); Future  -     CT Abdomen Pelvis With IV Contrast Routine Oral Contrast; Future  -     HCG, QUANTITATIVE, PREGNANCY; Future  -     Urinalysis, Reflex to Urine Culture Urine, Clean Catch    Paraspinal soft tissue mass  -     Ambulatory referral/consult to Interventional Radiology; Future       Assessment & Plan    IMPRESSION:  - Assessed recurrent and spreading soft tissue mass on patient's back, causing pain and impaired mobility  - Evaluated swollen lymph nodes in neck, axillas, and groin; recommended biopsy to rule out malignancy  - Considered possibility of muscle spasms contributing to back discomfort  - Discussed potential impacts of egg freezing on patient's POTS symptoms  - Assessed persistent GERD symptoms and difficulty swallowing; considering repeat endoscopy  - Ordered CT instead of MRI to evaluate back lipoma and lymph nodes    - Explained lipomas can recur and spread  - Discussed lymph node function and reasons for persistent swelling  - Provided information on potential hormone " fluctuations during egg freezing process and impact on POTS symptoms  - Discussed legal considerations for egg freezing in different states    - Ms. Frankel to increase salt and water intake during egg freezing process    - Continued unnamed medication for energy    - CT of back ordered to evaluate back mass and lymph nodes  - Urine STD screening ordered  - HIV test ordered  - Pregnancy test ordered    - Referred to dermatologist for evaluation and potential removal of lipoma  - Referred to fertility specialist for egg freezing process consultation        The results of physical exam findings, labs, and imaging were reviewed with the patient. Management of above assessments/visit diagnoses was discussed with patient. Precautions for return discussed at length. Patient was given ample time for questions. All questions asked and answered to the satisfaction of the patient. Patient is in agreement with the above and verbalized understanding. Total time spent caring for the patient today was 40 minutes. This includes time spent before the visit reviewing the chart, time spent during the visit, and time spent after the visit on documentation.     José Prince MD  [facility]     Disclaimer: This document was created using voice recognition software ([employer]). Although it may be edited, this document may contain errors related to incorrect recognition of the spoken word. Please contact the physician if clarification is needed.     This note was generated with the assistance of ambient listening technology. Verbal consent was obtained by the patient and accompanying visitor(s) for the recording of patient appointment to facilitate this note. I attest to having reviewed and edited the generated note for accuracy, though some syntax or spelling errors may persist. Please contact the author of this note for any clarification.

## 2025-03-21 DIAGNOSIS — R59.0 LOCALIZED ENLARGED LYMPH NODES: Primary | ICD-10-CM

## 2025-04-07 ENCOUNTER — RESULTS FOLLOW-UP (OUTPATIENT)
Dept: INTERNAL MEDICINE | Facility: CLINIC | Age: 40
End: 2025-04-07

## 2025-04-07 ENCOUNTER — OFFICE VISIT (OUTPATIENT)
Dept: URGENT CARE | Facility: CLINIC | Age: 40
End: 2025-04-07
Payer: COMMERCIAL

## 2025-04-07 VITALS
DIASTOLIC BLOOD PRESSURE: 53 MMHG | HEART RATE: 59 BPM | TEMPERATURE: 98 F | BODY MASS INDEX: 19.15 KG/M2 | WEIGHT: 122 LBS | RESPIRATION RATE: 19 BRPM | OXYGEN SATURATION: 97 % | SYSTOLIC BLOOD PRESSURE: 117 MMHG | HEIGHT: 67 IN

## 2025-04-07 DIAGNOSIS — M79.642 HAND PAIN, LEFT: ICD-10-CM

## 2025-04-07 DIAGNOSIS — S69.92XA INJURY OF LEFT HAND, INITIAL ENCOUNTER: Primary | ICD-10-CM

## 2025-04-07 PROCEDURE — 99213 OFFICE O/P EST LOW 20 MIN: CPT | Mod: S$GLB,,, | Performed by: NURSE PRACTITIONER

## 2025-04-07 PROCEDURE — 73130 X-RAY EXAM OF HAND: CPT | Mod: LT,S$GLB,, | Performed by: RADIOLOGY

## 2025-04-07 NOTE — PROGRESS NOTES
"Subjective:      Patient ID: Elizabeth Frankel is a 39 y.o. female.    Vitals:  height is 5' 7" (1.702 m) and weight is 55.3 kg (122 lb). Her oral temperature is 98 °F (36.7 °C). Her blood pressure is 117/53 (abnormal) and her pulse is 59 (abnormal). Her respiration is 19 and oxygen saturation is 97%.     Chief Complaint: No chief complaint on file.    Pt is a 39 y.o. female with the complaint of left hand pain. Pt states she accidentally slammed her left hand, more so her knuckles in the car 3 days ago but is still experiencing a throbbing pain; pain scale 7.5/10.          Hand Injury   The incident occurred 3 to 5 days ago. Incident location: slammed in car door. The pain is at a severity of 7/10. The pain is moderate. Pertinent negatives include no chest pain. The symptoms are aggravated by movement.     Constitution: Negative for chills, fatigue and fever.   Cardiovascular:  Negative for chest pain, leg swelling, palpitations and sob on exertion.   Respiratory:  Negative for chest tightness and shortness of breath.    Gastrointestinal:  Negative for nausea, vomiting and diarrhea.   Musculoskeletal:  Positive for pain (left hand).   Neurological:  Negative for headaches.      Objective:     Physical Exam   Constitutional: She is oriented to person, place, and time.   Cardiovascular: Normal rate and regular rhythm.   Pulmonary/Chest: Effort normal and breath sounds normal.   Musculoskeletal:      Left hand: She exhibits decreased range of motion, tenderness, bony tenderness and swelling. She exhibits normal two-point discrimination, normal capillary refill, no deformity and no laceration. Normal sensation noted. Decreased sensation is not present in the ulnar distribution, is not present in the medial redistribution and is not present in the radial distribution. Normal strength noted. Left index finger: There is tenderness of the MCP joint. Left middle finger: There is tenderness of the MCP joint.        " Hands:    Neurological: She is alert and oriented to person, place, and time.   Skin: Skin is warm and dry.   Vitals reviewed.    XR HAND COMPLETE 3 VIEW LEFT  Result Date: 4/7/2025  EXAMINATION: XR HAND COMPLETE 3 VIEW LEFT CLINICAL HISTORY: . Unspecified injury of left wrist, hand and finger(s), initial encounter TECHNIQUE: PA, lateral, and oblique views of the left hand were performed. COMPARISON: None FINDINGS: Bones are well mineralized. Overall alignment is within normal limits. No displaced fracture, dislocation or destructive osseous process. Joint spaces appear relatively maintained. No subcutaneous emphysema or radiopaque foreign body.     No acute displaced fracture-dislocation identified. Electronically signed by: Kwaku Gallegos MD Date:    04/07/2025 Time:    12:26      Assessment:     1. Injury of left hand, initial encounter    2. Hand pain, left        Plan:       Injury of left hand, initial encounter  -     XR HAND COMPLETE 3 VIEW LEFT; Future; Expected date: 04/07/2025  -     Ambulatory referral/consult to Hand Surgery    Hand pain, left  -     Ambulatory referral/consult to Hand Surgery      Patient Instructions   Injury of left hand, initial encounter  -     XR HAND COMPLETE 3 VIEW LEFT    Hand pain, left    -     Ambulatory referral/consult to Hand Surgery  Referral ordered.  Call [phone] to schedule appt       - Ibuprofen as directed as needed for fever/pain.  Motrin/Ibuprofen 600-800 mg every 6-8 hours for pain and inflammation.  Do not take if you have a history of GI bleeding, kidney disease, or if you take blood thinners.  Take with food and/or OTC prilosec to decrease GI side effects.     - Tylenol as directed as needed for pain. Tylenol/acetaminophen 650-1000 mg every 6-8 hours for added pain relief. Avoid tylenol if you have a history of liver disease.       Rest - Rest the injured area      Ice - Apply ice  to affected area for the first 24-48 hours.  Ice compress to the affected  area 2-3x a day for 15-20 minutes as needed for pain management. (DO NOT APPLY ICE DIRECTLY TO THE SKIN.  DO NOT LEAVE ON AFFECTED BODY PART FOR MORE THAN 20 MINUTES AT AT TIME TO AVOID INJURY TO SOFT TISSUE)      Compression - Wear ACE wrap or splint provided for compression and comfort to help reduce pain and swelling     Elevate - Elevated affected area higher than your heart to reduce swelling          Follow up with your PCP in 1 week or as needed if no improvement.      If your condition worsens or fails to improve we recommend that you receive another evaluation at the ER immediately or contact your PCP to discuss your concerns or return here.

## 2025-04-07 NOTE — PATIENT INSTRUCTIONS
Injury of left hand, initial encounter  -     XR HAND COMPLETE 3 VIEW LEFT    Hand pain, left    -     Ambulatory referral/consult to Hand Surgery  Referral ordered.  Call [phone] to schedule appt       - Ibuprofen as directed as needed for fever/pain.  Motrin/Ibuprofen 600-800 mg every 6-8 hours for pain and inflammation.  Do not take if you have a history of GI bleeding, kidney disease, or if you take blood thinners.  Take with food and/or OTC prilosec to decrease GI side effects.     - Tylenol as directed as needed for pain. Tylenol/acetaminophen 650-1000 mg every 6-8 hours for added pain relief. Avoid tylenol if you have a history of liver disease.       Rest - Rest the injured area      Ice - Apply ice  to affected area for the first 24-48 hours.  Ice compress to the affected area 2-3x a day for 15-20 minutes as needed for pain management. (DO NOT APPLY ICE DIRECTLY TO THE SKIN.  DO NOT LEAVE ON AFFECTED BODY PART FOR MORE THAN 20 MINUTES AT AT TIME TO AVOID INJURY TO SOFT TISSUE)      Compression - Wear ACE wrap or splint provided for compression and comfort to help reduce pain and swelling     Elevate - Elevated affected area higher than your heart to reduce swelling          Follow up with your PCP in 1 week or as needed if no improvement.      If your condition worsens or fails to improve we recommend that you receive another evaluation at the ER immediately or contact your PCP to discuss your concerns or return here.

## 2025-04-09 DIAGNOSIS — G43.909 MIGRAINE WITHOUT STATUS MIGRAINOSUS, NOT INTRACTABLE, UNSPECIFIED MIGRAINE TYPE: ICD-10-CM

## 2025-04-09 DIAGNOSIS — F41.1 GENERALIZED ANXIETY DISORDER: ICD-10-CM

## 2025-04-09 RX ORDER — HYDROXYZINE HYDROCHLORIDE 25 MG/1
50 TABLET, FILM COATED ORAL 3 TIMES DAILY PRN
Qty: 60 TABLET | Refills: 5 | Status: SHIPPED | OUTPATIENT
Start: 2025-04-09

## 2025-04-09 RX ORDER — SUMATRIPTAN SUCCINATE 50 MG/1
50 TABLET ORAL
Qty: 15 TABLET | Refills: 1 | Status: SHIPPED | OUTPATIENT
Start: 2025-04-09

## 2025-04-19 DIAGNOSIS — F41.1 GENERALIZED ANXIETY DISORDER: ICD-10-CM

## 2025-04-21 RX ORDER — FLUOXETINE HYDROCHLORIDE 20 MG/1
CAPSULE ORAL
Qty: 90 CAPSULE | Refills: 3 | Status: SHIPPED | OUTPATIENT
Start: 2025-04-21

## 2025-04-21 NOTE — TELEPHONE ENCOUNTER
Refill Encounter    PCP Visits: Recent Visits  No visits were found meeting these conditions.  Showing recent visits within past 360 days and meeting all other requirements  Future Appointments  No visits were found meeting these conditions.  Showing future appointments within next 720 days and meeting all other requirements      Last 3 Blood Pressure:   BP Readings from Last 3 Encounters:   04/07/25 (!) 117/53   03/10/25 102/70   12/28/24 102/70     Preferred Pharmacy:   [facility]/pharm[facility] #[id number] - [place], [place] - [address]  [address] [address]  Phone: [phone] Fax: [phone]    [facility] #[id number] - [place], [place] - [address] [facility]  [address]  [facility] [address]  Phone: [phone] Fax: [phone]    [facility]/pharmacy #[id number] - [place], [place] - [address]  [address]  [place] [address]  Phone: [phone] Fax: [phone]    [facility] - [facility] [address]  [address] [address]  Phone: [phone] Fax: [phone]    Requested RX:  Requested Prescriptions     Pending Prescriptions Disp Refills    FLUoxetine 20 MG capsule 90 capsule 1     Sig: Take 1 capsule (20 mg total) by mouth once daily.      RX Route: Normal

## 2025-04-30 ENCOUNTER — E-VISIT (OUTPATIENT)
Dept: INTERNAL MEDICINE | Facility: CLINIC | Age: 40
End: 2025-04-30
Payer: COMMERCIAL

## 2025-04-30 DIAGNOSIS — F41.1 GENERALIZED ANXIETY DISORDER: Primary | ICD-10-CM

## 2025-04-30 DIAGNOSIS — F32.1 MAJOR DEPRESSIVE DISORDER, SINGLE EPISODE, MODERATE DEGREE: ICD-10-CM

## 2025-04-30 RX ORDER — FLUOXETINE HYDROCHLORIDE 40 MG/1
40 CAPSULE ORAL DAILY
Qty: 30 CAPSULE | Refills: 11 | Status: SHIPPED | OUTPATIENT
Start: 2025-04-30 | End: 2026-04-30

## 2025-04-30 NOTE — PROGRESS NOTES
" Patient ID: Elizabeth Frankel is a 40 y.o. female.    Chief Complaint: General Illness (Entered automatically based on patient selection in [email or web address].)    The patient initiated a request through [email or web address] on 4/30/2025 for evaluation and management with a chief complaint of General Illness (Entered automatically based on patient selection in [email or web address].)     I evaluated the questionnaire submission on 4/30/2025  .    [facility]    4/30/2025 10:56 AM CDT - Filed by Patient   What do you need help with? Depression   Do you agree to participate in an E-Visit? Yes   If you have any of the following symptoms, please present to your local emergency room or call 911:  I acknowledge   Medication requests for narcotics will not be addressed via an E-Visit.  Please schedule an appointment. I acknowledge   Do you have any of the following pregnancy-related conditions? None   What is the main issue you would like addressed today? Depression and anxiety   Fear of embarrassment causes me to avoid doing things or speaking to people. No   I avoid activities in which I am the center of attention. No   Being embarrassed or looking stupid are among my worst fears. No   I would like to address: Medication for Anxiety or Depression   By selecting "I understand," you acknowledge that the answers that you provide for this questionnaire may not be immediately viewed by your provider or your care team. If you are personally dealing with suicidal thoughts or a crisis, please consider contacting your provider's office.  If your provider is not available, please consider taking action by: calling 911 or the National Suicide Prevention Lifeline any day, any time at 1-246.419.7031 or texting SIGNS to 375002 for 24/7 anonymous, free crisis counseling. I understand   Over the last 2 weeks, how often have you been bothered by the following problems?   Little interest or pleasure in doing things Nearly every day   Feeling " down, depressed, or hopeless Nearly every day   PHQ-2 Score (range: 0 - 6) 6 (Further screening recommended)   Feeling nervous, anxious, on edge Nearly everyday   Not being able to stop or control worrying Nearly everyday   Worrying too much about different things Nearly everyday   Trouble relaxing Nearly everyday   Being so restless that its hard to sit still More than half the days   Becoming easily annoyed or irritable More than half the days   Feeling afraid as if something awful might happen Nearly everyday   If you marked you are experiencing any of the aforementioned problems, how difficult have these made it for you to do your work, take care of things at home, or get along with other people? Somewhat difficult   TOTAL SCORE: (range: 0 - 21) 19   Do you want to address a new or existing medication? I would like to address a medication I currently take   Would you like to change or continue your medication? Continue medication   What medication would you like to continue?  Prozac   Are you taking it as prescribed? Yes    What medical condition is the  medication intended to treat? Depression/ anxiety   Is the medication helping your condition? I don't know   Are you having any side effects from the medication? No   Provide any additional information you feel is important. I suffer from anxiety most but have feeling very depressed. I would like to address increasing my prozac and possibly adding a PRN for anxiety   Please attach any relevant images or files    Are you able to take your vital signs? No         Encounter Diagnoses   Name Primary?    Generalized anxiety disorder Yes    Major depressive disorder, single episode, moderate degree       Major Depressive Disorder, Moderate to Severe  PHQ-9 score: 19, indicating moderate to severe depressive symptoms.  Patient reports little interest or pleasure in activities and persistent feelings of hopelessness nearly every day.  Currently on fluoxetine (Prozac),  taken as prescribed.  No current side effects reported; patient unsure if medication is helping.  Generalized Anxiety Disorder  JAZLYN-7 symptoms reported nearly every day, including excessive worry, restlessness, and tension.  Patient identifies anxiety as the more prominent issue but also notes a worsening of depressive symptoms.    Plan:    Orders Placed This Encounter   Procedures    Ambulatory referral/consult to Psychiatry     Standing Status:   Future     Expected Date:   2025     Expiration Date:   2026     Referral Priority:   Urgent     Referral Type:   Psychiatric     Referral Reason:   Specialty Services Required     Requested Specialty:   Psychiatry     Number of Visits Requested:   1      Medications Ordered This Encounter   Medications    FLUoxetine 40 MG capsule     Sig: Take 1 capsule (40 mg total) by mouth once daily.     Dispense:  30 capsule     Refill:  11      Medication Management:  Increase fluoxetine from 20mg to 40mg.   Continue option of adding PRN anxiolytic (e.g., hydroxyzine or propranolol) for breakthrough anxiety episodes.  Monitor for side effects and assess response to dose adjustment.  Follow-up:  Reassess  within 48 hours by in person appointment. Schedule virtual or in-person follow-up in one week and again 4-6 weeks to reassess symptom response.  Encourage continued use of self-monitoring tools (e.g., mood journaling, structured routine).  Mental Health Support:  Recommend referral or continuation of psychotherapy/counseling if not already established.  Provided crisis resources by phone in case of escalation in symptoms or suicidal ideation.  No follow-ups on file.      E-Visit Time Trackin

## 2025-05-01 ENCOUNTER — OFFICE VISIT (OUTPATIENT)
Dept: INTERNAL MEDICINE | Facility: CLINIC | Age: 40
End: 2025-05-01
Payer: COMMERCIAL

## 2025-05-01 VITALS
OXYGEN SATURATION: 99 % | HEART RATE: 57 BPM | WEIGHT: 128.06 LBS | DIASTOLIC BLOOD PRESSURE: 48 MMHG | BODY MASS INDEX: 20.1 KG/M2 | HEIGHT: 67 IN | SYSTOLIC BLOOD PRESSURE: 103 MMHG

## 2025-05-01 DIAGNOSIS — F51.02 INSOMNIA DUE TO PSYCHOLOGICAL STRESS: ICD-10-CM

## 2025-05-01 DIAGNOSIS — R45.89 ANXIETY ABOUT HEALTH: ICD-10-CM

## 2025-05-01 DIAGNOSIS — Z13.31 POSITIVE DEPRESSION SCREENING: Primary | ICD-10-CM

## 2025-05-01 RX ORDER — DIAZEPAM 2 MG/1
2 TABLET ORAL EVERY 6 HOURS PRN
Qty: 4 TABLET | Refills: 0 | Status: SHIPPED | OUTPATIENT
Start: 2025-05-01 | End: 2025-05-31

## 2025-05-01 NOTE — PROGRESS NOTES
"Established Patient     SUBJECTIVE         Chief Complaint:   Chief Complaint   Patient presents with    Depression    Anxiety      HISTORY OF PRESENT ILLNESS (HPI):   History of Present Illness    CHIEF COMPLAINT:  40-year-old female presents today with emotional distress and uncontrollable crying    HISTORY OF PRESENT ILLNESS:  She reports daily crying episodes for the past month with feelings of hopelessness, particularly regarding long-term future plans. She describes feeling irritable about everything and reports significant loneliness. She experiences persistent anxiety with physical manifestations including jaw tension, chest tightness, and tremors. She reports feeling constantly in a "fight or flight" state and as if "on the verge of being attacked." Her sleep is disturbed by racing thoughts, experiencing a semi-conscious state while still worrying. Racing thoughts occur throughout the day, not limited to nighttime.    PSYCHIATRIC HISTORY:  She has a history of mental health issues in her early 20s related to childhood trauma.    CURRENT MEDICATIONS:  She takes hydroxyzine for sleep. She recently used Klonopin from her  mother's prescription due to inability to sleep. Previous trial of propranolol with hydroxyzine resulted in physical sedation but did not address racing thoughts. Increased hydroxyzine dosage led to next-day fatigue.    FAMILY HISTORY:  Her mother had significant mental health problems requiring lifelong medication, which was described as life-saving on multiple occasions.    SOCIAL HISTORY:  She is single with no children and works from home. Her father resides in [place] but is currently in [facility]. Her mother is .      ROS:  General: -fever, -chills, -fatigue, -weight gain, -weight loss, +loss of appetite  Eyes: -vision changes, -redness, -discharge  ENT: -ear pain, -nasal congestion, -sore throat  Cardiovascular: -chest pain, -palpitations, -lower extremity edema, " "+chest tightness  Respiratory: -cough, -shortness of breath  Gastrointestinal: -abdominal pain, -nausea, -vomiting, -diarrhea, -constipation, -blood in stool  Genitourinary: -dysuria, -hematuria, -frequency  Musculoskeletal: -joint pain, -muscle pain  Skin: -rash, -lesion  Neurological: -headache, -dizziness, -numbness, -tingling  Psychiatric: +anxiety, +depression, +sleep difficulty, +excessive crying, +suicidal ideation, +hopelessness, +irritability, +inner restlessness         OBJECTIVE       PHYSICAL EXAM;  Vitals:    05/01/25 1120   BP: (!) 103/48   Patient Position: Sitting   Pulse: (!) 57   SpO2: 99%   Weight: 58.1 kg (128 lb 1.4 oz)   Height: 5' 7" (1.702 m)       PHYSICAL EXAMINATION:  Vitals and nursing note reviewed.  Bradycardia  GENERAL:  Mild distress secondary to anxiety alert and O x 3, well-developed, well-nourished  Physical Exam  Psychiatric:         Attention and Perception: Attention and perception normal.         Mood and Affect: Mood is anxious and depressed. Mood is not elated. Affect is tearful. Affect is not labile, blunt, flat, angry or inappropriate.         Speech: Speech normal.         Behavior: Behavior normal. Behavior is not slowed, withdrawn or combative. Behavior is cooperative.         Thought Content: Thought content is not paranoid. Thought content does not include homicidal or suicidal ideation. Thought content does not include homicidal or suicidal plan.         Cognition and Memory: Cognition and memory normal.         Judgment: Judgment normal.       HEENT: Head is normocephalic and atraumatic. Extraocular muscles are intact. Pupils are equal, round, and reactive to light and accommodation. Nares appeared normal. Mouth is without lesions. Mucous membranes are moist. no nystagmus  LUNGS: equal and bilateral chest rise, no obvious wheezing,  no respiratory distress  CV:  no obvious JVD  ABDOMEN:  nondistended.  No guarding  BACK: FROM, neg SLR  EXTREMITIES: BLANDON x 4, FROM, no " obvious swelling, no pedal edema  SKIN: warm, dry, intact, no rash/lesions, no pallor  NEUROLOGIC: Cranial nerves II through XII are grossly intact. No obvious motor/senosry deficit  PSYCH: nl attitude, nl mood/affect, nl speech, nl thought process/form, no SI/HI, no AH/VH      Tests    Cardiovascular Testing  Results for orders placed or performed during the hospital encounter of 07/20/24   EKG 12-lead    Collection Time: 07/20/24  7:24 PM   Result Value Ref Range    QRS Duration 86 ms    [facility] QTC Calculation 465 ms    Narrative    Test Reason : R55,    Vent. Rate : 065 BPM     Atrial Rate : 065 BPM     P-R Int : 140 ms          QRS Dur : 086 ms      QT Int : 448 ms       P-R-T Axes : 071 088 076 degrees     QTc Int : 465 ms    Normal sinus rhythm  Normal ECG    Confirmed by Hreb ELI, David LINARES ([id number]) on 7/21/2024 1:01:24 PM    Referred By: [email or web address]   SELF           Confirmed By:David Estevez MD      Echo  Result Date: 8/12/2024    Left Ventricle: The left ventricle is normal in size. Normal wall   thickness. There is normal systolic function with a visually estimated   ejection fraction of 60 - 65%. There is normal diastolic function. Normal   left ventricular filling pressure.    Right Ventricle: Normal right ventricular cavity size. Wall thickness   is normal. Right ventricle wall motion  is normal. Systolic function is   normal.    Left Atrium: Normal left atrial size.    Right Atrium: Normal right atrial size.    Aortic Valve: The aortic valve is a trileaflet valve.    Aorta: Aortic root is normal in size measuring 2.95 cm. Ascending aorta   is normal measuring 2.45 cm.    Pulmonary Artery: The estimated pulmonary artery systolic pressure is   21 mmHg.    IVC/SVC: Normal venous pressure at 3 mmHg.         Labs reviewed and independently interpreted.    Latest Reference Range & Units 03/10/25 15:15   WBC 3.90 - 12.70 K/uL 5.46   RBC 4.00 - 5.40 M/uL 4.13   Hemoglobin 12.0 - 16.0 g/dL 12.6   Hematocrit 37.0 -  "48.5 % 38.3   MCV 82 - 98 fL 93   MCH 27.0 - 31.0 pg 30.5   MCHC 32.0 - 36.0 g/dL 32.9   RDW 11.5 - 14.5 % 12.8   Platelet Count 150 - 450 K/uL 275   MPV 9.2 - 12.9 fL 10.6   Gran % 38.0 - 73.0 % 59.0   Lymph % 18.0 - 48.0 % 33.7   Mono % 4.0 - 15.0 % 5.1   Eos % 0.0 - 8.0 % 1.3   Basophil % 0.0 - 1.9 % 0.5   Immature Granulocytes 0.0 - 0.5 % 0.4   Gran # (ANC) 1.8 - 7.7 K/uL 3.2   Lymph # 1.0 - 4.8 K/uL 1.8   Mono # 0.3 - 1.0 K/uL 0.3   Eos # 0.0 - 0.5 K/uL 0.1   Baso # 0.00 - 0.20 K/uL 0.03   Immature Grans (Abs) 0.00 - 0.04 K/uL 0.02   nRBC 0 /100 WBC 0   Differential Method  Automated   Sodium 136 - 145 mmol/L 137   Potassium 3.5 - 5.1 mmol/L 4.0   Chloride 95 - 110 mmol/L 104   CO2 23 - 29 mmol/L 26   Anion Gap 8 - 16 mmol/L 7 (L)   BUN 6 - 20 mg/dL 9   Creatinine 0.5 - 1.4 mg/dL 0.7   eGFR >60 mL/min/1.73 m^2 >60.0   Glucose 70 - 110 mg/dL 90   Calcium 8.7 - 10.5 mg/dL 9.6   ALP 40 - 150 U/L 55   PROTEIN TOTAL 6.0 - 8.4 g/dL 7.6   Albumin 3.5 - 5.2 g/dL 4.5   BILIRUBIN TOTAL 0.1 - 1.0 mg/dL 0.6   AST 10 - 40 U/L 36   ALT 10 - 44 U/L 18   Beta HCG Quant See Text mIU/mL <2.4   HIV 1/2 Ag/Ab Non-reactive  Non-reactive       ASSESSMENT/PLAN:     MDM  Reviewed: previous chart, nursing note and vitals  Reviewed previous: labs and ECG  Interpretation: labs (Unremarkable CMP, CBC, pregnancy test, HIV)             Juany"Su" was seen today for depression and anxiety.    Diagnoses and all orders for this visit:    Positive depression screening  Comments:  I have reviewed the positive depression score which warrants active treatment with psychotherapy and/or medications.    Anxiety about health  -     diazePAM (VALIUM) 2 MG tablet; Take 1 tablet (2 mg total) by mouth every 6 (six) hours as needed for Anxiety or Insomnia.    Insomnia due to psychological stress  -     diazePAM (VALIUM) 2 MG tablet; Take 1 tablet (2 mg total) by mouth every 6 (six) hours as needed for Anxiety or Insomnia.       Assessment & Plan  "     IMPRESSION:  - Presenting with symptoms of depression including persistent sadness, hopelessness, irritability, and anxiety.  - Considered acute intervention to address immediate distress and inability to sleep.  - Acknowledging need for both immediate symptom management and long-term treatment plan.  - Exploring options for ongoing mental health support, including therapy and crisis resources.    - Discussed importance of professional mental health support in addition to medication management.  - Explained available crisis hotlines and text-based support services.    - Ms. Frankel to continue daily walks with the dog as a form of exercise and routine.  - Ms. Frankel to maintain social connections, including attending events with close friends when possible.  - Consider [facility] and other crisis support services.  Patient provided multiple resources during clinic visit.    - fluoxetine: Continue at 40 mg  -  Hydroxyzine: Increased to 2 tablets for sleep if needed.  - Valium: Start taking as needed for acute anxiety and sleep difficulties.  Patient to avoid mixing with Klonopin, alcohol or substances.    - Referred to a counselor who accepts insurance for ongoing therapy.     \Management of above assessments/visit diagnoses was discussed with patient. I have discussed these recommendations with the patient, including the potential risks associated with her underlying pathology, the necessary testing for diagnosis, and the emergent treatment plan.    I have also offered and thoroughly discussed further treatment options, including potential immediate crisis involvement and assessment, which the patient has declined at this time.   Patient is aware of suspected diagnosis, acknowledges understanding of reasons for recommendations.I disclosed risk and benefits of returning to the clinic. She has been informed and understands the inherent risks and has decided to accept the responsibility for this decision.  Precautions  for return discussed at length. Patient was given ample time for questions. All questions asked and answered to the satisfaction of the patient. Patient is in agreement with the above and verbalized understanding. Total time spent caring for the patient today was 56 minutes. This includes time spent before the visit reviewing the chart, at the bedside, reviewing test results, discussing the case with staff, and documenting the medical record.     Take all medications as prescribed.  Follow up with outpatient mental health provider.  Urgent referral to [facility] placed as well a call out to multiple out of [facility] system resources.  Call the clinic about any concerns or side effects with your medications.  Patient counseled on abstinence from alcohol and substances of abuse (illicit and prescription).  Relapse prevention and motivational interviewing provided.  Patient advised to call the crisis line for help in a crisis and emergent situations and present to the Emergency Department for any worsening of psychiatric symptoms or any suicidal or homicidal ideation.       José Prince MD  [facility]     Disclaimer: This document was created using voice recognition software ([employer]). Although it may be edited, this document may contain errors related to incorrect recognition of the spoken word. Please contact the physician if clarification is needed.     This note was generated with the assistance of ambient listening technology. Verbal consent was obtained by the patient and accompanying visitor(s) for the recording of patient appointment to facilitate this note. I attest to having reviewed and edited the generated note for accuracy, though some syntax or spelling errors may persist. Please contact the author of this note for any clarification.    Follow up 12:00 p.m. on 5/2/2025 patient states that she feels increasingly better than she did  yesterday.  She slept well on the Valium.  She is amenable to seeking therapy.  Resources called out who will follow up with the patient.

## 2025-05-05 ENCOUNTER — OFFICE VISIT (OUTPATIENT)
Dept: INTERNAL MEDICINE | Facility: CLINIC | Age: 40
End: 2025-05-05
Payer: COMMERCIAL

## 2025-05-05 DIAGNOSIS — G47.00 INSOMNIA, UNSPECIFIED TYPE: ICD-10-CM

## 2025-05-05 DIAGNOSIS — F32.1 MAJOR DEPRESSIVE DISORDER, SINGLE EPISODE, MODERATE DEGREE: Primary | ICD-10-CM

## 2025-05-05 PROCEDURE — 99499 UNLISTED E&M SERVICE: CPT | Mod: 95,,, | Performed by: EMERGENCY MEDICINE

## 2025-05-07 RX ORDER — MIRTAZAPINE 15 MG/1
15 TABLET, FILM COATED ORAL NIGHTLY PRN
Qty: 30 TABLET | Refills: 0 | Status: SHIPPED | OUTPATIENT
Start: 2025-05-07 | End: 2026-05-07

## 2025-05-07 NOTE — PROGRESS NOTES
Established Patient - Audio Only Telehealth Visit     The patient location is: home  The chief complaint leading to consultation is: insominia  Visit type: Virtual visit with audio only (telephone)  Total time spent in medical discussion with patient: 20 minutes  Total time spent on date of the encounter: 30 minutes     The reason for the audio only service rather than synchronous audio and video virtual visit was related to technical difficulties or patient preference/necessity.     Each patient to whom I provide medical services by telemedicine is:  (1) informed of the relationship between the physician and patient and the respective role of any other health care provider with respect to management of the patient; and (2) notified that they may decline to receive medical services by telemedicine and may withdraw from such care at any time. Patient verbally consented to receive this service via voice-only telephone call.       SUBJECTIVE         Chief Complaint: insomnia     HISTORY OF PRESENT ILLNESS (HPI):   History of Present Illness    CHIEF COMPLAINT:  Ms. Frankel presents today for evaluation of anxiety, depression and insomnia.    Ms. Frankel was recently prescribed Valium and advised to increase her Prozac dosage. She reports feeling improved with the current medication adjustments and does not feel she requires inpatient or intensive therapy at this time, expressing concern that such care would significantly disrupt her work schedule and increase her anxiety. We discussed a range of care options based on her current needs and preferences including medications and therapies. She reports that one of the places we discussed last week have reached out to her for outpatient care.        ROS:  General: -fever, -chills, -fatigue, -weight gain, -weight loss  Eyes: -vision changes, -redness, -discharge  ENT: -ear pain, -nasal congestion, -sore throat  Cardiovascular: -chest pain, -palpitations, -lower extremity  edema  Respiratory: -cough, -shortness of breath  Gastrointestinal: -abdominal pain, -nausea, -vomiting, -diarrhea, -constipation, -blood in stool  Genitourinary: -dysuria, -hematuria, -frequency  Musculoskeletal: -joint pain, -muscle pain  Skin: -rash, -lesion  Neurological: -headache, -dizziness, -numbness, -tingling  Psychiatric: +anxiety, +depression,   +sleep difficulty         OBJECTIVE       PHYSICAL EXAM;  NAD, alert and oriented x 3  Physical Exam  Constitutional:       General: She is not in acute distress.  Psychiatric:         Attention and Perception: Attention and perception normal.         Mood and Affect: Mood is depressed. Mood is not anxious or elated. Affect is not labile, blunt, flat, angry, tearful or inappropriate.         Speech: Speech normal.         Behavior: Behavior is cooperative.         Thought Content: Thought content is not paranoid or delusional. Thought content does not include homicidal or suicidal ideation. Thought content does not include homicidal or suicidal plan.         Cognition and Memory: Cognition and memory normal.         Judgment: Judgment normal.           Tests        ASSESSMENT/PLAN:     Coding         Diagnoses and all orders for this visit:    Major depressive disorder, single episode, moderate degree    Insomnia, unspecified type  -     mirtazapine (REMERON) 15 MG tablet; Take 1 tablet (15 mg total) by mouth nightly as needed (insomnia).       Assessment & Plan        IMPRESSION:  - Evaluated options for higher level of care for anxiety and depression including significant outpatient care as intermediate option between outpatient and inpatient treatment.  - Explored inpatient hospitalization options at various facilities including [facility], and [facility].    - Increased Prozac.  - Patient started Valium last week and reports some improvement in symptoms. Due to concerns about potential dependency, no refill has been provided at this time.    - Referred to  psychiatry for evaluation of treatment options including outpatient, IOP (higher care than out-pt, but lower than in-pt) and inpatient  - Provided referral information for significant outpatient care program at [facility].  - Provided referral information for [facility] as out-of-network option.  - Provided referral information for inpatient hospitalization options at various locations including [facility], and [facility].     The results of physical exam findings including capacity to make decisions were reviewed with the patient. Management of above assessments/visit diagnoses was discussed with patient. I have discussed these recommendations with the patient, including the potential risks associated with her underlying pathology, the necessary testing for diagnosis, and the emergent treatment plan.    I have also offered and thoroughly discussed further treatment options, including potential ED assessment and emergent psychiatric consult, which the patient has declined at this time.  She denies SI/HI, AH/VH.  Patient is aware of suspected diagnosis, acknowledges understanding of reasons for recommendations.I disclosed risk and benefits of returning to the clinic. She has been informed and understands the inherent risks and has decided to accept the responsibility for this decision.   She is provided with outpatient instructions for follow-up and indications to return and the after hours line should she have additional questions or concerns. Precautions for return discussed at length. Patient was given ample time for questions. All questions asked and answered to the satisfaction of the patient. Patient is in agreement with the above and verbalized understanding. Total time spent caring for the patient today was 30 minutes. This includes time spent before the visit reviewing the chart, at the bedside, reviewing test results, discussing the case with staff, and documenting the medical record.       José Prince MD  [facility]     Disclaimer: This document was created using voice recognition software (M*Modal Fluency Direct). Although it may be edited, this document may contain errors related to incorrect recognition of the spoken word. Please contact the physician if clarification is needed.     This note was generated with the assistance of ambient listening technology. Verbal consent was obtained by the patient and accompanying visitor(s) for the recording of patient appointment to facilitate this note. I attest to having reviewed and edited the generated note for accuracy, though some syntax or spelling errors may persist. Please contact the author of this note for any clarification.

## 2025-05-14 ENCOUNTER — OFFICE VISIT (OUTPATIENT)
Dept: INTERNAL MEDICINE | Facility: CLINIC | Age: 40
End: 2025-05-14
Payer: COMMERCIAL

## 2025-05-14 DIAGNOSIS — F32.A DEPRESSION, UNSPECIFIED DEPRESSION TYPE: Primary | ICD-10-CM

## 2025-05-14 PROCEDURE — 99499 UNLISTED E&M SERVICE: CPT | Mod: 95,,, | Performed by: EMERGENCY MEDICINE

## 2025-05-15 NOTE — PROGRESS NOTES
Established Patient - AudioVideo Telehealth Visit     The patient location is: home  The chief complaint leading to consultation is: depression  Visit type: Virtual visit with audio only (telephone)  Total time spent in medical discussion with patient: 27 minutes  Total time spent on date of the encounter: 40 minutes     The reason for the audio only service rather than synchronous audio and video virtual visit was related to technical difficulties or patient preference/necessity.     Each patient to whom I provide medical services by telemedicine is:  (1) informed of the relationship between the physician and patient and the respective role of any other health care provider with respect to management of the patient; and (2) notified that they may decline to receive medical services by telemedicine and may withdraw from such care at any time. Patient verbally consented to receive this service via voice-only telephone call.       SUBJECTIVE         Chief Complaint: No chief complaint on file.     HISTORY OF PRESENT ILLNESS (HPI):   This is a 40-year-old female with a history of depression, presenting after a recent worsening of symptoms. She reports that her depressive episode was triggered by feelings of loneliness and grief, particularly related to the loss of her mother and the absence of a current romantic relationship. She noted that these emotions have been building over the past several weeks. Although she described feeling severely depressed, she denied any active suicidal ideation and stated that she felt safe at home, with a few close friends available for emotional support. She had previously participated in therapy but was initially hesitant to return. However, she has since scheduled an appointment with a therapist for later this week. Pharmacologically, she increased her dose of Prozac and has been using hydroxyzine intermittently to help with sleep. She was recently prescribed Remeron, which she  reports taking as directed, and she states she is currently adherent to all medications. Since initiating these changes, she reports feeling improved. Additionally, she recently adopted a puppy, which has helped her establish routine, get out of the house more frequently, and experience a noticeable uplift in mood.            OBJECTIVE       PHYSICAL EXAM;  Physical Exam  Psychiatric:         Attention and Perception: Attention and perception normal.         Mood and Affect: Mood normal. Mood is not anxious or elated. Affect is not labile, blunt, flat, angry, tearful or inappropriate.         Speech: Speech normal.         Behavior: Behavior normal. Behavior is cooperative.         Thought Content: Thought content is not paranoid or delusional. Thought content does not include homicidal or suicidal ideation. Thought content does not include homicidal or suicidal plan.         Cognition and Memory: Cognition and memory normal.         Judgment: Judgment normal.              Tests        ASSESSMENT/PLAN:     MDM  Reviewed: previous chart and nursing note        Diagnoses and all orders for this visit:    Depression, unspecified depression type       Assessment & Plan               Recent depressive episode related to unresolved grief and social isolation.  Patient is now reporting improvement in mood with medication adherence and the addition of positive behavioral changes (e.g., caring for a pet).  Engaged in supportive psychotherapy during todays session, with focus on:  Recognizing and reframing cognitive distortions  Gaining insight into the motivation behind behaviors  Developing self-confidence and self-efficacy  Patient has a follow-up appointment with a therapist scheduled for later this week.  She was encouraged to continue therapy and to check in with our office if symptoms worsen or additional support is needed.  Management of the above diagnoses was thoroughly discussed with the patient.  Precautions for  return were reviewed at length.  The patient was given ample time for questions, all of which were answered to her satisfaction.  She verbalized understanding and agreement with the care plan.  Total time spent caring for the patient today: 40 minutes, including pre-visit chart review, direct patient care, and post-visit documentation.     José Prince MD  [facility]     Disclaimer: This document was created using voice recognition software (M*[employer] Direct). Although it may be edited, this document may contain errors related to incorrect recognition of the spoken word. Please contact the physician if clarification is needed.     This note was generated with the assistance of ambient listening technology. Verbal consent was obtained by the patient and accompanying visitor(s) for the recording of patient appointment to facilitate this note. I attest to having reviewed and edited the generated note for accuracy, though some syntax or spelling errors may persist. Please contact the author of this note for any clarification.

## 2025-06-10 ENCOUNTER — OFFICE VISIT (OUTPATIENT)
Dept: INTERNAL MEDICINE | Facility: CLINIC | Age: 40
End: 2025-06-10
Payer: COMMERCIAL

## 2025-06-10 DIAGNOSIS — F32.5 MAJOR DEPRESSIVE DISORDER WITH SINGLE EPISODE, IN REMISSION: Primary | ICD-10-CM

## 2025-06-10 PROCEDURE — 99499 UNLISTED E&M SERVICE: CPT | Mod: 95,,, | Performed by: EMERGENCY MEDICINE

## 2025-06-11 NOTE — PROGRESS NOTES
Established Patient - Audio Only Telehealth Visit     The patient location is: home  The patient location is: [place]    The chief complaint leading to consultation is: anxiety/depressino  Visit type: Virtual visit with audio only (telephone)  Total time spent in medical discussion with patient: 10 minutes  Total time spent on date of the encounter: 20 minutes      The reason for the audio only service rather than synchronous audio and video virtual visit was related to technical difficulties or patient preference/necessity.     Each patient to whom I provide medical services by telemedicine is:  (1) informed of the relationship between the physician and patient and the respective role of any other health care provider with respect to management of the patient; and (2) notified that they may decline to receive medical services by telemedicine and may withdraw from such care at any time. Patient verbally consented to receive this service via voice-only telephone call.       SUBJECTIVE         Chief Complaint:  anxiety/depression     HISTORY OF PRESENT ILLNESS (HPI):   History of Present Illness    CHIEF COMPLAINT:  Ms. Frankel presents today for follow up    MENTAL HEALTH:  She reports significant improvement in mood and feeling much better overall. Getting out of the house has been particularly helpful in improving her condition.      ROS:  General: -fever, -chills, -fatigue, -weight gain, -weight loss  Eyes: -vision changes, -redness, -discharge  ENT: -ear pain, -nasal congestion, -sore throat  Cardiovascular: -chest pain, -palpitations, -lower extremity edema  Respiratory: -cough, -shortness of breath  Gastrointestinal: -abdominal pain, -nausea, -vomiting, -diarrhea, -constipation, -blood in stool  Genitourinary: -dysuria, -hematuria, -frequency  Musculoskeletal: -joint pain, -muscle pain  Skin: -rash, -lesion  Neurological: -headache, -dizziness, -numbness, -tingling  Psychiatric: -anxiety, -depression, -sleep  difficulty         OBJECTIVE       PHYSICAL EXAM;  Physical Exam  Neck:      Trachea: Phonation normal.   Neurological:      Mental Status: She is alert and oriented to person, place, and time.   Psychiatric:         Attention and Perception: Attention and perception normal.         Mood and Affect: Mood and affect normal.         Speech: Speech normal.         Behavior: Behavior normal. Behavior is cooperative.         Thought Content: Thought content normal.         Cognition and Memory: Cognition and memory normal.         Judgment: Judgment normal.             Tests    Cardiovascular Testing  Results for orders placed or performed during the hospital encounter of 07/20/24   EKG 12-lead    Collection Time: 07/20/24  7:24 PM   Result Value Ref Range    QRS Duration 86 ms    [facility] QTC Calculation 465 ms    Narrative    Test Reason : R55,    Vent. Rate : 065 BPM     Atrial Rate : 065 BPM     P-R Int : 140 ms          QRS Dur : 086 ms      QT Int : 448 ms       P-R-T Axes : 071 088 076 degrees     QTc Int : 465 ms    Normal sinus rhythm  Normal ECG    Confirmed by Herb ELI, David LINARES ([id number]) on 7/21/2024 1:01:24 PM    Referred By: [email or web address]   SELF           Confirmed By:David Estevez MD          ASSESSMENT/PLAN:     MDM  Reviewed: previous chart         Diagnoses and all orders for this visit:    Major depressive disorder with single episode, in remission       Assessment & Plan        - Assessed mood and overall well-being, noting significant improvement.  - Evaluated adjustment to new puppy, recognizing positive impact on stability.  - Patient has determined regular check-ins no longer required due to improved condition. Advised to follow up with therapist.     Management of above assessments/visit diagnoses was discussed with patient. Precautions for return discussed at length. Patient was given ample time for questions. All questions asked and answered to the satisfaction of the patient. Patient is in agreement with  the above and verbalized understanding. Total time spent caring for the patient today was 15 minutes. This includes time spent before the visit reviewing the chart, time spent during the visit, and time spent after the visit on documentation.     José Prince MD  [facility]     Disclaimer: This document was created using voice recognition software ([employer]). Although it may be edited, this document may contain errors related to incorrect recognition of the spoken word. Please contact the physician if clarification is needed.     This note was generated with the assistance of ambient listening technology. Verbal consent was obtained by the patient and accompanying visitor(s) for the recording of patient appointment to facilitate this note. I attest to having reviewed and edited the generated note for accuracy, though some syntax or spelling errors may persist. Please contact the author of this note for any clarification.

## 2025-06-19 ENCOUNTER — PATIENT MESSAGE (OUTPATIENT)
Dept: INTERNAL MEDICINE | Facility: CLINIC | Age: 40
End: 2025-06-19
Payer: COMMERCIAL

## 2025-06-19 RX ORDER — ESOMEPRAZOLE MAGNESIUM 40 MG/1
40 CAPSULE, DELAYED RELEASE ORAL
Qty: 30 CAPSULE | Refills: 2 | Status: SHIPPED | OUTPATIENT
Start: 2025-06-19 | End: 2026-06-19

## 2025-07-14 ENCOUNTER — ON-DEMAND VIRTUAL (OUTPATIENT)
Dept: URGENT CARE | Facility: CLINIC | Age: 40
End: 2025-07-14
Payer: COMMERCIAL

## 2025-07-14 DIAGNOSIS — H10.31 ACUTE CONJUNCTIVITIS OF RIGHT EYE, UNSPECIFIED ACUTE CONJUNCTIVITIS TYPE: Primary | ICD-10-CM

## 2025-07-14 PROCEDURE — 98005 SYNCH AUDIO-VIDEO EST LOW 20: CPT | Mod: 95,,, | Performed by: NURSE PRACTITIONER

## 2025-07-14 RX ORDER — OFLOXACIN 3 MG/ML
1 SOLUTION/ DROPS OPHTHALMIC 4 TIMES DAILY
Qty: 5 ML | Refills: 0 | Status: SHIPPED | OUTPATIENT
Start: 2025-07-14

## 2025-07-14 NOTE — PROGRESS NOTES
Subjective:      Patient ID: Elizabeth Frankel is a 40 y.o. female.    Vitals:  vitals were not taken for this visit.     Chief Complaint: Conjunctivitis      Visit Type: TELE AUDIOVISUAL - This visit was conducted virtually based on  subjective information and limited objective exam    Present with the patient at the time of consultation: TELEMED PRESENT WITH PATIENT: None  LOCATION OF PATIENT [place]  Two patient identifiers used to verify patient- saying out date of birth and full name.       Past Medical History:   Diagnosis Date    Anxiety     Asthma     B12 deficiency anemia     Central serous retinopathy     Esophageal stricture     Hiatal hernia      Past Surgical History:   Procedure Laterality Date    WRIST SURGERY Bilateral     ligament repair     Review of patient's allergies indicates:   Allergen Reactions    Amoxicillin Nausea Only     Medications Ordered Prior to Encounter[1]  Family History   Problem Relation Name Age of Onset    Cancer Mother Idalmis         HPV anal cancer    Depression Mother Idalmis     Macular degeneration Mother Idalmis     Early death Mother Idalmis     Asthma Father Randy Frankel     No Known Problems Sister half     No Known Problems Sister half     Lung cancer Maternal Grandmother Tahmina     Breast cancer Maternal Grandmother Tahmina 40 - 49    Cancer Maternal Grandmother Tahmina     No Known Problems Maternal Grandfather      No Known Problems Paternal Grandmother      No Known Problems Paternal Grandfather         Medications Ordered                [id number] - [place], [place] - [address] [facility]   [address] [place] [id number]    Telephone: [phone]   Fax: [phone]   Hours: Not open 24 hours                         E-Prescribed (1 of 1)              ofloxacin (OCUFLOX) 0.3 % ophthalmic solution    Sig: Place 1 drop into the right eye 4 (four) times daily.       Start: 7/14/25     Quantity: 5 mL Refills: 0                           Ohs Peq Odvv  Intake    7/14/2025 10:19 AM CDT - Filed by Patient   What is your current physical address in the event of a medical emergency? [place]   Are you able to take your vital signs? No   Please attach any relevant images or files File not available.   Is your employer contracted with [facility]? No         41 yo female with c/o itchy crusting right eye and redness. She states symptoms started this morning. She states she has a work trip. She states no change in vsion. She states some blurriness due to watering. She states did have an eye exam a week ago. She wears glasses.         Constitution: Negative. Negative for fever.   HENT: Negative.  Negative for congestion, sinus pressure and sore throat.    Eyes:  Positive for eye discharge, eye itching, eye pain and eye redness. Negative for eye trauma, foreign body in eye, photophobia, vision loss, double vision, blurred vision and eyelid swelling.   Respiratory: Negative.     Gastrointestinal: Negative.  Negative for nausea and vomiting.   Genitourinary: Negative.    Musculoskeletal: Negative.    Allergic/Immunologic: Negative.    Neurological: Negative.  Negative for headaches.        Objective:   The physical exam was conducted virtually.    AAO x 3 ; no acute distress noted; appearance normal; mood and behavior normal; thought process normal  Head- normocephalic  Nose- appears normal, no discharge or erythema  Eyes- pupils appear normal in size, no drainage, no erythema  Ears- normal appearing; no discharge, no erythema  Mouth- appears normal  Oropharynx- no erythema, lesions  Lungs- breathing at a normal rate, no acute distress noted  Heart- no reports of tachycardia, palpitations, chest pain  Abdomen- non distended, non tender reported by patient  Skin- warm and dry, no erythema or edema noted by patient or visualized  Psych- as above; no si/hi      Assessment:     1. Acute conjunctivitis of right eye, unspecified acute conjunctivitis type        Plan:      Recommendations:    Avoid contact with eyes and perform good hand hygiene.     If you were prescribed antibiotic eye drops take as directed.     Do not wear contacts for one week. Avoid eye make-up.     Do not rub eyes. Wash hands frequently and disinfect common surfaces to avoid spread.    Warm and cool compresses may be soothing.    Artificial tears may help lubricate and rinse the eye. You may refrigerate the drops for added comfort    For allergic conjunctivitis, use antihistamine eye drops such as Pataday or Zaditor as directed on package.       Follow up with Eye Doctor if no improvement in symptoms or for any worsening of symptoms.           Thank you for choosing [facility]!    Our goal in the [facility] is to always provide outstanding medical care. You may receive a survey by mail or e-mail in the next week regarding your experience today. We would greatly appreciate you completing and returning the survey. Your feedback provides us with a way to recognize our staff who provide very good care, and it helps us learn how to improve when your experience was below our aspiration of excellence.         We appreciate you trusting us with your medical care. We hope you feel better soon. We will be happy to take care of you for all of your future medical needs.    You must understand that you've received an Urgent Care treatment only and that you may be released before all your medical problems are known or treated. You, the patient, will arrange for follow up care as instructed.    Follow up with your PCP or specialty clinic as directed in the next 1-2 weeks if not improved or as needed.  You can call [phone] to schedule an appointment with the appropriate provider.    If your condition worsens we recommend that you receive another evaluation in person, with your primary care provider, urgent care or at the emergency room immediately or contact your primary medical  clinics after hours call service to discuss your concerns.         Acute conjunctivitis of right eye, unspecified acute conjunctivitis type  -     ofloxacin (OCUFLOX) 0.3 % ophthalmic solution; Place 1 drop into the right eye 4 (four) times daily.  Dispense: 5 mL; Refill: 0                         [1]   Current Outpatient Medications on File Prior to Visit   Medication Sig Dispense Refill    albuterol (PROVENTIL/VENTOLIN HFA) 90 mcg/actuation inhaler Inhale 2 puffs into the lungs every 6 (six) hours as needed for Wheezing or Shortness of Breath. Rescue 18 g 0    albuterol sulfate 90 mcg/actuation aebs Inhale 180 mcg into the lungs every 4 to 6 hours as needed (wheezing). 1 each 2    blood pressure test kit-large Kit Take daily values 1 each 0    cetirizine (ZYRTEC) 10 MG tablet Take 1 tablet (10 mg total) by mouth once daily. for 7 days 7 tablet 0    cyanocobalamin 1,000 mcg/mL injection INJECT 1,000 MCG AS DIRECTED EVERY 14 (FOURTEEN) DAYS. 6 mL 2    diazePAM (VALIUM) 2 MG tablet Take 1 tablet (2 mg total) by mouth every 6 (six) hours as needed for Anxiety or Insomnia. 4 tablet 0    esomeprazole (NEXIUM) 40 MG capsule Take 1 capsule (40 mg total) by mouth before breakfast. 30 capsule 2    FLUoxetine 40 MG capsule Take 1 capsule (40 mg total) by mouth once daily. 30 capsule 11    gabapentin (NEURONTIN) 300 MG capsule Take 1 capsule (300 mg total) by mouth 2 (two) times daily. 180 capsule 2    hydrOXYzine HCL (ATARAX) 25 MG tablet Take 2 tablets (50 mg total) by mouth 3 (three) times daily as needed for Anxiety. 60 tablet 5    ibuprofen (ADVIL,MOTRIN) 600 MG tablet Take 1 tablet (600 mg total) by mouth every 4 (four) hours as needed for Pain (fever). 20 tablet 0    mirtazapine (REMERON) 15 MG tablet Take 1 tablet (15 mg total) by mouth nightly as needed (insomnia). 30 tablet 0    natrexone tablet 4 mg Take 1 tablet (4 mg total) by mouth every evening. 90 tablet 3    propranoloL (INDERAL) 10 MG tablet TAKE 1 TABLET BY  MOUTH 3 TIMES DAILY AS NEEDED (ANXIETY). 30 tablet 5    sumatriptan (IMITREX) 50 MG tablet Take 1 tablet (50 mg total) by mouth as needed for Migraine (Take no more than 4 tablets in 24H). 15 tablet 1     No current facility-administered medications on file prior to visit.

## 2025-07-22 ENCOUNTER — OFFICE VISIT (OUTPATIENT)
Dept: INTERNAL MEDICINE | Facility: CLINIC | Age: 40
End: 2025-07-22
Payer: COMMERCIAL

## 2025-07-22 VITALS
BODY MASS INDEX: 20.06 KG/M2 | HEIGHT: 67 IN | DIASTOLIC BLOOD PRESSURE: 60 MMHG | OXYGEN SATURATION: 99 % | HEART RATE: 77 BPM | SYSTOLIC BLOOD PRESSURE: 100 MMHG

## 2025-07-22 DIAGNOSIS — K21.9 HIATAL HERNIA WITH GERD WITHOUT ESOPHAGITIS: Primary | ICD-10-CM

## 2025-07-22 DIAGNOSIS — F51.4 NIGHT TERRORS: ICD-10-CM

## 2025-07-22 DIAGNOSIS — R55 NEUROGENIC SYNCOPE: ICD-10-CM

## 2025-07-22 DIAGNOSIS — H53.9 CHANGE IN VISION: ICD-10-CM

## 2025-07-22 DIAGNOSIS — K44.9 HIATAL HERNIA WITH GERD WITHOUT ESOPHAGITIS: Primary | ICD-10-CM

## 2025-07-22 DIAGNOSIS — G47.19 DAYTIME HYPERSOMNOLENCE: ICD-10-CM

## 2025-07-22 DIAGNOSIS — E53.8 B12 DEFICIENCY: ICD-10-CM

## 2025-07-22 DIAGNOSIS — F41.1 GAD (GENERALIZED ANXIETY DISORDER): ICD-10-CM

## 2025-07-22 PROCEDURE — 99214 OFFICE O/P EST MOD 30 MIN: CPT | Mod: S$GLB,,, | Performed by: INTERNAL MEDICINE

## 2025-07-22 PROCEDURE — 99999 PR PBB SHADOW E&M-EST. PATIENT-LVL IV: CPT | Mod: PBBFAC,,, | Performed by: INTERNAL MEDICINE

## 2025-07-22 RX ORDER — HYDROXYZINE HYDROCHLORIDE 25 MG/1
25 TABLET, FILM COATED ORAL NIGHTLY
COMMUNITY

## 2025-07-22 RX ORDER — PANTOPRAZOLE SODIUM 40 MG/1
40 TABLET, DELAYED RELEASE ORAL DAILY
COMMUNITY

## 2025-07-22 RX ORDER — ZALEPLON 10 MG/1
10 CAPSULE ORAL NIGHTLY
Qty: 30 CAPSULE | Refills: 0 | Status: SHIPPED | OUTPATIENT
Start: 2025-07-22 | End: 2025-08-21

## 2025-07-22 RX ORDER — SYRINGE WITH NEEDLE, 1 ML 27GX1/2"
1 SYRINGE, EMPTY DISPOSABLE MISCELLANEOUS WEEKLY
Qty: 10 EACH | Refills: 2 | Status: SHIPPED | OUTPATIENT
Start: 2025-07-22

## 2025-07-22 RX ORDER — FLUOXETINE 20 MG/1
20 CAPSULE ORAL DAILY
Qty: 90 CAPSULE | Refills: 1 | Status: SHIPPED | OUTPATIENT
Start: 2025-07-22 | End: 2026-07-22

## 2025-07-22 RX ORDER — CYANOCOBALAMIN 1000 UG/ML
1000 INJECTION, SOLUTION INTRAMUSCULAR; SUBCUTANEOUS
Qty: 10 ML | Refills: 2 | Status: SHIPPED | OUTPATIENT
Start: 2025-07-22

## 2025-07-25 NOTE — PROGRESS NOTES
Subjective:       Patient ID: Elizabeth Frankel is a 40 y.o. female who presents today for:    Chief Complaint:   Chief Complaint   Patient presents with    Establish Care       History of Present Illness    CHIEF COMPLAINT:  Patient presents today for follow up of fatigue and syncope.    NEUROGENIC SYNCOPE:  She has a history of syncope dating back to her twenties with multiple emergency room visits. Her most recent episode occurred last summer, resulting in a concussion, black eye from facial impact, and rib injuries affecting breathing. Prodromal symptoms include stomach discomfort. She was previously evaluated with 99% certainty for POTS and treated for dysautonomia by Dr. POND. She experiences symptoms of becoming hot, sweaty, and then cold during episodes. Her blood pressure is consistently low. Current management strategies have been effective in preventing further fainting incidents.    CHRONIC FATIGUE:  She reports significant chronic fatigue impacting daily functioning. She can sleep up to 18 hours continuously with occasional bathroom breaks, noting her energy levels are markedly reduced compared to typical [id number]. Symptoms have worsened since her mother's death during the early COVID-19 pandemic. She reports minimal improvement with current treatment but continues to experience significant exhaustion. Symptoms appear to be associated with significant emotional stress and potential chronic fatigue syndrome.    GI ISSUES:  She has chronic GI symptoms including dysphagia and hiatal hernia diagnosed around age 22. She experiences difficulty swallowing that worsens with stress, characterized by pain and discomfort. These symptoms frequently disrupt her sleep. She also reports daily morning symptoms of swollen glands and sore throat, which are more pronounced during periods of increased stress.    CURRENT MEDICATIONS:  She takes B12 injections every two weeks for deficiency diagnosed at age 26, pantoprazole  for reflux, fluoxetine 40mg daily, hydroxyzine 50mg at night for sleep, Inderal as needed for anxiety (approximately once weekly), and Imitrex for menstrual migraines.    FAMILY HISTORY:  Mother  at age 63 from rectal cancer, father is 75 years old with recent heart issues, maternal grandmother is 89 years old with lung cancer diagnosis.      ROS:  General: -fever, -chills, +fatigue, -weight gain, -weight loss, +excessive drowsiness  Eyes: -vision changes, -redness, -discharge  ENT: -ear pain, -nasal congestion, +sore throat  Cardiovascular: -chest pain, -palpitations, -lower extremity edema  Respiratory: -cough, -shortness of breath  Gastrointestinal: -abdominal pain, -nausea, -vomiting, -diarrhea, -constipation, -blood in stool, +difficulty swallowing, +heartburn  Genitourinary: -dysuria, -hematuria, -frequency  Musculoskeletal: -joint pain, -muscle pain, +body aches  Skin: -rash, -lesion  Neurological: -headache, -dizziness, -numbness, -tingling, +syncope, +fainting, +migraines  Psychiatric: +anxiety, -depression, -sleep difficulty, +nightmares           Medications:  Encounter Medications[1]    Allergies:  Review of patient's allergies indicates:   Allergen Reactions    Amoxicillin Nausea Only       Health Maintenance:  Immunization History   Administered Date(s) Administered    COVID-19, vector-nr, rS-Ad26, PF (Joel) 2021    Influenza (FLUAD) - Quadrivalent - Adjuvanted - PF *Preferred* (65+) 10/11/2019, 10/18/2021    Influenza - Quadrivalent - High Dose - PF (65 years and older) 10/22/2020    Tdap 2021, 2021      Health Maintenance   Topic Date Due    Cervical Cancer Screening  Never done    COVID-19 Vaccine (2 - - season) 2024    Influenza Vaccine (1) 2025    TETANUS VACCINE  2031    RSV Vaccine (Age 60+ and Pregnant patients) (1 - 1-dose 75+ series) 04/10/2062    Hepatitis C Screening  Completed    HIV Screening  Completed    Lipid Panel  Completed     "Pneumococcal Vaccines (Age 0-49)  Aged Out          Objective:      Vital Signs  Pulse: 77  SpO2: 99 %  BP: 100/60  Patient Position: Sitting  Pain Score: 0-No pain  Height and Weight  Height: 5' 7" (170.2 cm)  Weight in (lb) to have BMI = 25: 159.3]    Physical Exam   Physical Exam    General: No acute distress. Well-developed. Well-nourished.  Eyes: EOMI. Sclerae anicteric.  HENT: Normocephalic. Atraumatic.  Cardiovascular: Regular rate. Regular rhythm. No murmurs. No rubs. No gallops. Normal S1, S2.  Respiratory: Normal respiratory effort. Clear to auscultation bilaterally. No rales. No rhonchi. No wheezing.  Abdomen: Soft. Non-tender. Non-distended. Normoactive bowel sounds.  Musculoskeletal: No  obvious deformity.  Extremities: No lower extremity edema.  Neurological: Alert & oriented x3. No slurred speech. Normal gait.  Psychiatric: Normal mood. Normal affect. Good insight. Good judgment.  Skin: Warm. Dry. No rash.        Assessment/plan:     Elizabeth Frankel is a 40 y.o.female with:    Hiatal hernia with GERD without esophagitis   To stay on pantoprazole but need a copy of EGD in view of the young age of 40.  She thinks she had it done at [facility] but there is no record of that.  Possibly it was done at [facility] by [facility]?    B12 deficiency  -     cyanocobalamin 1,000 mcg/mL injection; Inject 1 mL (1,000 mcg total) into the muscle every 14 (fourteen) days.  Dispense: 10 mL; Refill: 2  -     syringe with needle 1 mL 25 gauge x 1" Syrg; 1 each by Misc.(Non-Drug; Combo Route) route once a week.  Dispense: 10 each; Refill: 2    JAZLYN (generalized anxiety disorder)  -     FLUoxetine 20 MG capsule; Take 1 capsule (20 mg total) by mouth once daily.  Dispense: 90 capsule; Refill: 1    Night terrors  -     Ambulatory referral/consult to Sleep Disorders; Future; Expected date: 07/29/2025  -     zaleplon (SONATA) 10 MG capsule; Take 1 capsule (10 mg total) by mouth every evening.  Dispense: 30 capsule; Refill: " 0    Daytime hypersomnolence  -     Ambulatory referral/consult to Sleep Disorders; Future; Expected date: 07/29/2025    Neurogenic syncope   Noted in the past.  Not on beta blocker or midodrine but does use conservative techniques to prevent from passing out    Change in vision  -     Ambulatory referral/consult to Optometry; Future; Expected date: 07/29/2025    Fibromyalgia?   Naltrexone seems to help symptoms with 4 mg nightly?      Assessment & Plan        NEUROGENIC SYNCOPE AND DOUBT POTS:  - Determined neurogenic syncope is more likely than POTS, based on symptoms and cardiologist assessment.  - Patient has a history of fainting spells, including 3 emergency visits in her twenties, with recent episodes last summer resulting in concussion and rib injury.  - Patient reports feeling unwell before fainting, starting with gastric discomfort, followed by hot, sweaty, and cold sensations.  - The condition occurs with little warning, possibly triggered by stress.  - Patient was initially treated for POTS with medication including beta blockers, and reports symptoms consistent with POTS such as rapid heart rate.  - Discussed potential link between COVID-19 and autonomic dysfunction.  - Continue propranolol as needed (approximately once per week).  - Advised to increase hydration and salt intake as part of management plan.    CHRONIC FATIGUE SYNDROME AND FIBROMYALGIA:  - Assessed chronic fatigue syndrome and fibromyalgia as contributing factors to excessive daytime fatigue.  - Patient reports severe exhaustion, sleeping more than elderly relatives, and feeling unable to function like a 30-year-old.  - Experiences constant pain and exhaustion which worsens with stress, particularly in trigger points consistent with fibromyalgia.  - Chronic fatigue possibly related to fibromyalgia or long-Covid, despite patient feeling happy.  - Continue naltrexone which is helping with pain management.  - Considering testosterone for  energy, pending further workup.  - Instructed patient to contact office for information on fibromyalgia to be mailed.    DYSPHAGIA AND HIATAL HERNIA:  - Patient reports difficulty swallowing, especially during stress, and waking up with swollen glands and sore throat.  - Has a hiatal hernia discovered when she was 22, with associated symptoms including difficulty swallowing and heartburn.  - Prescribed pantoprazole to manage symptoms related to dysphagia, hiatal hernia, and reflux.    GASTROESOPHAGEAL REFLUX DISEASE:  - Assessed gastroesophageal reflux and possible ulcer as contributing to symptoms.  - Patient reports experiencing heartburn and waking up with swollen glands and sore throat every morning, with symptoms worsening during stress.  - Reviewed previous endoscopy results.  - Continue pantoprazole for symptom management.    VITAMIN B12 DEFICIENCY:  - Patient has history of B12 deficiency diagnosed at age 26, with mild anemia requiring regular supplementation.  - Advised to continue B12 supplementation every 2 weeks, with consideration of subcutaneous administration.    MENSTRUAL MIGRAINE:  - Patient experiences migraines around her menstrual cycle.  - Continue Imitrex as needed for migraines.     DEPRESSIVE DISORDER:  - Patient reports experiencing a dark time, feeling very unwell, but not suicidal.  - Discontinued Remeron.  - Continue fluoxetine 40 mg, considering decreasing to 20 mg in the future due to side effects.    HYPERSOMNIA AND SLEEP DISORDERS:  - Evaluated for possible narcol  epsy/hypersomnolence.  - Patient reports excessive daytime fatigue and ability to fall asleep easily, even in public places.  - Determined sleep issues may be related to use of sedating medications.  - Assessed sleep apnea as likely potential cause of fatigue, despite normal oxygen levels reported by patient's wearable device.  - Started Sonata as trial to help with sleep and potentially reduce dreams.  - Continue  hydroxyzine 50 mg at night for sleep.  - Referred to Dr. Jose De Jesus Lilly for sleep evaluation; instructed patient to contact office regarding this appointment.    ANXIETY DISORDER:  - Patient experiences anxiety which may contribute to sleep disturbances and overall fatigue.  - Reports anxiety and stress possibly related to past events and current health issues.  - CAN Continue hydroxyzine 50 mg at night for sleep but would like to try sonata as well   - Plan to adjust fluoxetine dosage from 40 mg to 20 mg due to side effects.    FAMILY HISTORY OF CANCER:  - Patient's mother had rectal cancer and passed away at age 63.  - Maternal grandmother was diagnosed with lung cancer but is still alive.  - Advised patient to consider regular check-ups due to family history of cancer.    FAMILY HISTORY OF HEART DISEASE:  - Patient's father has been having heart issues recently and cannot fly.  - Advised patient to consider regular check-ups due to family history of heart disease.    FOLLOW-UP:  - Referred to optometry for eye exam.  - Follow up in 1 month.        Future Appointments   Date Time Provider Department Center   8/5/2025 11:30 AM Jovana Tim MD [facility] SLEE[facility]   8/22/2025  9:30 AM NURSE, [facility] INTM [place]   8/22/2025 10:00 AM Hortensia Xiong MD [facility] INT [place]   9/9/2025  8:00 AM Eric Saunders [facility] OPTOMTY [place]     This note was generated with the assistance of ambient listening technology. Verbal consent was obtained by the patient and accompanying visitor(s) for the recording of patient appointment to facilitate this note. I attest to having reviewed and edited the generated note for accuracy, though some syntax or spelling errors may persist. Please contact the author of this note for any clarification.     Hortensia Xiong MD  [facility]         [1]   Outpatient Encounter Medications as of 7/22/2025   Medication Sig Dispense Refill     "hydrOXYzine HCL (ATARAX) 25 MG tablet Take 25 mg by mouth every evening.      pantoprazole (PROTONIX) 40 MG tablet Take 40 mg by mouth once daily.      albuterol (PROVENTIL/VENTOLIN HFA) 90 mcg/actuation inhaler Inhale 2 puffs into the lungs every 6 (six) hours as needed for Wheezing or Shortness of Breath. Rescue 18 g 0    albuterol sulfate 90 mcg/actuation aebs Inhale 180 mcg into the lungs every 4 to 6 hours as needed (wheezing). 1 each 2    blood pressure test kit-large Kit Take daily values 1 each 0    cyanocobalamin 1,000 mcg/mL injection Inject 1 mL (1,000 mcg total) into the muscle every 14 (fourteen) days. 10 mL 2    FLUoxetine 20 MG capsule Take 1 capsule (20 mg total) by mouth once daily. 90 capsule 1    ibuprofen (ADVIL,MOTRIN) 600 MG tablet Take 1 tablet (600 mg total) by mouth every 4 (four) hours as needed for Pain (fever). 20 tablet 0    natrexone tablet 4 mg Take 1 tablet (4 mg total) by mouth every evening. 90 tablet 3    propranoloL (INDERAL) 10 MG tablet TAKE 1 TABLET BY MOUTH 3 TIMES DAILY AS NEEDED (ANXIETY). 30 tablet 5    sumatriptan (IMITREX) 50 MG tablet Take 1 tablet (50 mg total) by mouth as needed for Migraine (Take no more than 4 tablets in 24H). 15 tablet 1    syringe with needle 1 mL 25 gauge x 1" Syrg 1 each by Misc.(Non-Drug; Combo Route) route once a week. 10 each 2    zaleplon (SONATA) 10 MG capsule Take 1 capsule (10 mg total) by mouth every evening. 30 capsule 0    [DISCONTINUED] cetirizine (ZYRTEC) 10 MG tablet Take 1 tablet (10 mg total) by mouth once daily. for 7 days 7 tablet 0    [DISCONTINUED] cyanocobalamin 1,000 mcg/mL injection INJECT 1,000 MCG AS DIRECTED EVERY 14 (FOURTEEN) DAYS. 6 mL 2    [DISCONTINUED] diazePAM (VALIUM) 2 MG tablet Take 1 tablet (2 mg total) by mouth every 6 (six) hours as needed for Anxiety or Insomnia. 4 tablet 0    [DISCONTINUED] esomeprazole (NEXIUM) 40 MG capsule Take 1 capsule (40 mg total) by mouth before breakfast. 30 capsule 2    " [DISCONTINUED] FLUoxetine 40 MG capsule Take 1 capsule (40 mg total) by mouth once daily. 30 capsule 11    [DISCONTINUED] gabapentin (NEURONTIN) 300 MG capsule Take 1 capsule (300 mg total) by mouth 2 (two) times daily. 180 capsule 2    [DISCONTINUED] hydrOXYzine HCL (ATARAX) 25 MG tablet Take 2 tablets (50 mg total) by mouth 3 (three) times daily as needed for Anxiety. 60 tablet 5    [DISCONTINUED] mirtazapine (REMERON) 15 MG tablet Take 1 tablet (15 mg total) by mouth nightly as needed (insomnia). 30 tablet 0    [DISCONTINUED] ofloxacin (OCUFLOX) 0.3 % ophthalmic solution Place 1 drop into the right eye 4 (four) times daily. 5 mL 0     No facility-administered encounter medications on file as of 7/22/2025.

## 2025-08-11 RX ORDER — PANTOPRAZOLE SODIUM 40 MG/1
40 TABLET, DELAYED RELEASE ORAL DAILY
Qty: 90 TABLET | Refills: 1 | Status: SHIPPED | OUTPATIENT
Start: 2025-08-11

## 2025-08-22 ENCOUNTER — CLINICAL SUPPORT (OUTPATIENT)
Dept: INTERNAL MEDICINE | Facility: CLINIC | Age: 40
End: 2025-08-22
Payer: COMMERCIAL

## 2025-08-22 DIAGNOSIS — Z00.00 ANNUAL PHYSICAL EXAM: Primary | ICD-10-CM

## 2025-08-22 LAB
ABSOLUTE EOSINOPHIL (OHS): 0.04 K/UL
ABSOLUTE MONOCYTE (OHS): 0.23 K/UL (ref 0.3–1)
ABSOLUTE NEUTROPHIL COUNT (OHS): 1.95 K/UL (ref 1.8–7.7)
ALBUMIN SERPL BCP-MCNC: 4.1 G/DL (ref 3.5–5.2)
ALP SERPL-CCNC: 47 UNIT/L (ref 40–150)
ALT SERPL W/O P-5'-P-CCNC: 14 UNIT/L (ref 0–55)
ANION GAP (OHS): 9 MMOL/L (ref 8–16)
AST SERPL-CCNC: 21 UNIT/L (ref 0–50)
BASOPHILS # BLD AUTO: 0.02 K/UL
BASOPHILS NFR BLD AUTO: 0.5 %
BILIRUB SERPL-MCNC: 0.4 MG/DL (ref 0.1–1)
BUN SERPL-MCNC: 7 MG/DL (ref 6–20)
CALCIUM SERPL-MCNC: 8.9 MG/DL (ref 8.7–10.5)
CHLORIDE SERPL-SCNC: 108 MMOL/L (ref 95–110)
CHOLEST SERPL-MCNC: 192 MG/DL (ref 120–199)
CHOLEST/HDLC SERPL: 3.1 {RATIO} (ref 2–5)
CO2 SERPL-SCNC: 25 MMOL/L (ref 23–29)
CREAT SERPL-MCNC: 0.7 MG/DL (ref 0.5–1.4)
EAG (OHS): 88 MG/DL (ref 68–131)
ERYTHROCYTE [DISTWIDTH] IN BLOOD BY AUTOMATED COUNT: 12.4 % (ref 11.5–14.5)
GFR SERPLBLD CREATININE-BSD FMLA CKD-EPI: >60 ML/MIN/1.73/M2
GLUCOSE SERPL-MCNC: 86 MG/DL (ref 70–110)
HBA1C MFR BLD: 4.7 % (ref 4–5.6)
HCT VFR BLD AUTO: 33.4 % (ref 37–48.5)
HDLC SERPL-MCNC: 62 MG/DL (ref 40–75)
HDLC SERPL: 32.3 % (ref 20–50)
HGB BLD-MCNC: 11.1 GM/DL (ref 12–16)
IMM GRANULOCYTES # BLD AUTO: 0 K/UL (ref 0–0.04)
IMM GRANULOCYTES NFR BLD AUTO: 0 % (ref 0–0.5)
LDLC SERPL CALC-MCNC: 114.6 MG/DL (ref 63–159)
LYMPHOCYTES # BLD AUTO: 1.5 K/UL (ref 1–4.8)
MCH RBC QN AUTO: 31 PG (ref 27–31)
MCHC RBC AUTO-ENTMCNC: 33.2 G/DL (ref 32–36)
MCV RBC AUTO: 93 FL (ref 82–98)
NONHDLC SERPL-MCNC: 130 MG/DL
NUCLEATED RBC (/100WBC) (OHS): 0 /100 WBC
PLATELET # BLD AUTO: 223 K/UL (ref 150–450)
PMV BLD AUTO: 10.6 FL (ref 9.2–12.9)
POTASSIUM SERPL-SCNC: 4.2 MMOL/L (ref 3.5–5.1)
PROT SERPL-MCNC: 6.3 GM/DL (ref 6–8.4)
RBC # BLD AUTO: 3.58 M/UL (ref 4–5.4)
RELATIVE EOSINOPHIL (OHS): 1.1 %
RELATIVE LYMPHOCYTE (OHS): 40.1 % (ref 18–48)
RELATIVE MONOCYTE (OHS): 6.1 % (ref 4–15)
RELATIVE NEUTROPHIL (OHS): 52.2 % (ref 38–73)
SODIUM SERPL-SCNC: 142 MMOL/L (ref 136–145)
TRIGL SERPL-MCNC: 77 MG/DL (ref 30–150)
TSH SERPL-ACNC: 2.07 UIU/ML (ref 0.4–4)
WBC # BLD AUTO: 3.74 K/UL (ref 3.9–12.7)

## 2025-08-22 PROCEDURE — 82040 ASSAY OF SERUM ALBUMIN: CPT

## 2025-08-22 PROCEDURE — 85025 COMPLETE CBC W/AUTO DIFF WBC: CPT

## 2025-08-22 PROCEDURE — 83036 HEMOGLOBIN GLYCOSYLATED A1C: CPT

## 2025-08-22 PROCEDURE — 99999 PR PBB SHADOW E&M-EST. PATIENT-LVL I: CPT | Mod: PBBFAC,,,

## 2025-08-22 PROCEDURE — 80061 LIPID PANEL: CPT

## 2025-08-22 PROCEDURE — 84443 ASSAY THYROID STIM HORMONE: CPT

## 2025-08-26 ENCOUNTER — OFFICE VISIT (OUTPATIENT)
Dept: INTERNAL MEDICINE | Facility: CLINIC | Age: 40
End: 2025-08-26
Payer: COMMERCIAL

## 2025-08-26 ENCOUNTER — PATIENT MESSAGE (OUTPATIENT)
Dept: INTERNAL MEDICINE | Facility: CLINIC | Age: 40
End: 2025-08-26

## 2025-08-26 DIAGNOSIS — H57.13 EYE PAIN, BILATERAL: ICD-10-CM

## 2025-08-26 DIAGNOSIS — M12.89 TRANSIENT ARTHROPATHY OF MULTIPLE SITES: ICD-10-CM

## 2025-08-26 DIAGNOSIS — M79.18 MYALGIA OF MUSCLE OF NECK: ICD-10-CM

## 2025-08-26 DIAGNOSIS — R68.84 JAW PAIN, NON-TMJ: ICD-10-CM

## 2025-08-26 DIAGNOSIS — R29.898 WEAKNESS OF LEFT SHOULDER: ICD-10-CM

## 2025-08-26 DIAGNOSIS — M47.22 OSTEOARTHRITIS OF SPINE WITH RADICULOPATHY, CERVICAL REGION: ICD-10-CM

## 2025-08-26 DIAGNOSIS — H35.719 CENTRAL SEROUS CHORIORETINOPATHY, UNSPECIFIED LATERALITY: ICD-10-CM

## 2025-08-26 DIAGNOSIS — G43.111 INTRACTABLE MIGRAINE WITH AURA WITH STATUS MIGRAINOSUS: Primary | ICD-10-CM

## 2025-08-26 DIAGNOSIS — M25.642 MORNING JOINT STIFFNESS OF LEFT HAND: ICD-10-CM

## 2025-08-26 RX ORDER — RIMEGEPANT SULFATE 75 MG/75MG
75 TABLET, ORALLY DISINTEGRATING ORAL ONCE AS NEEDED
Qty: 9 TABLET | Refills: 2 | Status: SHIPPED | OUTPATIENT
Start: 2025-08-26 | End: 2025-08-26

## 2025-08-26 RX ORDER — RIZATRIPTAN BENZOATE 10 MG/1
10 TABLET, ORALLY DISINTEGRATING ORAL
Qty: 9 TABLET | Refills: 2 | Status: SHIPPED | OUTPATIENT
Start: 2025-08-26 | End: 2025-09-25

## 2025-08-26 RX ORDER — ORPHENADRINE CITRATE 100 MG/1
100 TABLET, EXTENDED RELEASE ORAL 2 TIMES DAILY
Qty: 20 TABLET | Refills: 0 | Status: SHIPPED | OUTPATIENT
Start: 2025-08-26 | End: 2025-09-05

## 2025-08-26 RX ORDER — PREDNISONE 20 MG/1
40 TABLET ORAL DAILY
Qty: 6 TABLET | Refills: 0 | Status: SHIPPED | OUTPATIENT
Start: 2025-08-26 | End: 2025-08-29

## 2025-08-26 RX ORDER — NAPROXEN 500 MG/1
500 TABLET ORAL 2 TIMES DAILY PRN
Qty: 60 TABLET | Refills: 0 | Status: SHIPPED | OUTPATIENT
Start: 2025-08-26

## 2025-08-27 ENCOUNTER — PATIENT MESSAGE (OUTPATIENT)
Dept: INTERNAL MEDICINE | Facility: CLINIC | Age: 40
End: 2025-08-27
Payer: COMMERCIAL

## 2025-08-29 ENCOUNTER — PATIENT MESSAGE (OUTPATIENT)
Dept: NEUROLOGY | Facility: CLINIC | Age: 40
End: 2025-08-29
Payer: COMMERCIAL

## 2025-08-29 ENCOUNTER — TELEPHONE (OUTPATIENT)
Dept: NEUROLOGY | Facility: CLINIC | Age: 40
End: 2025-08-29
Payer: COMMERCIAL

## 2025-09-05 ENCOUNTER — TELEPHONE (OUTPATIENT)
Dept: OPTOMETRY | Facility: CLINIC | Age: 40
End: 2025-09-05
Payer: COMMERCIAL